# Patient Record
Sex: MALE | Race: WHITE | URBAN - METROPOLITAN AREA
[De-identification: names, ages, dates, MRNs, and addresses within clinical notes are randomized per-mention and may not be internally consistent; named-entity substitution may affect disease eponyms.]

---

## 2017-01-01 ENCOUNTER — APPOINTMENT (OUTPATIENT)
Dept: MRI IMAGING | Facility: CLINIC | Age: 72
DRG: 094 | End: 2017-01-01
Attending: EMERGENCY MEDICINE
Payer: COMMERCIAL

## 2017-01-01 ENCOUNTER — APPOINTMENT (OUTPATIENT)
Dept: GENERAL RADIOLOGY | Facility: CLINIC | Age: 72
DRG: 094 | End: 2017-01-01
Attending: HOSPITALIST
Payer: COMMERCIAL

## 2017-01-01 ENCOUNTER — APPOINTMENT (OUTPATIENT)
Dept: OCCUPATIONAL THERAPY | Facility: CLINIC | Age: 72
DRG: 094 | End: 2017-01-01
Attending: HOSPITALIST
Payer: COMMERCIAL

## 2017-01-01 ENCOUNTER — APPOINTMENT (OUTPATIENT)
Dept: MRI IMAGING | Facility: CLINIC | Age: 72
DRG: 094 | End: 2017-01-01
Attending: NEUROLOGICAL SURGERY
Payer: COMMERCIAL

## 2017-01-01 ENCOUNTER — TRANSFERRED RECORDS (OUTPATIENT)
Dept: HEALTH INFORMATION MANAGEMENT | Facility: CLINIC | Age: 72
End: 2017-01-01

## 2017-01-01 ENCOUNTER — APPOINTMENT (OUTPATIENT)
Dept: INTERVENTIONAL RADIOLOGY/VASCULAR | Facility: CLINIC | Age: 72
DRG: 094 | End: 2017-01-01
Attending: INTERNAL MEDICINE
Payer: COMMERCIAL

## 2017-01-01 ENCOUNTER — APPOINTMENT (OUTPATIENT)
Dept: CT IMAGING | Facility: CLINIC | Age: 72
DRG: 094 | End: 2017-01-01
Attending: PSYCHIATRY & NEUROLOGY
Payer: COMMERCIAL

## 2017-01-01 ENCOUNTER — APPOINTMENT (OUTPATIENT)
Dept: CARDIOLOGY | Facility: CLINIC | Age: 72
DRG: 094 | End: 2017-01-01
Attending: HOSPITALIST
Payer: COMMERCIAL

## 2017-01-01 ENCOUNTER — HOSPITAL ENCOUNTER (INPATIENT)
Facility: CLINIC | Age: 72
LOS: 4 days | Discharge: ANOTHER HOSPITAL | DRG: 094 | End: 2017-01-21
Attending: EMERGENCY MEDICINE | Admitting: HOSPITALIST
Payer: COMMERCIAL

## 2017-01-01 ENCOUNTER — APPOINTMENT (OUTPATIENT)
Dept: SPEECH THERAPY | Facility: CLINIC | Age: 72
DRG: 094 | End: 2017-01-01
Attending: HOSPITALIST
Payer: COMMERCIAL

## 2017-01-01 VITALS
RESPIRATION RATE: 16 BRPM | OXYGEN SATURATION: 96 % | BODY MASS INDEX: 24.3 KG/M2 | WEIGHT: 169.75 LBS | HEART RATE: 127 BPM | SYSTOLIC BLOOD PRESSURE: 122 MMHG | DIASTOLIC BLOOD PRESSURE: 82 MMHG | TEMPERATURE: 98.1 F | HEIGHT: 70 IN

## 2017-01-01 DIAGNOSIS — R29.898 BILATERAL ARM WEAKNESS: ICD-10-CM

## 2017-01-01 DIAGNOSIS — C79.51 BONE METASTASIS: ICD-10-CM

## 2017-01-01 DIAGNOSIS — R29.898 LEG WEAKNESS, BILATERAL: Primary | ICD-10-CM

## 2017-01-01 DIAGNOSIS — R29.898 BILATERAL LEG WEAKNESS: ICD-10-CM

## 2017-01-01 LAB
ALBUMIN SERPL-MCNC: 2.9 G/DL (ref 3.4–5)
ALBUMIN SERPL-MCNC: 3.1 G/DL (ref 3.4–5)
ALBUMIN SERPL-MCNC: 3.2 G/DL (ref 3.4–5)
ALP SERPL-CCNC: 237 U/L (ref 40–150)
ALP SERPL-CCNC: 242 U/L (ref 40–150)
ALP SERPL-CCNC: 243 U/L (ref 40–150)
ALT SERPL W P-5'-P-CCNC: 28 U/L (ref 0–70)
ALT SERPL W P-5'-P-CCNC: 33 U/L (ref 0–70)
ALT SERPL W P-5'-P-CCNC: 45 U/L (ref 0–70)
ANION GAP SERPL CALCULATED.3IONS-SCNC: 10 MMOL/L (ref 3–14)
ANION GAP SERPL CALCULATED.3IONS-SCNC: 5 MMOL/L (ref 3–14)
ANION GAP SERPL CALCULATED.3IONS-SCNC: 8 MMOL/L (ref 3–14)
ANION GAP SERPL CALCULATED.3IONS-SCNC: 9 MMOL/L (ref 3–14)
APPEARANCE CSF: CLEAR
AST SERPL W P-5'-P-CCNC: 51 U/L (ref 0–45)
AST SERPL W P-5'-P-CCNC: 79 U/L (ref 0–45)
AST SERPL W P-5'-P-CCNC: 84 U/L (ref 0–45)
BILIRUB DIRECT SERPL-MCNC: 0.3 MG/DL (ref 0–0.2)
BILIRUB SERPL-MCNC: 0.5 MG/DL (ref 0.2–1.3)
BILIRUB SERPL-MCNC: 0.8 MG/DL (ref 0.2–1.3)
BILIRUB SERPL-MCNC: 0.8 MG/DL (ref 0.2–1.3)
BUN SERPL-MCNC: 16 MG/DL (ref 7–30)
BUN SERPL-MCNC: 21 MG/DL (ref 7–30)
BUN SERPL-MCNC: 23 MG/DL (ref 7–30)
BUN SERPL-MCNC: 25 MG/DL (ref 7–30)
CALCIUM SERPL-MCNC: 8.2 MG/DL (ref 8.5–10.1)
CALCIUM SERPL-MCNC: 8.6 MG/DL (ref 8.5–10.1)
CALCIUM SERPL-MCNC: 9.1 MG/DL (ref 8.5–10.1)
CALCIUM SERPL-MCNC: 9.5 MG/DL (ref 8.5–10.1)
CHLORIDE SERPL-SCNC: 102 MMOL/L (ref 94–109)
CHLORIDE SERPL-SCNC: 103 MMOL/L (ref 94–109)
CHLORIDE SERPL-SCNC: 105 MMOL/L (ref 94–109)
CHLORIDE SERPL-SCNC: 109 MMOL/L (ref 94–109)
CHOLEST SERPL-MCNC: 197 MG/DL
CO2 SERPL-SCNC: 20 MMOL/L (ref 20–32)
CO2 SERPL-SCNC: 25 MMOL/L (ref 20–32)
CO2 SERPL-SCNC: 25 MMOL/L (ref 20–32)
CO2 SERPL-SCNC: 30 MMOL/L (ref 20–32)
COLOR CSF: COLORLESS
COPATH REPORT: NORMAL
CREAT SERPL-MCNC: 1.13 MG/DL (ref 0.66–1.25)
CREAT SERPL-MCNC: 1.15 MG/DL (ref 0.66–1.25)
CREAT SERPL-MCNC: 1.16 MG/DL (ref 0.66–1.25)
CREAT SERPL-MCNC: 1.17 MG/DL (ref 0.66–1.25)
DEPRECATED CALCIDIOL+CALCIFEROL SERPL-MC: 82 UG/L (ref 20–75)
ERYTHROCYTE [DISTWIDTH] IN BLOOD BY AUTOMATED COUNT: 16.5 % (ref 10–15)
ERYTHROCYTE [DISTWIDTH] IN BLOOD BY AUTOMATED COUNT: 16.7 % (ref 10–15)
ERYTHROCYTE [DISTWIDTH] IN BLOOD BY AUTOMATED COUNT: 16.8 % (ref 10–15)
ERYTHROCYTE [DISTWIDTH] IN BLOOD BY AUTOMATED COUNT: 17.1 % (ref 10–15)
FOLATE SERPL-MCNC: 17.7 NG/ML
GFR SERPL CREATININE-BSD FRML MDRD: 62 ML/MIN/1.7M2
GFR SERPL CREATININE-BSD FRML MDRD: 62 ML/MIN/1.7M2
GFR SERPL CREATININE-BSD FRML MDRD: 63 ML/MIN/1.7M2
GFR SERPL CREATININE-BSD FRML MDRD: 64 ML/MIN/1.7M2
GLUCOSE CSF-MCNC: 69 MG/DL (ref 40–70)
GLUCOSE SERPL-MCNC: 104 MG/DL (ref 70–99)
GLUCOSE SERPL-MCNC: 112 MG/DL (ref 70–99)
GLUCOSE SERPL-MCNC: 185 MG/DL (ref 70–99)
GLUCOSE SERPL-MCNC: 99 MG/DL (ref 70–99)
HBA1C MFR BLD: 4.8 % (ref 4.3–6)
HCT VFR BLD AUTO: 30 % (ref 40–53)
HCT VFR BLD AUTO: 30.3 % (ref 40–53)
HCT VFR BLD AUTO: 31.6 % (ref 40–53)
HCT VFR BLD AUTO: 32.9 % (ref 40–53)
HDLC SERPL-MCNC: 54 MG/DL
HGB BLD-MCNC: 10 G/DL (ref 13.3–17.7)
HGB BLD-MCNC: 10 G/DL (ref 13.3–17.7)
HGB BLD-MCNC: 10.7 G/DL (ref 13.3–17.7)
HGB BLD-MCNC: 11 G/DL (ref 13.3–17.7)
INR PPP: 1.11 (ref 0.86–1.14)
INR PPP: 1.15 (ref 0.86–1.14)
INTERPRETATION ECG - MUSE: NORMAL
INTERPRETATION ECG - MUSE: NORMAL
LDLC SERPL CALC-MCNC: 117 MG/DL
MAGNESIUM SERPL-MCNC: 1.1 MG/DL (ref 1.6–2.3)
MAGNESIUM SERPL-MCNC: 1.4 MG/DL (ref 1.6–2.3)
MAGNESIUM SERPL-MCNC: 1.8 MG/DL (ref 1.6–2.3)
MAGNESIUM SERPL-MCNC: 2.2 MG/DL (ref 1.6–2.3)
MAGNESIUM SERPL-MCNC: 2.5 MG/DL (ref 1.6–2.3)
MCH RBC QN AUTO: 34.4 PG (ref 26.5–33)
MCH RBC QN AUTO: 34.8 PG (ref 26.5–33)
MCH RBC QN AUTO: 34.8 PG (ref 26.5–33)
MCH RBC QN AUTO: 35 PG (ref 26.5–33)
MCHC RBC AUTO-ENTMCNC: 33 G/DL (ref 31.5–36.5)
MCHC RBC AUTO-ENTMCNC: 33.3 G/DL (ref 31.5–36.5)
MCHC RBC AUTO-ENTMCNC: 33.4 G/DL (ref 31.5–36.5)
MCHC RBC AUTO-ENTMCNC: 33.9 G/DL (ref 31.5–36.5)
MCV RBC AUTO: 103 FL (ref 78–100)
MCV RBC AUTO: 104 FL (ref 78–100)
MCV RBC AUTO: 104 FL (ref 78–100)
MCV RBC AUTO: 105 FL (ref 78–100)
NONHDLC SERPL-MCNC: 143 MG/DL
PHOSPHATE SERPL-MCNC: 2.8 MG/DL (ref 2.5–4.5)
PLATELET # BLD AUTO: 112 10E9/L (ref 150–450)
PLATELET # BLD AUTO: 135 10E9/L (ref 150–450)
PLATELET # BLD AUTO: 144 10E9/L (ref 150–450)
PLATELET # BLD AUTO: 155 10E9/L (ref 150–450)
PNP ABY SERUM: NORMAL
POTASSIUM SERPL-SCNC: 3.3 MMOL/L (ref 3.4–5.3)
POTASSIUM SERPL-SCNC: 3.9 MMOL/L (ref 3.4–5.3)
POTASSIUM SERPL-SCNC: 4.6 MMOL/L (ref 3.4–5.3)
POTASSIUM SERPL-SCNC: 4.6 MMOL/L (ref 3.4–5.3)
PROT CSF-MCNC: 85 MG/DL (ref 15–60)
PROT SERPL-MCNC: 6.7 G/DL (ref 6.8–8.8)
PROT SERPL-MCNC: 7.2 G/DL (ref 6.8–8.8)
PROT SERPL-MCNC: 7.3 G/DL (ref 6.8–8.8)
RBC # BLD AUTO: 2.87 10E12/L (ref 4.4–5.9)
RBC # BLD AUTO: 2.91 10E12/L (ref 4.4–5.9)
RBC # BLD AUTO: 3.06 10E12/L (ref 4.4–5.9)
RBC # BLD AUTO: 3.16 10E12/L (ref 4.4–5.9)
RBC # CSF MANUAL: 21 /UL (ref 0–2)
SODIUM SERPL-SCNC: 137 MMOL/L (ref 133–144)
SODIUM SERPL-SCNC: 137 MMOL/L (ref 133–144)
SODIUM SERPL-SCNC: 138 MMOL/L (ref 133–144)
SODIUM SERPL-SCNC: 139 MMOL/L (ref 133–144)
TRIGL SERPL-MCNC: 131 MG/DL
TROPONIN I SERPL-MCNC: 0.07 UG/L (ref 0–0.04)
TSH SERPL DL<=0.005 MIU/L-ACNC: 0.81 MU/L (ref 0.4–4)
TUBE # CSF: 4 #
VIT B12 SERPL-MCNC: >6000 PG/ML (ref 193–986)
WBC # BLD AUTO: 3.7 10E9/L (ref 4–11)
WBC # BLD AUTO: 4.3 10E9/L (ref 4–11)
WBC # BLD AUTO: 5.6 10E9/L (ref 4–11)
WBC # BLD AUTO: 5.9 10E9/L (ref 4–11)
WBC # CSF MANUAL: 0 /UL (ref 0–5)

## 2017-01-01 PROCEDURE — 25000132 ZZH RX MED GY IP 250 OP 250 PS 637: Performed by: HOSPITALIST

## 2017-01-01 PROCEDURE — 40000885 ZZH STATISTIC STEP DOWN HRS EVENING

## 2017-01-01 PROCEDURE — 99239 HOSP IP/OBS DSCHRG MGMT >30: CPT | Performed by: INTERNAL MEDICINE

## 2017-01-01 PROCEDURE — 80061 LIPID PANEL: CPT | Performed by: HOSPITALIST

## 2017-01-01 PROCEDURE — 40000275 ZZH STATISTIC RCP TIME EA 10 MIN

## 2017-01-01 PROCEDURE — 99223 1ST HOSP IP/OBS HIGH 75: CPT | Mod: AI | Performed by: HOSPITALIST

## 2017-01-01 PROCEDURE — 25000128 H RX IP 250 OP 636: Performed by: INTERNAL MEDICINE

## 2017-01-01 PROCEDURE — 84157 ASSAY OF PROTEIN OTHER: CPT | Performed by: PSYCHIATRY & NEUROLOGY

## 2017-01-01 PROCEDURE — 00000102 ZZHCL STATISTIC CYTO WRIGHT STAIN TC: Performed by: PSYCHIATRY & NEUROLOGY

## 2017-01-01 PROCEDURE — 80053 COMPREHEN METABOLIC PANEL: CPT | Performed by: HOSPITALIST

## 2017-01-01 PROCEDURE — 36415 COLL VENOUS BLD VENIPUNCTURE: CPT | Performed by: INTERNAL MEDICINE

## 2017-01-01 PROCEDURE — 96365 THER/PROPH/DIAG IV INF INIT: CPT

## 2017-01-01 PROCEDURE — 27210886 ZZH ACCESSORY CR5

## 2017-01-01 PROCEDURE — 99233 SBSQ HOSP IP/OBS HIGH 50: CPT | Performed by: HOSPITALIST

## 2017-01-01 PROCEDURE — A9585 GADOBUTROL INJECTION: HCPCS | Performed by: HOSPITALIST

## 2017-01-01 PROCEDURE — 12000000 ZZH R&B MED SURG/OB

## 2017-01-01 PROCEDURE — 40000884 ZZH STATISTIC STEP DOWN HRS NIGHT

## 2017-01-01 PROCEDURE — 94150 VITAL CAPACITY TEST: CPT

## 2017-01-01 PROCEDURE — 85610 PROTHROMBIN TIME: CPT | Performed by: EMERGENCY MEDICINE

## 2017-01-01 PROCEDURE — C1769 GUIDE WIRE: HCPCS

## 2017-01-01 PROCEDURE — 83735 ASSAY OF MAGNESIUM: CPT | Performed by: INTERNAL MEDICINE

## 2017-01-01 PROCEDURE — 99232 SBSQ HOSP IP/OBS MODERATE 35: CPT | Performed by: HOSPITALIST

## 2017-01-01 PROCEDURE — 82607 VITAMIN B-12: CPT | Performed by: PSYCHIATRY & NEUROLOGY

## 2017-01-01 PROCEDURE — 25500064 ZZH RX 255 OP 636: Performed by: HOSPITALIST

## 2017-01-01 PROCEDURE — 25000125 ZZHC RX 250: Performed by: INTERNAL MEDICINE

## 2017-01-01 PROCEDURE — 92610 EVALUATE SWALLOWING FUNCTION: CPT | Mod: GN | Performed by: SPEECH-LANGUAGE PATHOLOGIST

## 2017-01-01 PROCEDURE — 80048 BASIC METABOLIC PNL TOTAL CA: CPT | Performed by: HOSPITALIST

## 2017-01-01 PROCEDURE — 25000132 ZZH RX MED GY IP 250 OP 250 PS 637: Performed by: INTERNAL MEDICINE

## 2017-01-01 PROCEDURE — 85027 COMPLETE CBC AUTOMATED: CPT | Performed by: HOSPITALIST

## 2017-01-01 PROCEDURE — 85027 COMPLETE CBC AUTOMATED: CPT | Performed by: EMERGENCY MEDICINE

## 2017-01-01 PROCEDURE — 84443 ASSAY THYROID STIM HORMONE: CPT | Performed by: HOSPITALIST

## 2017-01-01 PROCEDURE — 77003 FLUOROGUIDE FOR SPINE INJECT: CPT

## 2017-01-01 PROCEDURE — 89050 BODY FLUID CELL COUNT: CPT | Performed by: PSYCHIATRY & NEUROLOGY

## 2017-01-01 PROCEDURE — 36558 INSERT TUNNELED CV CATH: CPT | Mod: LT

## 2017-01-01 PROCEDURE — 40000264 ECHO BUBBLE STUDY W/LUMASON

## 2017-01-01 PROCEDURE — 25000125 ZZHC RX 250: Performed by: EMERGENCY MEDICINE

## 2017-01-01 PROCEDURE — 83520 IMMUNOASSAY QUANT NOS NONAB: CPT | Performed by: PSYCHIATRY & NEUROLOGY

## 2017-01-01 PROCEDURE — 95885 MUSC TST DONE W/NERV TST LIM: CPT

## 2017-01-01 PROCEDURE — 25000125 ZZHC RX 250: Performed by: NEUROLOGICAL SURGERY

## 2017-01-01 PROCEDURE — 36415 COLL VENOUS BLD VENIPUNCTURE: CPT | Performed by: PSYCHIATRY & NEUROLOGY

## 2017-01-01 PROCEDURE — 25500064 ZZH RX 255 OP 636: Performed by: PSYCHIATRY & NEUROLOGY

## 2017-01-01 PROCEDURE — 88108 CYTOPATH CONCENTRATE TECH: CPT | Performed by: PSYCHIATRY & NEUROLOGY

## 2017-01-01 PROCEDURE — 25000125 ZZHC RX 250: Performed by: HOSPITALIST

## 2017-01-01 PROCEDURE — 82746 ASSAY OF FOLIC ACID SERUM: CPT | Performed by: PSYCHIATRY & NEUROLOGY

## 2017-01-01 PROCEDURE — 97530 THERAPEUTIC ACTIVITIES: CPT | Mod: GO

## 2017-01-01 PROCEDURE — 82306 VITAMIN D 25 HYDROXY: CPT | Performed by: HOSPITALIST

## 2017-01-01 PROCEDURE — 83519 RIA NONANTIBODY: CPT | Performed by: PSYCHIATRY & NEUROLOGY

## 2017-01-01 PROCEDURE — 72141 MRI NECK SPINE W/O DYE: CPT

## 2017-01-01 PROCEDURE — 99285 EMERGENCY DEPT VISIT HI MDM: CPT | Mod: 25

## 2017-01-01 PROCEDURE — C1750 CATH, HEMODIALYSIS,LONG-TERM: HCPCS

## 2017-01-01 PROCEDURE — 70551 MRI BRAIN STEM W/O DYE: CPT

## 2017-01-01 PROCEDURE — 82945 GLUCOSE OTHER FLUID: CPT | Performed by: PSYCHIATRY & NEUROLOGY

## 2017-01-01 PROCEDURE — 83036 HEMOGLOBIN GLYCOSYLATED A1C: CPT | Performed by: HOSPITALIST

## 2017-01-01 PROCEDURE — 83735 ASSAY OF MAGNESIUM: CPT | Performed by: HOSPITALIST

## 2017-01-01 PROCEDURE — 36415 COLL VENOUS BLD VENIPUNCTURE: CPT | Performed by: HOSPITALIST

## 2017-01-01 PROCEDURE — 40000133 ZZH STATISTIC OT WARD VISIT

## 2017-01-01 PROCEDURE — 97167 OT EVAL HIGH COMPLEX 60 MIN: CPT | Mod: GO

## 2017-01-01 PROCEDURE — 27210905 ZZH KIT CR7

## 2017-01-01 PROCEDURE — 93005 ELECTROCARDIOGRAM TRACING: CPT

## 2017-01-01 PROCEDURE — 25500045 ZZH RX 255: Performed by: HOSPITALIST

## 2017-01-01 PROCEDURE — 25000125 ZZHC RX 250: Performed by: PSYCHIATRY & NEUROLOGY

## 2017-01-01 PROCEDURE — 86255 FLUORESCENT ANTIBODY SCREEN: CPT | Performed by: PSYCHIATRY & NEUROLOGY

## 2017-01-01 PROCEDURE — 27211193 ZZ H WOUND GLUE CR1

## 2017-01-01 PROCEDURE — 99232 SBSQ HOSP IP/OBS MODERATE 35: CPT | Performed by: INTERNAL MEDICINE

## 2017-01-01 PROCEDURE — P9041 ALBUMIN (HUMAN),5%, 50ML: HCPCS | Performed by: INTERNAL MEDICINE

## 2017-01-01 PROCEDURE — A9270 NON-COVERED ITEM OR SERVICE: HCPCS | Mod: GY | Performed by: EMERGENCY MEDICINE

## 2017-01-01 PROCEDURE — 40000225 ZZH STATISTIC SLP WARD VISIT: Performed by: SPEECH-LANGUAGE PATHOLOGIST

## 2017-01-01 PROCEDURE — 95886 MUSC TEST DONE W/N TEST COMP: CPT

## 2017-01-01 PROCEDURE — 99232 SBSQ HOSP IP/OBS MODERATE 35: CPT | Performed by: NURSE PRACTITIONER

## 2017-01-01 PROCEDURE — 72142 MRI NECK SPINE W/DYE: CPT

## 2017-01-01 PROCEDURE — 12000007 ZZH R&B INTERMEDIATE

## 2017-01-01 PROCEDURE — 80048 BASIC METABOLIC PNL TOTAL CA: CPT | Performed by: EMERGENCY MEDICINE

## 2017-01-01 PROCEDURE — 95910 NRV CNDJ TEST 7-8 STUDIES: CPT

## 2017-01-01 PROCEDURE — 84100 ASSAY OF PHOSPHORUS: CPT | Performed by: HOSPITALIST

## 2017-01-01 PROCEDURE — 25000125 ZZHC RX 250

## 2017-01-01 PROCEDURE — 72146 MRI CHEST SPINE W/O DYE: CPT

## 2017-01-01 PROCEDURE — 82746 ASSAY OF FOLIC ACID SERUM: CPT | Performed by: HOSPITALIST

## 2017-01-01 PROCEDURE — 36514 APHERESIS PLASMA: CPT

## 2017-01-01 PROCEDURE — 25000132 ZZH RX MED GY IP 250 OP 250 PS 637: Mod: GY | Performed by: EMERGENCY MEDICINE

## 2017-01-01 PROCEDURE — 85610 PROTHROMBIN TIME: CPT | Performed by: HOSPITALIST

## 2017-01-01 PROCEDURE — 80076 HEPATIC FUNCTION PANEL: CPT | Performed by: EMERGENCY MEDICINE

## 2017-01-01 PROCEDURE — 93306 TTE W/DOPPLER COMPLETE: CPT | Mod: 26 | Performed by: INTERNAL MEDICINE

## 2017-01-01 PROCEDURE — 88108 CYTOPATH CONCENTRATE TECH: CPT | Mod: 26 | Performed by: PSYCHIATRY & NEUROLOGY

## 2017-01-01 PROCEDURE — 93010 ELECTROCARDIOGRAM REPORT: CPT | Performed by: INTERNAL MEDICINE

## 2017-01-01 PROCEDURE — 25000128 H RX IP 250 OP 636: Performed by: HOSPITALIST

## 2017-01-01 PROCEDURE — 83735 ASSAY OF MAGNESIUM: CPT | Performed by: EMERGENCY MEDICINE

## 2017-01-01 PROCEDURE — 99221 1ST HOSP IP/OBS SF/LOW 40: CPT | Performed by: NEUROLOGICAL SURGERY

## 2017-01-01 PROCEDURE — 72148 MRI LUMBAR SPINE W/O DYE: CPT

## 2017-01-01 PROCEDURE — 96375 TX/PRO/DX INJ NEW DRUG ADDON: CPT

## 2017-01-01 PROCEDURE — 25000125 ZZHC RX 250: Performed by: RADIOLOGY

## 2017-01-01 PROCEDURE — 70498 CT ANGIOGRAPHY NECK: CPT

## 2017-01-01 PROCEDURE — 84484 ASSAY OF TROPONIN QUANT: CPT | Performed by: HOSPITALIST

## 2017-01-01 RX ORDER — HEPARIN SODIUM 1000 [USP'U]/ML
2-6 INJECTION, SOLUTION INTRAVENOUS; SUBCUTANEOUS ONCE
Status: COMPLETED | OUTPATIENT
Start: 2017-01-01 | End: 2017-01-01

## 2017-01-01 RX ORDER — HEPARIN SODIUM 1000 [USP'U]/ML
1-6 INJECTION, SOLUTION INTRAVENOUS; SUBCUTANEOUS
Status: COMPLETED | OUTPATIENT
Start: 2017-01-01 | End: 2017-01-01

## 2017-01-01 RX ORDER — HYDROMORPHONE HYDROCHLORIDE 1 MG/ML
.3-.5 INJECTION, SOLUTION INTRAMUSCULAR; INTRAVENOUS; SUBCUTANEOUS
Status: DISCONTINUED | OUTPATIENT
Start: 2017-01-01 | End: 2017-01-01 | Stop reason: HOSPADM

## 2017-01-01 RX ORDER — MAGNESIUM SULFATE HEPTAHYDRATE 40 MG/ML
4 INJECTION, SOLUTION INTRAVENOUS EVERY 4 HOURS PRN
Status: DISCONTINUED | OUTPATIENT
Start: 2017-01-01 | End: 2017-01-01 | Stop reason: HOSPADM

## 2017-01-01 RX ORDER — LIDOCAINE 40 MG/G
CREAM TOPICAL
Status: DISCONTINUED | OUTPATIENT
Start: 2017-01-01 | End: 2017-01-01 | Stop reason: HOSPADM

## 2017-01-01 RX ORDER — ALBUMIN HUMAN 25 %
5000 INTRAVENOUS SOLUTION INTRAVENOUS ONCE
Status: COMPLETED | OUTPATIENT
Start: 2017-01-01 | End: 2017-01-01

## 2017-01-01 RX ORDER — DEXAMETHASONE SODIUM PHOSPHATE 4 MG/ML
6 INJECTION, SOLUTION INTRA-ARTICULAR; INTRALESIONAL; INTRAMUSCULAR; INTRAVENOUS; SOFT TISSUE EVERY 6 HOURS
Status: DISCONTINUED | OUTPATIENT
Start: 2017-01-01 | End: 2017-01-01

## 2017-01-01 RX ORDER — ONDANSETRON 2 MG/ML
4 INJECTION INTRAMUSCULAR; INTRAVENOUS ONCE
Status: COMPLETED | OUTPATIENT
Start: 2017-01-01 | End: 2017-01-01

## 2017-01-01 RX ORDER — LABETALOL HYDROCHLORIDE 5 MG/ML
10-40 INJECTION, SOLUTION INTRAVENOUS EVERY 10 MIN PRN
Status: DISCONTINUED | OUTPATIENT
Start: 2017-01-01 | End: 2017-01-01 | Stop reason: HOSPADM

## 2017-01-01 RX ORDER — SENNOSIDES 8.6 MG
1 TABLET ORAL 2 TIMES DAILY
COMMUNITY

## 2017-01-01 RX ORDER — ONDANSETRON 2 MG/ML
4 INJECTION INTRAMUSCULAR; INTRAVENOUS ONCE
Status: DISCONTINUED | OUTPATIENT
Start: 2017-01-01 | End: 2017-01-01 | Stop reason: HOSPADM

## 2017-01-01 RX ORDER — ONDANSETRON 2 MG/ML
4 INJECTION INTRAMUSCULAR; INTRAVENOUS EVERY 6 HOURS PRN
Status: DISCONTINUED | OUTPATIENT
Start: 2017-01-01 | End: 2017-01-01 | Stop reason: HOSPADM

## 2017-01-01 RX ORDER — POTASSIUM CHLORIDE 1500 MG/1
20 TABLET, EXTENDED RELEASE ORAL ONCE
Status: COMPLETED | OUTPATIENT
Start: 2017-01-01 | End: 2017-01-01

## 2017-01-01 RX ORDER — NALOXONE HYDROCHLORIDE 0.4 MG/ML
.1-.4 INJECTION, SOLUTION INTRAMUSCULAR; INTRAVENOUS; SUBCUTANEOUS
Status: DISCONTINUED | OUTPATIENT
Start: 2017-01-01 | End: 2017-01-01 | Stop reason: HOSPADM

## 2017-01-01 RX ORDER — MIRTAZAPINE 15 MG/1
15 TABLET, FILM COATED ORAL AT BEDTIME
Status: DISCONTINUED | OUTPATIENT
Start: 2017-01-01 | End: 2017-01-01 | Stop reason: HOSPADM

## 2017-01-01 RX ORDER — POTASSIUM CHLORIDE 1500 MG/1
20-40 TABLET, EXTENDED RELEASE ORAL
Status: DISCONTINUED | OUTPATIENT
Start: 2017-01-01 | End: 2017-01-01 | Stop reason: HOSPADM

## 2017-01-01 RX ORDER — HEPARIN SODIUM 1000 [USP'U]/ML
INJECTION, SOLUTION INTRAVENOUS; SUBCUTANEOUS
Status: DISCONTINUED
Start: 2017-01-01 | End: 2017-01-01 | Stop reason: HOSPADM

## 2017-01-01 RX ORDER — LABETALOL HYDROCHLORIDE 5 MG/ML
10 INJECTION, SOLUTION INTRAVENOUS
Status: DISCONTINUED | OUTPATIENT
Start: 2017-01-01 | End: 2017-01-01 | Stop reason: HOSPADM

## 2017-01-01 RX ORDER — LIDOCAINE HYDROCHLORIDE 10 MG/ML
1-30 INJECTION, SOLUTION EPIDURAL; INFILTRATION; INTRACAUDAL; PERINEURAL
Status: COMPLETED | OUTPATIENT
Start: 2017-01-01 | End: 2017-01-01

## 2017-01-01 RX ORDER — MULTIVITAMIN,THERAPEUTIC
1 TABLET ORAL DAILY
Status: DISCONTINUED | OUTPATIENT
Start: 2017-01-01 | End: 2017-01-01 | Stop reason: HOSPADM

## 2017-01-01 RX ORDER — HYDRALAZINE HYDROCHLORIDE 20 MG/ML
10 INJECTION INTRAMUSCULAR; INTRAVENOUS EVERY 4 HOURS PRN
Status: DISCONTINUED | OUTPATIENT
Start: 2017-01-01 | End: 2017-01-01 | Stop reason: HOSPADM

## 2017-01-01 RX ORDER — FENTANYL CITRATE 50 UG/ML
INJECTION, SOLUTION INTRAMUSCULAR; INTRAVENOUS
Status: DISCONTINUED
Start: 2017-01-01 | End: 2017-01-01 | Stop reason: HOSPADM

## 2017-01-01 RX ORDER — DEXAMETHASONE SODIUM PHOSPHATE 10 MG/ML
10 INJECTION, SOLUTION INTRAMUSCULAR; INTRAVENOUS ONCE
Status: COMPLETED | OUTPATIENT
Start: 2017-01-01 | End: 2017-01-01

## 2017-01-01 RX ORDER — GADOBUTROL 604.72 MG/ML
7 INJECTION INTRAVENOUS ONCE
Status: COMPLETED | OUTPATIENT
Start: 2017-01-01 | End: 2017-01-01

## 2017-01-01 RX ORDER — POTASSIUM CHLORIDE 29.8 MG/ML
20 INJECTION INTRAVENOUS
Status: DISCONTINUED | OUTPATIENT
Start: 2017-01-01 | End: 2017-01-01 | Stop reason: HOSPADM

## 2017-01-01 RX ORDER — HEPARIN SODIUM 1000 [USP'U]/ML
2-6 INJECTION, SOLUTION INTRAVENOUS; SUBCUTANEOUS ONCE
Status: DISCONTINUED | OUTPATIENT
Start: 2017-01-01 | End: 2017-01-01

## 2017-01-01 RX ORDER — MULTIVITAMIN,THERAPEUTIC
1 TABLET ORAL DAILY
COMMUNITY

## 2017-01-01 RX ORDER — HEPARIN SODIUM 1000 [USP'U]/ML
1-5 INJECTION, SOLUTION INTRAVENOUS; SUBCUTANEOUS
Status: DISCONTINUED | OUTPATIENT
Start: 2017-01-01 | End: 2017-01-01 | Stop reason: HOSPADM

## 2017-01-01 RX ORDER — POLYETHYLENE GLYCOL 3350 17 G/17G
17 POWDER, FOR SOLUTION ORAL DAILY PRN
Status: DISCONTINUED | OUTPATIENT
Start: 2017-01-01 | End: 2017-01-01 | Stop reason: HOSPADM

## 2017-01-01 RX ORDER — LIDOCAINE 50 MG/G
1-2 PATCH TOPICAL
Status: DISCONTINUED | OUTPATIENT
Start: 2017-01-01 | End: 2017-01-01 | Stop reason: HOSPADM

## 2017-01-01 RX ORDER — SENNOSIDES 8.6 MG
1 TABLET ORAL 2 TIMES DAILY
Status: DISCONTINUED | OUTPATIENT
Start: 2017-01-01 | End: 2017-01-01 | Stop reason: HOSPADM

## 2017-01-01 RX ORDER — FENTANYL CITRATE 50 UG/ML
25-50 INJECTION, SOLUTION INTRAMUSCULAR; INTRAVENOUS EVERY 5 MIN PRN
Status: DISCONTINUED | OUTPATIENT
Start: 2017-01-01 | End: 2017-01-01 | Stop reason: HOSPADM

## 2017-01-01 RX ORDER — HEPARIN SODIUM 1000 [USP'U]/ML
6000 INJECTION, SOLUTION INTRAVENOUS; SUBCUTANEOUS ONCE
Status: COMPLETED | OUTPATIENT
Start: 2017-01-01 | End: 2017-01-01

## 2017-01-01 RX ORDER — ATENOLOL 25 MG/1
25 TABLET ORAL DAILY
Status: DISCONTINUED | OUTPATIENT
Start: 2017-01-01 | End: 2017-01-01 | Stop reason: HOSPADM

## 2017-01-01 RX ORDER — HEPARIN SODIUM (PORCINE) LOCK FLUSH IV SOLN 100 UNIT/ML 100 UNIT/ML
3 SOLUTION INTRAVENOUS
Status: DISCONTINUED | OUTPATIENT
Start: 2017-01-01 | End: 2017-01-01 | Stop reason: HOSPADM

## 2017-01-01 RX ORDER — MAGNESIUM OXIDE 400 MG/1
400 TABLET ORAL DAILY
Status: DISCONTINUED | OUTPATIENT
Start: 2017-01-01 | End: 2017-01-01 | Stop reason: HOSPADM

## 2017-01-01 RX ORDER — IOPAMIDOL 755 MG/ML
70 INJECTION, SOLUTION INTRAVASCULAR ONCE
Status: COMPLETED | OUTPATIENT
Start: 2017-01-01 | End: 2017-01-01

## 2017-01-01 RX ORDER — BISACODYL 10 MG
10 SUPPOSITORY, RECTAL RECTAL DAILY PRN
Status: DISCONTINUED | OUTPATIENT
Start: 2017-01-01 | End: 2017-01-01 | Stop reason: HOSPADM

## 2017-01-01 RX ORDER — FLUMAZENIL 0.1 MG/ML
0.2 INJECTION, SOLUTION INTRAVENOUS
Status: DISCONTINUED | OUTPATIENT
Start: 2017-01-01 | End: 2017-01-01 | Stop reason: HOSPADM

## 2017-01-01 RX ORDER — HYDROCODONE BITARTRATE AND ACETAMINOPHEN 5; 325 MG/1; MG/1
1-2 TABLET ORAL EVERY 4 HOURS PRN
Status: DISCONTINUED | OUTPATIENT
Start: 2017-01-01 | End: 2017-01-01 | Stop reason: HOSPADM

## 2017-01-01 RX ORDER — LISINOPRIL 10 MG/1
10 TABLET ORAL DAILY
Status: DISCONTINUED | OUTPATIENT
Start: 2017-01-01 | End: 2017-01-01

## 2017-01-01 RX ORDER — HEPARIN SODIUM (PORCINE) LOCK FLUSH IV SOLN 100 UNIT/ML 100 UNIT/ML
3 SOLUTION INTRAVENOUS EVERY 24 HOURS
Status: DISCONTINUED | OUTPATIENT
Start: 2017-01-01 | End: 2017-01-01

## 2017-01-01 RX ORDER — ONDANSETRON 4 MG/1
4 TABLET, ORALLY DISINTEGRATING ORAL EVERY 6 HOURS PRN
Status: DISCONTINUED | OUTPATIENT
Start: 2017-01-01 | End: 2017-01-01

## 2017-01-01 RX ORDER — ALBUMIN HUMAN 25 %
3500 INTRAVENOUS SOLUTION INTRAVENOUS ONCE
Status: DISCONTINUED | OUTPATIENT
Start: 2017-01-01 | End: 2017-01-01 | Stop reason: HOSPADM

## 2017-01-01 RX ORDER — POTASSIUM CHLORIDE 7.45 MG/ML
10 INJECTION INTRAVENOUS
Status: DISCONTINUED | OUTPATIENT
Start: 2017-01-01 | End: 2017-01-01 | Stop reason: HOSPADM

## 2017-01-01 RX ORDER — LIDOCAINE 40 MG/G
CREAM TOPICAL
Status: DISCONTINUED | OUTPATIENT
Start: 2017-01-01 | End: 2017-01-01

## 2017-01-01 RX ORDER — POTASSIUM CHLORIDE 1.5 G/1.58G
20-40 POWDER, FOR SOLUTION ORAL
Status: DISCONTINUED | OUTPATIENT
Start: 2017-01-01 | End: 2017-01-01 | Stop reason: HOSPADM

## 2017-01-01 RX ORDER — AMLODIPINE BESYLATE 5 MG/1
5 TABLET ORAL DAILY
Status: DISCONTINUED | OUTPATIENT
Start: 2017-01-01 | End: 2017-01-01

## 2017-01-01 RX ORDER — MORPHINE SULFATE 2 MG/ML
2 INJECTION, SOLUTION INTRAMUSCULAR; INTRAVENOUS ONCE
Status: COMPLETED | OUTPATIENT
Start: 2017-01-01 | End: 2017-01-01

## 2017-01-01 RX ORDER — DOCUSATE SODIUM 100 MG/1
100 CAPSULE, LIQUID FILLED ORAL DAILY
Status: DISCONTINUED | OUTPATIENT
Start: 2017-01-01 | End: 2017-01-01 | Stop reason: HOSPADM

## 2017-01-01 RX ORDER — ONDANSETRON 4 MG/1
4 TABLET, ORALLY DISINTEGRATING ORAL EVERY 6 HOURS PRN
Status: DISCONTINUED | OUTPATIENT
Start: 2017-01-01 | End: 2017-01-01 | Stop reason: HOSPADM

## 2017-01-01 RX ORDER — ONDANSETRON 2 MG/ML
4 INJECTION INTRAMUSCULAR; INTRAVENOUS EVERY 6 HOURS PRN
Status: DISCONTINUED | OUTPATIENT
Start: 2017-01-01 | End: 2017-01-01

## 2017-01-01 RX ORDER — ATENOLOL 25 MG/1
25 TABLET ORAL DAILY
Status: DISCONTINUED | OUTPATIENT
Start: 2017-01-01 | End: 2017-01-01

## 2017-01-01 RX ORDER — ACETAMINOPHEN 325 MG/1
650 TABLET ORAL EVERY 4 HOURS PRN
Status: DISCONTINUED | OUTPATIENT
Start: 2017-01-01 | End: 2017-01-01 | Stop reason: HOSPADM

## 2017-01-01 RX ORDER — ACETAMINOPHEN 650 MG/1
650 SUPPOSITORY RECTAL EVERY 4 HOURS PRN
Status: DISCONTINUED | OUTPATIENT
Start: 2017-01-01 | End: 2017-01-01 | Stop reason: HOSPADM

## 2017-01-01 RX ORDER — AMOXICILLIN 250 MG
1-2 CAPSULE ORAL 2 TIMES DAILY PRN
Status: DISCONTINUED | OUTPATIENT
Start: 2017-01-01 | End: 2017-01-01 | Stop reason: HOSPADM

## 2017-01-01 RX ADMIN — SULFUR HEXAFLUORIDE 2 ML: KIT at 15:29

## 2017-01-01 RX ADMIN — FENTANYL CITRATE 25 MCG: 50 INJECTION, SOLUTION INTRAMUSCULAR; INTRAVENOUS at 08:02

## 2017-01-01 RX ADMIN — THERA TABS 1 TABLET: TAB at 13:13

## 2017-01-01 RX ADMIN — THERA TABS 1 TABLET: TAB at 09:06

## 2017-01-01 RX ADMIN — MIRTAZAPINE 15 MG: 15 TABLET, FILM COATED ORAL at 21:12

## 2017-01-01 RX ADMIN — SODIUM CHLORIDE 100 ML: 9 INJECTION, SOLUTION INTRAVENOUS at 17:20

## 2017-01-01 RX ADMIN — ONDANSETRON HYDROCHLORIDE 4 MG: 2 SOLUTION INTRAMUSCULAR; INTRAVENOUS at 12:20

## 2017-01-01 RX ADMIN — HYDROCODONE BITARTRATE AND ACETAMINOPHEN 2 TABLET: 5; 325 TABLET ORAL at 09:32

## 2017-01-01 RX ADMIN — MAGNESIUM SULFATE IN WATER 4 G: 40 INJECTION, SOLUTION INTRAVENOUS at 23:47

## 2017-01-01 RX ADMIN — Medication 2 G: at 15:27

## 2017-01-01 RX ADMIN — LIDOCAINE HYDROCHLORIDE 18 MG: 10 INJECTION, SOLUTION EPIDURAL; INFILTRATION; INTRACAUDAL; PERINEURAL at 08:24

## 2017-01-01 RX ADMIN — ENOXAPARIN SODIUM 40 MG: 40 INJECTION SUBCUTANEOUS at 14:35

## 2017-01-01 RX ADMIN — MAGNESIUM OXIDE TAB 400 MG (241.3 MG ELEMENTAL MG) 400 MG: 400 (241.3 MG) TAB at 08:05

## 2017-01-01 RX ADMIN — HYDROCODONE BITARTRATE AND ACETAMINOPHEN 2 TABLET: 5; 325 TABLET ORAL at 22:37

## 2017-01-01 RX ADMIN — ANTICOAGULANT CITRATE DEXTROSE SOLUTION FORMULA A 1000 ML: 12.25; 11; 3.65 SOLUTION INTRAVENOUS at 10:05

## 2017-01-01 RX ADMIN — ACETAMINOPHEN 650 MG: 325 TABLET, FILM COATED ORAL at 22:51

## 2017-01-01 RX ADMIN — SENNOSIDES 1 TABLET: 8.6 TABLET, FILM COATED ORAL at 09:05

## 2017-01-01 RX ADMIN — MAGNESIUM SULFATE IN WATER 4 G: 40 INJECTION, SOLUTION INTRAVENOUS at 15:01

## 2017-01-01 RX ADMIN — SENNOSIDES 1 TABLET: 8.6 TABLET, FILM COATED ORAL at 08:05

## 2017-01-01 RX ADMIN — MIDAZOLAM 1 MG: 1 INJECTION INTRAMUSCULAR; INTRAVENOUS at 08:01

## 2017-01-01 RX ADMIN — ENOXAPARIN SODIUM 40 MG: 40 INJECTION SUBCUTANEOUS at 14:24

## 2017-01-01 RX ADMIN — ONDANSETRON HYDROCHLORIDE 4 MG: 2 SOLUTION INTRAMUSCULAR; INTRAVENOUS at 15:55

## 2017-01-01 RX ADMIN — POTASSIUM CHLORIDE 40 MEQ: 1500 TABLET, EXTENDED RELEASE ORAL at 23:26

## 2017-01-01 RX ADMIN — DOCUSATE SODIUM 100 MG: 100 CAPSULE, LIQUID FILLED ORAL at 09:05

## 2017-01-01 RX ADMIN — METOPROLOL TARTRATE 2.5 MG: 5 INJECTION INTRAVENOUS at 07:05

## 2017-01-01 RX ADMIN — ALBUMIN (HUMAN) 5000 ML: 12.5 SOLUTION INTRAVENOUS at 11:15

## 2017-01-01 RX ADMIN — IOPAMIDOL 70 ML: 755 INJECTION, SOLUTION INTRAVENOUS at 17:20

## 2017-01-01 RX ADMIN — METOPROLOL TARTRATE 2.5 MG: 5 INJECTION INTRAVENOUS at 02:20

## 2017-01-01 RX ADMIN — CHOLECALCIFEROL TAB 10 MCG (400 UNIT) 400 UNITS: 10 TAB at 11:08

## 2017-01-01 RX ADMIN — DEXAMETHASONE SODIUM PHOSPHATE 10 MG: 10 INJECTION, SOLUTION INTRAMUSCULAR; INTRAVENOUS at 16:13

## 2017-01-01 RX ADMIN — POTASSIUM CHLORIDE 20 MEQ: 1500 TABLET, EXTENDED RELEASE ORAL at 15:27

## 2017-01-01 RX ADMIN — STANDARDIZED SENNA CONCENTRATE AND DOCUSATE SODIUM 1 TABLET: 8.6; 5 TABLET, FILM COATED ORAL at 11:09

## 2017-01-01 RX ADMIN — CALCIUM GLUCONATE 6 G: 94 INJECTION, SOLUTION INTRAVENOUS at 11:15

## 2017-01-01 RX ADMIN — CHOLECALCIFEROL TAB 10 MCG (400 UNIT) 400 UNITS: 10 TAB at 09:05

## 2017-01-01 RX ADMIN — HEPARIN SODIUM 6000 UNITS: 1000 INJECTION, SOLUTION INTRAVENOUS; SUBCUTANEOUS at 08:23

## 2017-01-01 RX ADMIN — HYDROCODONE BITARTRATE AND ACETAMINOPHEN 2 TABLET: 5; 325 TABLET ORAL at 07:11

## 2017-01-01 RX ADMIN — MORPHINE SULFATE 2 MG: 2 INJECTION, SOLUTION INTRAMUSCULAR; INTRAVENOUS at 15:57

## 2017-01-01 RX ADMIN — SENNOSIDES 1 TABLET: 8.6 TABLET, FILM COATED ORAL at 21:07

## 2017-01-01 RX ADMIN — CHOLECALCIFEROL TAB 10 MCG (400 UNIT) 400 UNITS: 10 TAB at 08:05

## 2017-01-01 RX ADMIN — METOPROLOL TARTRATE 2.5 MG: 5 INJECTION INTRAVENOUS at 20:13

## 2017-01-01 RX ADMIN — DOCUSATE SODIUM 100 MG: 100 CAPSULE, LIQUID FILLED ORAL at 08:05

## 2017-01-01 RX ADMIN — HEPARIN SODIUM 3200 UNITS: 1000 INJECTION, SOLUTION INTRAVENOUS; SUBCUTANEOUS at 13:43

## 2017-01-01 RX ADMIN — THERA TABS 1 TABLET: TAB at 11:09

## 2017-01-01 RX ADMIN — HYDROCODONE BITARTRATE AND ACETAMINOPHEN 2 TABLET: 5; 325 TABLET ORAL at 18:36

## 2017-01-01 RX ADMIN — HYDROCODONE BITARTRATE AND ACETAMINOPHEN 2 TABLET: 5; 325 TABLET ORAL at 14:24

## 2017-01-01 RX ADMIN — CALCIUM GLUCONATE 5 G: 94 INJECTION, SOLUTION INTRAVENOUS at 10:05

## 2017-01-01 RX ADMIN — ALBUMIN (HUMAN) 5000 ML: 12.5 SOLUTION INTRAVENOUS at 10:05

## 2017-01-01 RX ADMIN — DEXAMETHASONE SODIUM PHOSPHATE 6 MG: 4 INJECTION, SOLUTION INTRA-ARTICULAR; INTRALESIONAL; INTRAMUSCULAR; INTRAVENOUS; SOFT TISSUE at 23:27

## 2017-01-01 RX ADMIN — MAGNESIUM OXIDE TAB 400 MG (241.3 MG ELEMENTAL MG) 400 MG: 400 (241.3 MG) TAB at 14:35

## 2017-01-01 RX ADMIN — LIDOCAINE 1 PATCH: 50 PATCH TOPICAL at 11:55

## 2017-01-01 RX ADMIN — ACETAMINOPHEN 650 MG: 325 TABLET, FILM COATED ORAL at 23:26

## 2017-01-01 RX ADMIN — GADOBUTROL 7 ML: 604.72 INJECTION INTRAVENOUS at 21:22

## 2017-01-01 RX ADMIN — HYDROCODONE BITARTRATE AND ACETAMINOPHEN 1 TABLET: 5; 325 TABLET ORAL at 01:27

## 2017-01-01 RX ADMIN — SENNOSIDES 1 TABLET: 8.6 TABLET, FILM COATED ORAL at 13:13

## 2017-01-01 RX ADMIN — ANTICOAGULANT CITRATE DEXTROSE SOLUTION FORMULA A 1000 ML: 12.25; 11; 3.65 SOLUTION INTRAVENOUS at 11:15

## 2017-01-01 RX ADMIN — ENOXAPARIN SODIUM 40 MG: 40 INJECTION SUBCUTANEOUS at 14:49

## 2017-01-01 RX ADMIN — DOCUSATE SODIUM 100 MG: 100 CAPSULE, LIQUID FILLED ORAL at 11:09

## 2017-01-01 RX ADMIN — ONDANSETRON HYDROCHLORIDE 4 MG: 2 SOLUTION INTRAMUSCULAR; INTRAVENOUS at 11:47

## 2017-01-01 RX ADMIN — HYDROCODONE BITARTRATE AND ACETAMINOPHEN 2 TABLET: 5; 325 TABLET ORAL at 02:20

## 2017-01-01 RX ADMIN — HYDROCODONE BITARTRATE AND ACETAMINOPHEN 2 TABLET: 5; 325 TABLET ORAL at 05:12

## 2017-01-01 RX ADMIN — HYDROCODONE BITARTRATE AND ACETAMINOPHEN 2 TABLET: 5; 325 TABLET ORAL at 12:00

## 2017-01-01 RX ADMIN — HYDROCODONE BITARTRATE AND ACETAMINOPHEN 2 TABLET: 5; 325 TABLET ORAL at 06:40

## 2017-01-01 RX ADMIN — HEPARIN SODIUM 3200 UNITS: 1000 INJECTION, SOLUTION INTRAVENOUS; SUBCUTANEOUS at 13:00

## 2017-01-01 RX ADMIN — HYDROCODONE BITARTRATE AND ACETAMINOPHEN 2 TABLET: 5; 325 TABLET ORAL at 20:33

## 2017-01-01 RX ADMIN — THERA TABS 1 TABLET: TAB at 08:05

## 2017-01-01 RX ADMIN — CHOLECALCIFEROL TAB 10 MCG (400 UNIT) 400 UNITS: 10 TAB at 13:13

## 2017-01-01 RX ADMIN — DOCUSATE SODIUM 100 MG: 100 CAPSULE, LIQUID FILLED ORAL at 13:13

## 2017-01-01 RX ADMIN — SENNOSIDES 1 TABLET: 8.6 TABLET, FILM COATED ORAL at 20:19

## 2017-01-01 RX ADMIN — LIDOCAINE 1 PATCH: 50 PATCH TOPICAL at 08:04

## 2017-01-01 RX ADMIN — ATENOLOL 25 MG: 25 TABLET ORAL at 10:04

## 2017-01-01 RX ADMIN — HYDROCODONE BITARTRATE AND ACETAMINOPHEN 2 TABLET: 5; 325 TABLET ORAL at 16:00

## 2017-01-01 RX ADMIN — MIRTAZAPINE 15 MG: 15 TABLET, FILM COATED ORAL at 21:07

## 2017-01-01 ASSESSMENT — PAIN DESCRIPTION - DESCRIPTORS
DESCRIPTORS: ACHING

## 2017-01-01 ASSESSMENT — VISUAL ACUITY
OU: GLASSES
OU: NORMAL ACUITY
OU: NORMAL ACUITY
OU: GLASSES
OU: NORMAL ACUITY
OU: GLASSES

## 2017-01-01 ASSESSMENT — ENCOUNTER SYMPTOMS
BACK PAIN: 1
NUMBNESS: 1
FATIGUE: 1

## 2017-01-17 PROBLEM — R53.1 WEAKNESS: Status: ACTIVE | Noted: 2017-01-01

## 2017-01-17 NOTE — IP AVS SNAPSHOT
"` `     [facility]: [phone]                                              INTERAGENCY TRANSFER FORM - NURSING   2017                    Hospital Admission Date: 2017  RODNEY MARIE   : 1945  Sex: Male        Attending Provider: Henry Pantoja DO     Allergies:  Ancef, Penicillins    Infection:  None   Service:  HOSPITALIST    Ht:  1.778 m (5' 10\")   Wt:  77 kg (169 lb 12.1 oz)   Admission Wt:  74.844 kg (165 lb)    BMI:  24.36 kg/m 2   BSA:  1.95 m 2            Patient PCP Information     Provider PCP Type    [facility]      Current Code Status     Date Active Code Status Order ID Comments User Context       Prior      Code Status History     Date Active Date Inactive Code Status Order ID Comments User Context    2017 12:21 PM  Full Code [id number]  Chico Brown MD Outpatient    2017  8:29 PM 2017 12:21 PM Full Code [id number]  Henry Pantoja DO Inpatient      Advance Directives        Does patient have a scanned Advance Directive/ACP document in EPIC?           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) Medium  2017 Yes      Non-Hospital Problems as of 2017              Priority Class Noted    Carcinoma of hepatic duct (H) Medium  2016      Immunizations     None         END      ASSESSMENT     Discharge Profile Flowsheet     EXPECTED DISCHARGE     Patient's communication style  spoken language (English or Bilingual) 17 1214    Expected Discharge Date  17 (home) 17 0803   SKIN      DISCHARGE NEEDS ASSESSMENT     Inspection  Full 17 1509    Equipment Currently Used at Home  none 17 1621   Skin WDL  ex 17 1509    " "GASTROINTESTINAL (ADULT,PEDIATRIC,OB)     Skin Color/Characteristics  redness blanchable (coccyx-mepilex in place) 01/21/17 1509    GI WDL  WDL 01/21/17 1509   Skin Temperature  warm 01/20/17 1652    All Quadrants Bowel Sounds  audible and active in all quadrants 01/21/17 0314   Skin Integrity  -- (left cracked great toe nail) 01/20/17 2238    Last Bowel Movement  01/16/17 (per pt) 01/18/17 2010   SAFETY      Passing flatus  yes 01/20/17 1949   Safety WDL  WDL 01/21/17 1509    COMMUNICATION ASSESSMENT                        Assessment WDL (Within Defined Limits) Definitions           Safety WDL     Effective: 09/28/15    Row Information: <b>WDL Definition:</b> Bed in low position, wheels locked; call light in reach; upper side rails up x 2; ID band on<br> <font color=\"gray\"><i>Item=AS safety wdl>>List=AS safety wdl>>Version=F14</i></font>      Skin WDL     Effective: 09/28/15    Row Information: <b>WDL Definition:</b> Warm; dry; intact; elastic; without discoloration; pressure points without redness<br> <font color=\"gray\"><i>Item=AS skin wdl>>List=AS skin wdl>>Version=F14</i></font>      Vitals     Vital Signs Flowsheet     VITAL SIGNS     Side Effects Monitoring: Respiratory Quality  R 01/21/17 0831    Temp  98.1  F (36.7  C) 01/21/17 1208   Side Effects Monitoring: Respiratory Depth  N 01/21/17 0831    Temp src  Axillary 01/21/17 1208   Side Effects Monitoring: Sedation Level  1 01/21/17 0831    Resp  16 01/21/17 1208   HEIGHT AND WEIGHT      Pulse  127 01/21/17 0719   Height  1.778 m (5' 10\") 01/20/17 1059    Heart Rate  107 01/21/17 1208   Height Method  Stated 01/17/17 1216    Pulse/Heart Rate Source  Monitor 01/21/17 1208   Weight  77 kg (169 lb 12.1 oz) 01/21/17 0523    BP  122/82 mmHg 01/21/17 1208   BSA (Calculated - sq m)  1.95 01/20/17 1059    BP Location  Left arm 01/21/17 1208   BMI (Calculated)  24.5 01/20/17 1059    OXYGEN THERAPY     KALEE COMA SCALE      SpO2  96 % 01/21/17 1506   Best Eye " Response  4-->(E4) spontaneous 01/17/17 2105    O2 Device  None (Room air) 01/21/17 1506   Best Motor Response  6-->(M6) obeys commands 01/17/17 2105    PAIN/COMFORT     Best Verbal Response  5-->(V5) oriented 01/17/17 2105    Patient Currently in Pain  denies 01/21/17 1506   [place] Coma Scale Score  15 01/17/17 2105    0-10 Pain Scale  5 01/21/17 0711   POSITIONING      Pain Location  Shoulder 01/21/17 0711   Body Position  left, side-lying 01/21/17 1506    Pain Orientation  Right 01/21/17 0711   Head of Bed (HOB)  HOB at 30 degrees 01/21/17 0831    Pain Descriptors  -- (achy) 01/19/17 0906   Positioning/Transfer Devices  pillows 01/19/17 1327    Pain Intervention(s)  Medication (See eMAR) 01/21/17 0711   DAILY CARE      Response to Interventions  Decrease in pain 01/21/17 0831   Activity Type  up in chair 01/20/17 1048    ANALGESIA SIDE EFFECTS MONITORING     Activity Level of Assistance  assistance, 2 people 01/20/17 1048            Patient Lines/Drains/Airways Status    Active LINES/DRAINS/AIRWAYS     Name: Placement date: Placement time: Site: Days: Last dressing change:    Peripheral IV 01/17/17 Left Upper arm 01/17/17  1236  Upper arm  4     Peripheral IV 01/18/17 Left Upper forearm 01/18/17  1615  Upper forearm  2     Pressure Injury 01/19/17 Coccyx 01/19/17     2             Patient Lines/Drains/Airways Status    Active PICC/CVC     Name: Placement date: Placement time: Site: Days: Additional Info Last dressing change:    CVC Double Lumen 01/19/17 Right Internal jugular 01/19/17  0817  Internal jugular  2 Securement: Sutured           Description : Tunneled           Placement Verification: Flouroscopy           Catheter Brand: Palindrome           Dressing Intervention: Chlorhexidine sponge;Transparent           Orientation: Right           Inserted by: Cecy           Total Catheter Length (cm): 19 cm           Placement verified by: Cecy           Tip location: SVC/RA Junction           Lot #:  [id number]              Intake/Output Detail Report     Date Intake     Output Net    Shift P.O. I.V. IV Piggyback Total Urine Total       Day 01/20/17 0700 - 01/20/17 1459 -- 223 -- 223 -- -- 223    Windy 01/20/17 1500 - 01/20/17 2259 -- -- -- -- -- -- 0    Noc 01/20/17 2300 - 01/21/17 0659 200 -- -- 200 250 250 -50    Day 01/21/17 0700 - 01/21/17 1459 340 -- -- 340 240 240 100    Windy 01/21/17 1500 - 01/21/17 2259 -- -- -- -- -- -- 0      Last Void/BM       Most Recent Value    Urine Occurrence 1 at 01/21/2017 1400    Stool Occurrence       Case Management/Discharge Planning     Case Management/Discharge Planning Flowsheet     LIVING ENVIRONMENT     ABUSE RISK SCREEN      Lives With  alone 01/20/17 1621   QUESTION TO PATIENT:  Has a member of your family or a partner(now or in the past) intimidated, hurt, manipulated, or controlled you in any way?  no 01/17/17 1220    Living Arrangements  [place] 01/20/17 1621   QUESTION TO PATIENT: Do you feel safe going back to the place where you are living?  yes 01/17/17 1220    EXPECTED DISCHARGE     OBSERVATION: Is there reason to believe there has been maltreatment of a vulnerable adult (ie. Physical/Sexual/Emotional abuse, self neglect, lack of adequate food, shelter, medical care, or financial exploitation)?  no 01/17/17 1220    Expected Discharge Date  01/21/17 (home) 01/18/17 0803   HOMICIDE RISK      FINAL RESOURCES     Homicidal Ideation  no 01/17/17 1220    Equipment Currently Used at Home  none 01/20/17 1621

## 2017-01-17 NOTE — IP AVS SNAPSHOT
` `     [facility]: [phone]                 INTERAGENCY TRANSFER FORM - NOTES (H&P, Discharge Summary, Consults, Procedures, Therapies)   2017                    Hospital Admission Date: 2017  RICK MARIE   : 1945  Sex: Male        Patient PCP Information     Provider PCP Type    [facility]         History & Physicals      H&P by Henry Pantoja DO at 2017  4:49 PM     Author:  Henry Pantoja DO Service:  Hospitalist Author Type:  Physician    Filed:  2017  9:09 PM Note Time:  2017  4:49 PM Status:  Addendum    :  Henry Pantoja DO (Physician)      Related Notes: Original Note by Henry Pantoja DO (Physician) filed at 2017  8:54 PM         [facility]    History and Physical  Hospitalist       Date of Admission:  2017  Date of Service (when I saw the patient): 2017    Assessment and Plan  Rick Marie is a 71 year old male with past medical history including metastatic cholangiocarcinoma presenting with lower extremity weakness.  Admitted for further evaluation and treatment.      Metastatic cholangiocarcinoma, weakness, concern for progression of metastatic disease vs other (Guillian [place]ard, etc): Patient with diffusely metastatic disease, per report. Follows with [facility] per report, see records scanned into chart review. Presented to the emergency department with complaint of bilateral upper and lower extremity weakness.  MRI of the brain, as well as cervical, thoracic, and lumbar spine show multiple areas of metastases, see report for further details.   - MRI with contrast ordered in ED per report.    - Neurology consultation.   - Oncology consultation.    - Neurosurgery consultation.   - Oncology consultation.   - Radiation oncology consult.    - IR consulted per recommendations from Neurology in order to r/o Guillain [place]ard.    -  Therapy evaluations.   - Consider palliative care consultation as clinically indicated.   - Steroids per Neurology discretion.     Small recent cortical brain infarcts: MRI of the brain showed small areas of restricted diffusion in the cortex of the right frontal lobe.  No brain metastases identified, per report.  See report for further details.   - Neurology following, greatly appreciated.   - Permissive hypertension.    - Hold PTA blood pressure medications.     History of hypertension: Patient maintained on amlodipine as well as lisinopril prior to admission.   - Hold PTA Amlodipine    - Hold PTA Lisinopril.    - PRN antihypertensives available.     Hypokalemia: Patient with low potassium and magnesium on admission.   - Monitor and replete.    Anemia, macrocytic, thrombocytopenia, pancytopenia: MCV is 105.  White blood cell count and platelet count are also reduced.  Hemoglobin is 10.0 on admission.   - Monitor.    DVT Prophylaxis: Pneumatic Compression Devices    Code: Full Code    Disposition: Inpatient status. Due to concern for possible Guillian [place], arranged for [facility] bed due to reduced NIF on testing per report.     Dr. Henry Pantoja D.O.  [facility] Hospitalist  Pager [phone]    Primary Care Physician  [facility]    Chief Complaint  Extremity weakness    History is obtained from the patient and medical records.     History of Present Illness  Rick Teixeira is a 71 year old male with past medical history including metastatic cholangiocarcinoma presenting with lower extremity weakness.  Admitted for further evaluation and treatment.  Patient with diffusely metastatic cholangiocarcinoma disease, per report.  Presented to the emergency department with complaint of bilateral upper and lower extremity weakness.  MRI of the brain, as well as cervical, thoracic, and lumbar spine show multiple areas of metastases, see report for further details.  Per report, patient  being treated with gemcitabine and cisplatin with last treatment being completed approximately 3 weeks ago.  The patient reports gradually worsening weakness in the extremities since yesterday, prompting his evaluation in the emergency department.  His chemotherapy regimen was recently adjusted due to lack of response from previous treatment regimen, per report.  The patient reports weakness in extremities, as well as numbness in his fingertips and feet, increased back pain.  Denies headache, falls, chest pain, shortness of breath, abdominal pain, nausea, vomiting, dysuria, rash.    Past Medical History   I have reviewed this patient's medical history and updated it with pertinent information if needed.   Past Medical History   Diagnosis Date     Hypertension      Cancer (H)      liver and lung       Past Surgical History  I have reviewed this patient's surgical history and updated it with pertinent information if needed.  Past Surgical History   Procedure Laterality Date     Genitourinary surgery       testicular cancer       Prior to Admission Medications  Prior to Admission Medications   Prescriptions Last Dose Informant Patient Reported? Taking?   AMLODIPINE BESYLATE PO   Yes Yes   LISINOPRIL PO   Yes Yes      Facility-Administered Medications: None     Allergies  Allergies   Allergen Reactions     Ancef [Cefazolin] Other (See Comments)     Chest heaviness     Penicillins Unknown       Social History  I have reviewed this patient's social history and updated it with pertinent information if needed. Rick Teixeira  reports that he has quit smoking. He does not have any smokeless tobacco history on file. He reports that he does not drink alcohol or use illicit drugs.    Family History  I have reviewed this patient's family history and updated it with pertinent information if needed.     Review of Systems  The 10 point Review of Systems is negative other than noted in the HPI or here.     Physical Exam  Temp:  98.2  F (36.8  C) Temp src: Oral BP: 127/90 mmHg Pulse: 84   Resp: 14 SpO2: 94 % O2 Device: None (Room air)    Vital Signs with Ranges  Temp:  [98.2  F (36.8  C)] 98.2  F (36.8  C)  Pulse:  [] 84  Resp:  [14-18] 14  BP: (127-137)/() 127/90 mmHg  SpO2:  [94 %-100 %] 94 %  165 lbs 0 oz    GENERAL: Alert and oriented. NAD. Conversational, appropriate.   HEENT: Normocephalic. EOMI. No icterus or injection. Nares normal.   LUNGS: Clear to auscultation. No dyspnea at rest.   HEART: Regular rate. Extremities perfused.   ABDOMEN: Soft, nontender, and nondistended. Positive bowel sounds.   EXTREMITIES: No LE edema noted.   NEUROLOGIC: Moves extremities x4 on command. Global weakness present.     Data  Data reviewed today:  I personally reviewed both laboratory and imaging data.     Recent Labs  Lab 01/17/17  1228   WBC 3.7*   HGB 10.0*   *   *   INR 1.11      POTASSIUM 3.3*   CHLORIDE 102   CO2 30   BUN 16   CR 1.17   ANIONGAP 5   SERINA 9.5   GLC 99   ALBUMIN 3.2*   PROTTOTAL 7.2   BILITOTAL 0.8   ALKPHOS 242*   ALT 28   AST 51*       Recent Results (from the past 24 hour(s))   MR Cervical Spine w/o Contrast    Narrative    MR CERVICAL SPINE W/O CONTRAST   1/17/2017 3:03 PM     HISTORY: bilateral UE/LE weakness    TECHNIQUE:  Multiplanar multisequence MRI of the cervical spine  without gadolinium contrast.     COMPARISON:  None.    FINDINGS: Motion artifact degrades quality of these images.  Infiltrative lesions are seen within the C7 and T2 and T3 vertebral  bodies. This is consistent with metastatic disease to these vertebrae.  There is abnormal soft tissue extending from the T2 vertebral body  into the right ventral epidural space and right T2-T3 neural foramen.  This could affect the right T2 nerve root. There is moderate central  spinal stenosis at C6-7 due to bulging disc and associated  osteophytes. There is mild flattening of the cord at this level due to  the degenerative  change.    C2-C3:  Normal disc, facet joints, spinal canal and neural foramina.    C3-C4:  Mild degenerative change in the facet joints.    C4-C5:  Mild annular bulge. Central canal still adequate. Moderate  foraminal stenosis due to uncinate spurs and loss of disc space  height.    C5-C6:  Loss of disc space height. Diffuse annular disc bulge. Central  canal mildly narrowed due to the bulging disc. Moderate bilateral  foraminal stenosis due to uncinate spurs.    C6-C7:  Degeneration of the disc. Diffuse annular disc bulge with  associated posterior osteophytes leading to moderate central stenosis  and mild flattening of the cord. There is also moderate bilateral  foraminal stenosis at this level due to loss of disc space height and  uncinate spurs.    C7-T1: Degeneration of the disc. Mild annular disc bulge. Moderate  bilateral foraminal stenosis due to uncinate spurs.      Impression    IMPRESSION:    1. Study is consistent with bone metastasis to C7, T2, and T3. There  is a small amount of epidural tumor at the right T2 level which also  extends into the right T2-3 neural foramen. This could affect the  right T2 nerve root. No central stenosis is seen at this level.  2. Multilevel degenerative change. The degenerative changes are  leading to moderate central stenosis at C6-7 with mild flattening of  the cord at this level.  3. There is also moderate foraminal stenosis at multiple levels due to  loss of disc space height and uncinate spurs.    KEILY GARCIA MD   MR Thoracic Spine w/o Contrast    Narrative    MR THORACIC SPINE W/O CONTRAST 1/17/2017 3:04 PM     HISTORY: limited movement of UE/LE    TECHNIQUE: Multiplanar multisequence MRI of the thoracic spine without  gadolinium contrast.    COMPARISON: None.    FINDINGS: Inversion recovery images reveal infiltrative lesions within  the the C7, T2 and T12 vertebral bodies. There is also an infiltrative  process in the T9 lamina and spinous process. These are  consistent  with metastatic disease. A very small amount of epidural tumor is seen  at the right T3 2 level causing only slight mass effect on the dural  sac. This is better seen on the MR scan of the cervical spine. There  are multilevel degenerative changes with loss of disc space height  throughout the thoracic spine. Mild bulging discs are seen at T4-5,  C5-6, and there is a small central disc protrusion at T6-7.      Impression    IMPRESSION:    1. Infiltrative lesions within the C7 and T2, and T12 vertebral bodies  and within the T9 lamina and spinous process. This is consistent with  metastatic disease.  2. Small amount of epidural tumor at the T2 level but no cord  compression or central stenosis.  3. Multilevel degenerative change.    KEILY GARCIA MD   MR Brain w/o Contrast    Narrative    MR BRAIN W/O CONTRAST 1/17/2017 3:04 PM     HISTORY: bilateral UE/LE weakness    TECHNIQUE: Multiplanar, multisequence MRI of the brain without IV  contrast material. No contrast was administered because of the length  of the examination. The patient was becoming uncomfortable.    COMPARISON: None.    FINDINGS: Motion artifact degrades quality of these images.  There is  generalized atrophy of the brain. . There are small areas of  restricted diffusion in the cortex of the right frontal lobe. These  measure about 4 mm in size. These would be compatible with recent  cortical infarcts. No lesions are seen in the cerebellum or left  hemisphere.. . The facial structures appear normal. The arteries at  the base of the brain and the dural venous sinuses appear patent. No  brain metastasis are identified. No mass lesions are seen.      Impression    IMPRESSION:   1. Small areas of restricted diffusion in the cortex of the right  frontal lobe. These are consistent with small recent cortical  infarcts.  2. No brain metastasis are identified. No mass lesions are seen.  3. Generalized atrophy of the brain. .  4. No skull metastasis  are identified.     KEILY GARCIA MD   MR Lumbar Spine w/o Contrast    Narrative    MR LUMBAR SPINE WITHOUT CONTRAST1/17/2017 2:59 PM      HISTORY: History of cancer, increasing weakness.    TECHNIQUE:  Multiplanar, multisequence MRI of the lumbar spine without  contrast.     COMPARISON: None.    FINDINGS:This report assumes five lumbar type vertebrae.     The conus and visualized portions of the thoracic spinal cord appear  normal. The paraspinal soft tissues appear normal as visualized. The  bone marrow has normal signal intensity. No bone metastasis are seen  in the lumbar region. No central spinal stenosis or cord compression.    L1-L2:   Normal disc, facet joints, spinal canal and neural foramina.    L2-L3:  Normal disc, facet joints, spinal canal and neural foramina.    L3-L4:  Degeneration of the disc. Diffuse annular disc bulge. There is  a small more right central disc herniation causing mild mass effect on  the dural sac. Central canal is mildly narrowed due to this right  central disc herniation. This herniation extends slightly inferior to  the level of the disc and posterior to the L4 vertebral body.    L4-L5:  Degeneration of the disks. Loss of disc space height. Diffuse  annular disc bulge. There is disc material extending into the right  and left neural foramen causing moderate bilateral foraminal stenosis.  Mild central stenosis due to the bulging disc and degenerative change  off the facet joints.    L5-S1:  Normal disc, facet joints, spinal canal and neural foramina.      Impression    IMPRESSION:   1. No bone metastasis are seen in the lumbar region.  2. Multilevel degenerative change.  3. Moderate size right central disc herniation at L3-L4 causing mild  mass effect on the dural sac. This could affect the right L5 nerve  root as it arises from the sac. It is also causing mild central  stenosis.  4. Moderate bilateral L4-5 foraminal stenosis due to a bulging disc  and degenerative change off the  facet joints. There is also moderate  central stenosis and lateral recess stenosis at this level due to  degenerative change off the facet joints and a bulging disc.    KEILY GARCIA MD                       Discharge Summaries      Discharge Summaries by Chico Brown MD at 1/21/2017 12:18 PM     Author:  Chico Brown MD Service:  Hospitalist Author Type:  Physician    Filed:  1/21/2017 12:57 PM Note Time:  1/21/2017 12:18 PM Status:  Signed    :  Chico Brown MD (Physician)           [facility]  Discharge Summary        Rick Teixeira MRN# [id number]   YOB: 1945 Age: 71 year old     Date of Admission:  1/17/2017  Date of Discharge:  1/21/2017  Admitting Physician:  Henry Pantoja DO  Discharge Physician: Chico Brown MD  Discharging Service: Hospitalist     Primary Provider: Gianna [facility]  Primary Care Physician Phone Number: [phone]         Discharge Diagnoses:      Bilateral arm weakness  Bilateral leg weakness  Bone metastasis (H)  A.fib        Problem Oriented Hospital Course (Providers):    Rick Teixeira was admitted on 1/17/2017 by Henry Pantoja DO and I would refer you to their history and physical.  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  MRI brain with recent infarcts, however, location would not explain his diffuse weakness.  MRI spine (cervical, thoracic and lumbar) with metastatic lesions at C7, T2 and T12 but negative for cord impingement.    --- Placed in IMC for close monitoring upon admission.  ---- No sign of respiratory involvement. Patient was able to achieve a NIF -80 and VC 1700 ml with good effort.  ----Underwent LP 1/18 with finding of elevated protein level (85) with 0 WBC.    ----Per Neuro, EMG 1/18 also abnormal  concerning for Guillain-Hallock.    ----Nephrology consulted, tunneled cath placed 1/19 by IR.  --- Plasma exchange was started on 1/19 and 2nd was yesterday  --- Followed by Nephrology and neurology    Metastatic cholangiocarcinoma:  Follows with [facility].  MRI demonstrating metastatic disease as noted.  Radiation Oncology consulted, radiation treatment to be determined.  MOHPA consulted, was to start new FOLFOX regimen next week but further chemo on hold until functional status improved.    Small recent cortical brain infarcts: MRI of the brain showed small areas of restricted diffusion in the cortex of the right frontal lobe.  CT angio 1/18 without occlusion or stenosis.  No brain metastases identified.  Likely embolic in setting of A-fib.  TTE with EF 50-55%, negative bubble. TSH, A1C wnl.  LDL elevated at 117.  - Neuro recommends anticoagulation with apixaban once plasma exchange complete, will also need statin  - Permissive HTN  - SLP evaluation was done and recommend regular diet    Paroxysmal A-fib:  Family reports history of A-fib diagnosed at prior hospitalization.  Decision was made against anticoagulation due to limited life expectancy despite hypercoagulable state of malignancy.  --- PTA atenolol was resumed  --- Metoprolol IV prn for HR >120    Hypertension: PTA atenolol and amlodipine was pm hold for permissive HTN.     - resume BB and CCB as indicated  - lisinopril needs to be held until plasmapheresis complete    Hypokalemia: Patient with low potassium and magnesium on admission.  - Electrolyte protocol    Anemia, macrocytic, thrombocytopenia, pancytopenia:  Presume secondary to chemotherapy.  MCV is 105.  White blood cell count and platelet count are also reduced.  Hemoglobin is 10.0 on admission.  - cell counts stable, monitor    Right posterior Shoulder pain: Lidocain patch    Patient and family wants to be transfer to [facility]. Call [facility] and discussed with accepting physician   "Corrina and patient will be sent in ambulance and direct admit to neuro ICU. Patient found to be stable prior to discharge.         Code Status:      Full Code        Brief Hospital Stay Summary Sent Home With Patient in AVS:       Rick Teixeira is a 71 year old male who was admitted on 1/17/2017.  Past medical history including metastatic cholangiocarcinoma presenting with acute lower extremity weakness and managed as described problem oriented hospital stay.        Important Results:      See below.         Pending Results:        Unresulted Labs Ordered in the Past 30 Days of this Admission     Date and Time Order Name Status Description    1/19/2017 1455 Paraneoplastic antibody In process             Discharge Instructions and Follow-Up:            Discharge Disposition:      Discharged to home        Discharge Medications:        Current Discharge Medication List      CONTINUE these medications which have NOT CHANGED    Details   AMLODIPINE BESYLATE PO Take 5 mg by mouth daily       LISINOPRIL PO Take 10 mg by mouth daily       ATENOLOL PO Take 25 mg by mouth daily      DOCUSATE SODIUM PO Take by mouth daily      sennosides (SENOKOT) 8.6 MG tablet Take 1 tablet by mouth 2 times daily      VITAMIN D, CHOLECALCIFEROL, PO Take 400 Units by mouth daily      multivitamin, therapeutic (THERA-VIT) TABS tablet Take 1 tablet by mouth daily      PROCHLORPERAZINE MALEATE PO Take 10 mg by mouth daily                Allergies:         Allergies   Allergen Reactions     Ancef [Cefazolin] Other (See Comments)     Chest heaviness     Penicillins Unknown           Consultations This Hospital Stay:      Consultation during this admission received from neurology, nephrology and oncology        Condition and Physical on Discharge:      Discharge condition: Stable   Vitals: Heart Rate: 107, Blood pressure 122/82, pulse 127, temperature 98.1  F (36.7  C), temperature source Axillary, resp. rate 16, height 1.778 m (5' 10\"), weight " 77 kg (169 lb 12.1 oz), SpO2 96 %.     Constitutional: Awake, alert. No apparent distress   Lungs: Clear to auscultation bilaterally, no crackles or wheezing   Cardiovascular: Regular HR, normal S1 and S2, and no murmur noted   Abdomen: Normal bowel sounds, soft, non-distended, non-tender   Skin: No rashes, no cyanosis.   Neurology Motor:   Flaccid tone in all four extremities slightly improved since admisssion. move his fingers and toe. No  of the right arm, minimal  on the left side.  No ability to lift his legs only slight internal/external rotation of the legs.  Some atrophy            Reflexes:  Areflexic, toe signs neutral       Sensory:  No pinprick sensation below the shoulder level.  Vibration sense is intact at the shoulder, not present at the elbow hands knees or feet.                                   Discharge Time:      Greater than 30 minutes.        Image Results From This Hospital Stay (For Non-EPIC Providers):        Results for orders placed or performed during the hospital encounter of 01/17/17   MR Cervical Spine w/o Contrast    Narrative    MR CERVICAL SPINE W/O CONTRAST   1/17/2017 3:03 PM     HISTORY: bilateral UE/LE weakness    TECHNIQUE:  Multiplanar multisequence MRI of the cervical spine  without gadolinium contrast.     COMPARISON:  None.    FINDINGS: Motion artifact degrades quality of these images.  Infiltrative lesions are seen within the C7 and T2 and T3 vertebral  bodies. This is consistent with metastatic disease to these vertebrae.  There is abnormal soft tissue extending from the T2 vertebral body  into the right ventral epidural space and right T2-T3 neural foramen.  This could affect the right T2 nerve root. There is moderate central  spinal stenosis at C6-7 due to bulging disc and associated  osteophytes. There is mild flattening of the cord at this level due to  the degenerative change.    C2-C3:  Normal disc, facet joints, spinal canal and neural foramina.    C3-C4:   Mild degenerative change in the facet joints.    C4-C5:  Mild annular bulge. Central canal still adequate. Moderate  foraminal stenosis due to uncinate spurs and loss of disc space  height.    C5-C6:  Loss of disc space height. Diffuse annular disc bulge. Central  canal mildly narrowed due to the bulging disc. Moderate bilateral  foraminal stenosis due to uncinate spurs.    C6-C7:  Degeneration of the disc. Diffuse annular disc bulge with  associated posterior osteophytes leading to moderate central stenosis  and mild flattening of the cord. There is also moderate bilateral  foraminal stenosis at this level due to loss of disc space height and  uncinate spurs.    C7-T1: Degeneration of the disc. Mild annular disc bulge. Moderate  bilateral foraminal stenosis due to uncinate spurs.      Impression    IMPRESSION:    1. Study is consistent with bone metastasis to C7, T2, and T3. There  is a small amount of epidural tumor at the right T2 level which also  extends into the right T2-3 neural foramen. This could affect the  right T2 nerve root. No central stenosis is seen at this level.  2. Multilevel degenerative change. The degenerative changes are  leading to moderate central stenosis at C6-7 with mild flattening of  the cord at this level.  3. There is also moderate foraminal stenosis at multiple levels due to  loss of disc space height and uncinate spurs.    KEILY GARCIA MD   MR Thoracic Spine w/o Contrast    Narrative    MR THORACIC SPINE W/O CONTRAST 1/17/2017 3:04 PM     HISTORY: limited movement of UE/LE    TECHNIQUE: Multiplanar multisequence MRI of the thoracic spine without  gadolinium contrast.    COMPARISON: None.    FINDINGS: Inversion recovery images reveal infiltrative lesions within  the the C7, T2 and T12 vertebral bodies. There is also an infiltrative  process in the T9 lamina and spinous process. These are consistent  with metastatic disease. A very small amount of epidural tumor is seen  at the right T3  2 level causing only slight mass effect on the dural  sac. This is better seen on the MR scan of the cervical spine. There  are multilevel degenerative changes with loss of disc space height  throughout the thoracic spine. Mild bulging discs are seen at T4-5,  C5-6, and there is a small central disc protrusion at T6-7.      Impression    IMPRESSION:    1. Infiltrative lesions within the C7 and T2, and T12 vertebral bodies  and within the T9 lamina and spinous process. This is consistent with  metastatic disease.  2. Small amount of epidural tumor at the T2 level but no cord  compression or central stenosis.  3. Multilevel degenerative change.    KEILY GARCIA MD   MR Brain w/o Contrast    Narrative    MR BRAIN W/O CONTRAST 1/17/2017 3:04 PM     HISTORY: bilateral UE/LE weakness    TECHNIQUE: Multiplanar, multisequence MRI of the brain without IV  contrast material. No contrast was administered because of the length  of the examination. The patient was becoming uncomfortable.    COMPARISON: None.    FINDINGS: Motion artifact degrades quality of these images.  There is  generalized atrophy of the brain. . There are small areas of  restricted diffusion in the cortex of the right frontal lobe. These  measure about 4 mm in size. These would be compatible with recent  cortical infarcts. No lesions are seen in the cerebellum or left  hemisphere.. . The facial structures appear normal. The arteries at  the base of the brain and the dural venous sinuses appear patent. No  brain metastasis are identified. No mass lesions are seen.      Impression    IMPRESSION:   1. Small areas of restricted diffusion in the cortex of the right  frontal lobe. These are consistent with small recent cortical  infarcts.  2. No brain metastasis are identified. No mass lesions are seen.  3. Generalized atrophy of the brain. .  4. No skull metastasis are identified.     KEILY GARCIA MD   MR Lumbar Spine w/o Contrast    Narrative    MR LUMBAR SPINE  WITHOUT CONTRAST1/17/2017 2:59 PM      HISTORY: History of cancer, increasing weakness.    TECHNIQUE:  Multiplanar, multisequence MRI of the lumbar spine without  contrast.     COMPARISON: None.    FINDINGS:This report assumes five lumbar type vertebrae.     The conus and visualized portions of the thoracic spinal cord appear  normal. The paraspinal soft tissues appear normal as visualized. The  bone marrow has normal signal intensity. No bone metastasis are seen  in the lumbar region. No central spinal stenosis or cord compression.    L1-L2:   Normal disc, facet joints, spinal canal and neural foramina.    L2-L3:  Normal disc, facet joints, spinal canal and neural foramina.    L3-L4:  Degeneration of the disc. Diffuse annular disc bulge. There is  a small more right central disc herniation causing mild mass effect on  the dural sac. Central canal is mildly narrowed due to this right  central disc herniation. This herniation extends slightly inferior to  the level of the disc and posterior to the L4 vertebral body.    L4-L5:  Degeneration of the disks. Loss of disc space height. Diffuse  annular disc bulge. There is disc material extending into the right  and left neural foramen causing moderate bilateral foraminal stenosis.  Mild central stenosis due to the bulging disc and degenerative change  off the facet joints.    L5-S1:  Normal disc, facet joints, spinal canal and neural foramina.      Impression    IMPRESSION:   1. No bone metastasis are seen in the lumbar region.  2. Multilevel degenerative change.  3. Moderate size right central disc herniation at L3-L4 causing mild  mass effect on the dural sac. This could affect the right L5 nerve  root as it arises from the sac. It is also causing mild central  stenosis.  4. Moderate bilateral L4-5 foraminal stenosis due to a bulging disc  and degenerative change off the facet joints. There is also moderate  central stenosis and lateral recess stenosis at this level due  to  degenerative change off the facet joints and a bulging disc.    KEILY GARCIA MD   MR Cervical Spine w Contrast    Narrative    MR CERVICAL SPINE WITH CONTRAST 1/17/2017 9:39 PM     HISTORY: Metastatic disease. Evaluation needed with contrast for  treatment purposes.    TECHNIQUE: Postcontrast images were obtained through the cervical  spine with 7 mL of Gadavist.    COMPARISON: MR cervical spine without contrast on same date.    FINDINGS: Postcontrast images show enhancement of the bone lesions in  the C7 and T2 vertebral bodies consistent with osseous metastases. In  addition, there is a small amount of enhancing epidural tumor at the  T2 level in the right anterolateral epidural space causing some mild  central canal stenosis but no cord deformity. There is also enhancing  soft tissue in the right C6-C7 and C7-T1 neural foramen as well as the  right T2-T3 neural foramen consistent with some tumor. There is also a  small amount of epidural tumor in the right anterior epidural space at  C7. There is no evidence for any intradural tumor any intramedullary  tumor. Degenerative changes are also superimposed most advanced at  C6-C7 where there is moderate central canal and bilateral neural  foraminal stenosis along with mild cord deformity. This is just above  the small amount of epidural tumor at C7.      Impression    IMPRESSION: Osseous metastases at C7 and T1 with some epidural tumor  in the right anterior lateral epidural spaces at these levels as well  as some epidural tumor in the right side of neural foramen at C6-C7,  C7-T1, and T2-T3. The epidural tumor is not causing any cord  impingement at this time. There is moderate facet degenerative central  canal stenosis at C6-C7 with some mild cord deformity.    SO MALONE MD   XR Lumbar Puncture Spinal Tap Diag    Narrative    EXAM: XR LUMBAR PUNCTURE SPINAL TAP DIAGNOSTIC  1/18/2017 9:43 AM       History:  Extremity weakness, rule out Guillain [place].      PROCEDURE: The patient consented for a lumbar puncture. The benefits  and risks of the procedure were explained to the patient, including  but not limited to worsening headache, hemorrhage, infection, lower  extremity pain, or nerve root injury. The patient was sterilely  prepped and draped with the patient in the supine position, over the  lower back. Under fluoroscopic guidance, the interlaminar spaces were  noted. 1% lidocaine was administered for local anesthetic over the  L2-3 interlaminar space, and a 22 gauge needle was advanced into the  thecal sac under fluoroscopic guidance. There was initial aspiration  of clear CSF. About 8 cc's total were aspirated.  The needle was  removed. There were no immediate complications associated with the  procedure.       Fluoro time: 0.2 minutes.   Number of Images: 2      Impression    IMPRESSION: Successful lumbar puncture.    JUAN NGUYEN PA-C   CT Head Neck Angio w/o & w Contrast    Narrative    CT ANGIOGRAM OF THE HEAD AND NECK WITHOUT AND WITH CONTRAST  1/18/2017  6:08 PM     HISTORY: Admitted yesterday for generalized weakness. Evaluate for  stroke. Small infarcts in the cortex of the right frontal lobe on  yesterday's MRI. Known osseous metastases and C7-T1 epidural tumor.    TECHNIQUE:  Precontrast localizing scans were followed by CT  angiography with an injection of 70 mL Isovue-370 IV with scans  through the head and neck.  Images were transferred to a separate 3-D  workstation where multiplanar reformations and 3-D images were  created.  Estimates of carotid stenoses are made relative to the  distal internal carotid artery diameters except as noted. Radiation  dose for this scan was reduced using automated exposure control,  adjustment of the mA and/or kV according to patient size, or iterative  reconstruction technique.    COMPARISON: MRI yesterday.    CT HEAD FINDINGS:  No contrast enhancing lesions.  Cerebral blood flow  is grossly normal.    CT  ANGIOGRAM HEAD FINDINGS:  Arteries are widely patent with no  aneurysm, significant stenosis, occlusion or intraarterial thrombus.  Venous circulation is unremarkable.    CT ANGIOGRAM NECK FINDINGS:   Right carotid artery: No significant stenosis.      Left carotid artery: Minimal calcified plaque.  No significant  stenosis.      Vertebral arteries: No significant stenosis.      Other findings: There is a dominant poorly circumscribed nodule in the  right upper lobe about 2 cm diameter. There are also numerous much  smaller peripheral nodules in the upper lobes. The appearance is  suggestive of metastatic disease. Destructive lesion is seen in the C7  vertebral body.      Impression    IMPRESSION:   1. Minimal plaque in the proximal left internal carotid artery.  2. Otherwise normal CT angiogram of the head and neck.  3. Bone and lung metastases.    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    FINDINGS: The procedure and risks were explained to the patient in  detail and informed consent was obtained. Maximal Sterile Barrier  Technique Utilized: Cap AND mask AND sterile gown AND sterile gloves  AND sterile full body drape AND hand hygiene AND skin preparation 2%  chlorhexidine for cutaneous antisepsis (or acceptable alternative  antiseptics).       Sterile Ultrasound Technique Utilized ?Sterile gel AND sterile probe  covers. The patient was prepped and draped, and 1% lidocaine was used  as local anesthetic. Ultrasound was used to document location and  patency of the right internal jugular vein, and a permanent image was  saved. Under sterile ultrasound guidance, a puncture was made into the  right internal jugular vein, and a 5 Guinean dilator was advanced over  a wire into the right atrium. A  subcutaneous tunnel was then created  along the right anterior chest wall to the internal jugular access  site. A [employer] 14.5 [place] 19 cm tip to cuff pheresis  catheter was then advanced through the tunnel. The dilator was then  exchanged for a peel-away sheath, and pheresis catheter was advanced  through the peel-away sheath into the right atrium. The peel-away  sheath was then removed. Both ports of the pheresis catheter were  aspirated and flushed and demonstrated good function and was  subsequently hep-locked. The catheter was sutured to the skin site  with 2-0 Ethilon. SecureSeal adhesive was applied to the neck incision  site.    A permanent fluoroscopic image was obtained documenting the tip of the  pheresis catheter in the upper right atrium. There were no immediate  complications.    Fluoroscopy time: 0.1 minutes.    Total fluoroscopy dose: 1 mGy.    Local anesthetic: 17 mL 1% lidocaine.    Conscious sedation: 1 mg IV Versed, 25 mg IV fentanyl.     Sedation time: 20 minutes.    The patient was monitored by radiology nursing staff under my  supervision and remained stable throughout the study.      Impression    IMPRESSION: Successful right tunneled pheresis catheter placement.    MAGGY ROBISON MD           Most Recent Lab Results In EPIC (For Non-EPIC Providers):    Most Recent 3 CBC's:  Recent Labs   Lab Test  01/20/17   0540  01/19/17   0715  01/18/17   0510   WBC  5.9  5.6  4.3   HGB  11.0*  10.0*  10.7*   MCV  104*  104*  103*   PLT  112*  135*  155      Most Recent 3 BMP's:  Recent Labs   Lab Test  01/20/17   0540  01/19/17   0715  01/18/17   0510   NA  139  138  137   POTASSIUM  4.6  3.9  4.6   CHLORIDE  109  105  103   CO2  20  25  25   BUN  23  25  21   CR  1.13  1.15  1.16   ANIONGAP  10  8  9   SERINA  8.2*  8.6  9.1   GLC  104*  112*  185*     Most Recent 3 Troponin's:  Recent Labs   Lab Test  01/18/17   0510   TROPI  0.070*     Most Recent 3 INR's:  Recent Labs   Lab Test  01/19/17 0715   01/17/17   1228   INR  1.15*  1.11     Most Recent 2 LFT's:  Recent Labs   Lab Test  01/19/17   0715  01/18/17   0510   AST  84*  79*   ALT  45  33   ALKPHOS  237*  243*   BILITOTAL  0.5  0.8     Most Recent Cholesterol Panel:  Recent Labs   Lab Test  01/18/17   0510   CHOL  197   LDL  117*   HDL  54   TRIG  131     Most Recent 6 Bacteria Isolates From Any Culture (See EPIC Reports for Culture Details):No lab results found.  Most Recent TSH, T4 and HgbA1c:   Recent Labs   Lab Test  01/18/17   0510   TSH  0.81   A1C  4.8            Huber Brown MD  Internal medicine Hospitalist:   Pager [phone]    1/21/2017                        Consult Notes      Consults signed by Michael Lopez MD at 1/19/2017  9:22 AM      Author:  Michael Lopez MD Service:  Nephrology Author Type:  Physician    Filed:  1/19/2017  9:22 AM Note Time:  1/18/2017  4:37 PM Status:  Signed    :  Michael Lopez MD (Physician)           NEPHROLOGY CONSULTATION      DATE OF SERVICE:  01/18/2017      REQUESTING PHYSICIAN:  Dr. Gauthier      HISTORY OF PRESENT ILLNESS:  Rick Teixeira is a 71-year-old man whom we were asked to see to assist in evaluation and management of symptoms of rapid-onset generalized weakness.  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      Mr. Teixeira has a complicated medical history otherwise.  He has a diagnosis of metastatic cholangiocarcinoma with bone metastases; the possibility of a paraneoplastic syndrome was entertained but felt unlikely.  The patient has a history of chronic atrial fibrillation but no other significant heart history.  He has not been on recent anticoagulant therapy.  He has had previous brain imaging that shows small old right cortical ischemic strokes.  He has other diagnoses which include hypertension for which he has been on a 3-drug regimen of  amlodipine, lisinopril and atenolol; all of these have been withheld since his admission with weakness yesterday afternoon.  The key diagnostic tests used in the diagnosis of Guillain-[place] have been analysis of the spinal fluid, which shows an elevated protein with no other abnormalities, and also a compatible EMG.      MEDICATIONS:   1.  Docusate.   2.  Cholecalciferol.   3.  Multivitamin.   4.  Senokot.      ALLERGIES:  He has allergies to cephalosporins and penicillins.      LABORATORY DATA:  Since admission includes a CBC which demonstrates normochromic normocytic anemia with a hemoglobin of approximately 10 to 10.5.  White blood cell count and platelet count are normal.  Differential has not been done.  INR is 1.11.  Electrocardiogram shows atrial fibrillation with a controlled ventricular response and no other significant findings.  Of unclear significance is T-wave inversion in the anterior precordial leads.  An echocardiogram shows borderline reduced left ventricular systolic function with ejection fraction 50% to 55%.  The right ventricular systolic function is normal.  There is a trivial pericardial effusion.  The left ventricle shows no sign of hypertrophy.  Mild global hypokinesia is seen.  There are no significant valvular abnormalities and the IVC appears normal.  Blood chemistries show normal electrolytes, creatinine is approximately 1.15 and a BUN of 21.  Calcium is satisfactory at 9.1.  Albumin is 3.1.  There is a modest increase in alkaline phosphatase to 243 and AST at 79, but other liver function tests are normal.  Initial blood glucose is 99 and on repeat today 185.  The patient did receive a course of dexamethasone yesterday, which has subsequently been discontinued.      PHYSICAL EXAMINATION:   GENERAL:  Mr. Teixeira is alert, calm.  He seems lucid.  There is no speech abnormality.  There is no sign of facial weakness.  He does not complain of any respiratory distress.   VITAL SIGNS:   Temperature is normal, pulse is 80-90 and irregular rate.  Blood pressure most recently is 119/87, earlier about 135-145 over 90-95.  Respirations are unlabored at about 14-16.  Room air oxygen saturation is 97% to 98%.   SKIN:  Shows good turgor.  It is somewhat pale.   HEENT:  Mucous membranes are moist.   NECK:  Supple.  There is no JVD.  Carotid pulses are strong.   LUNGS:  Clear.   HEART:  Normal, aside from the irregular rhythm.  There is no murmur, rub or gallop.   ABDOMEN:  Soft.  Bowel sounds are normally active.  No tenderness, organomegaly or masses are noted.   EXTREMITIES:  Warm.  Peripheral pulses are strong.  There are no significant joint deformities.  There is no significant edema.  Mr. Teixeira's weakness is seemingly symmetric and involves all 4 limbs but seems at this time to be sparing his respiratory muscles.      Mr. Teixeira's clinical presentation is compatible with an acute polyneuropathy, suggesting a Guillain-Stanton type immunologic etiology.  I agree with the indication for plasma exchange therapy.  The patient has given his verbal consent for placement of a tunneled IJ double-lumen catheter for access and the apheresis treatment.  Interventional Radiology will place this catheter on the morning of , and the first plasma exchange treatment will begin shortly thereafter.  We anticipate an initial course of 150% plasma volume exchanges at least for the first 2 treatments.  Subsequent total treatment plan of 5-7 sessions will then take place on about an every other day basis after the initial 2 treatments are done on consecutive days.      Thank you for the opportunity to assist in Mr. Teixeira's care.  We will follow him with you as appropriate during the remainder of his hospital stay.         CHERYL MORENO MD             D: 2017 16:37   T: 2017 17:27   MT: TONE      Name:     RODNEY TEIXEIRA   MRN:      [id number]        Account:       [id number]   :       1945           Consult Date:  01/18/2017      Document: [id number]       cc: Mindy Gauthier MD        Consults signed by Torres Vincent MD at 1/19/2017  8:40 AM      Author:  Torres Vincent MD Service:  Radiation Oncology Author Type:  Physician    Filed:  1/19/2017  8:40 AM Note Time:  1/18/2017  4:44 PM Status:  Signed    :  Torres Vincent MD (Physician)       Consult Orders:    1. Radiation Oncology IP Consult: Patient to be seen: Routine - within 24 hours; metastatic cholangiocarcinoma to spine; Consultant may enter orders: Yes [[id number]] ordered by Henry Pantoja DO at 01/17/17 1749                RADIATION ONCOLOGY CONSULTATION      DATE OF SERVICE:  01/18/2017      REQUESTING PHYSICIAN:  Dr. Lei Starks       OTHER PHYSICIANS:  Dr. Mindy Lombardo, Dr. Patsy Gauthier.      REASON FOR CONSULTATION:  Consideration of palliative radiotherapy in the setting of multiple presumed spine metastases in the setting of leg weakness.      HISTORY OF PRESENT ILLNESS:  Mr. Rick Teixeira is a 71-year-old gentleman with progressive metastatic intrahepatic cholangiocarcinoma, who presented with profound weakness of all extremities, as described below.  Of note, the patient has a history of left testicular seminoma diagnosed in 2001, treated with left radical orchiectomy at [facility] in [place], followed by adjuvant radiotherapy to the kelsie regions (records of this are unavailable to me currently).  The patient more recently presented in 07/2016 with progressive shortness of breath and cough, with eventual imaging including torso CT showing an 8 cm heterogeneous mass in the right lobe of the liver with underlying cirrhosis, multiple pulmonary nodules bilaterally, the largest measuring 1.3 cm, bilateral pleural nodularity, and bilateral hilar and mediastinal adenopathy.  Biopsy of the liver mass on 07/11/2016 revealed  poorly differentiated adenocarcinoma, most consistent with intrahepatic cholangiocarcinoma.  The patient has undergone evaluation both at the [facility] and is also cared for by Dr. Lombardo.  He began cisplatin/gemcitabine chemotherapy on 08/03/2016, and after 7 cycles of this he reportedly developed disease progression based on CT scans at the [facility] in 12/2016 (these scans are unavailable to me at this time).  His chemotherapy was therefore discontinued on 12/29/2016.      The patient has since not had any rapidly progressive symptoms including no increasing dyspnea.  However, approximately 48 hours ago the patient noted a relatively rapid onset of weakness in all 4 extremities, the right greater than the left, such that he became unable to ambulate or even rise from a supine position.  He was evaluated in the [facility] Emergency Department on 01/17/2017, at which time spine MRI revealed likely metastases to C7, T2 and T3, with a small amount of epidural tumor at the right T2 level that was also extending into the right T2-3 neural foramen, but with no central stenosis at that level.  There were additional but less concerning lesions involving T9 and T12.  Degenerative changes and bulging disks in the lumbar spine caused moderate bilateral L4-5 foraminal stenosis, but there was no evidence of metastatic disease in the lumbar spine.  Brain MRI on the same day revealed small areas of restricted diffusion in the right frontal cortex consistent with small recent cortical infarcts, but no mass lesions were seen.      The patient was evaluated by Dr. Gauthier of Neurology and was noted to have absent reflexes throughout.  He has undergone workup for possible Guillain-Nelson syndrome, including lumbar puncture earlier this morning, which revealed an elevated CSF protein, consistent with this diagnosis.  Notably, IV dexamethasone had been started last night after his admission and he developed an episode  of encephalopathy/confusion overnight possibly related to this, but describes essentially no improvement in his diffuse weakness of all extremities.  He denies any pain/numbness/tingling involving any of his extremities.  He notes mild mid-back pain, but describes this as chronic and related to prior sports injuries, with no recent progression.      REVIEW OF SYSTEMS:  Neurologic symptoms as described above.  The patient denies any fevers, chills, headaches, vision changes, sore throat, chest pain, palpitations, shortness of breath, cough, abdominal pain, nausea, diarrhea, dysuria, muscle aches, sensitivity to cold, easy bruising.  All other systems are negative.      PAST MEDICAL/SURGICAL HISTORY:  Metastatic intrahepatic cholangiocarcinoma as well as remote history of testicular seminoma, as described above.  Atrial fibrillation.  Left hip arthritis.  Hypertension.      HOME MEDICATIONS:  Lisinopril, atenolol, amlodipine, docusate, sennosides, vitamin D, multivitamin.      ALLERGIES:  Cefazolin, penicillins.      SOCIAL HISTORY:  The patient lives in [place], [place] with his wife.  He works as a , and previously served in [place] in the [employer].  He is a former smoker and denies significant alcohol use.      FAMILY HISTORY:  Remarkable for Stewart syndrome.  His father had colon cancer at age 50, his mother had ovarian cancer, and 2 sisters had colorectal cancers.      PHYSICAL EXAMINATION:  ECOG performance status is 4.  Pain scale:  0/10.   VITAL SIGNS:  Blood pressure 123/91, heart rate 96, temperature 98.1, respirations 14, oxygen saturation 98% on room air.   GENERAL:  The patient is alert and oriented, and is lying in a hospital bed in no acute distress.   HEAD:  Normocephalic and atraumatic.   ENT:  Mucous membranes are moist and without lesions.   NECK:  Supple and without palpable adenopathy.   HEART:  Regular rate and rhythm.   LUNGS:  Clear to auscultation.   ABDOMEN:   Soft and nondistended.   BACK:  No spine tenderness to deep palpation or percussion.   EXTREMITIES:  No edema in bilateral upper and lower extremities.   SKIN:  No rashes or lesions.   NEUROLOGIC:  Profound weakness in all extremities, with strength 1/5 in right upper and lower extremities and 2/5 in the left upper and lower extremities.  Sensation to light touch is intact throughout.      IMPRESSIONS AND RECOMMENDATIONS:  Mr. Rick Teixeira is a 71-year-old gentleman with progressive metastatic intrahepatic cholangiocarcinoma as described above, who presented to the emergency department last night due to relatively rapid onset of generalized significant weakness involving all 4 extremities.  Spine MRI reveals several lesions, most prominently in the lower cervical spine and upper thoracic spine with some areas of neural foraminal stenosis, but no central canal stenosis or signs of spinal cord compression throughout.  He has undergone neurologic workup, with cerebrospinal fluid showing elevated protein levels, potentially consistent with Guillain-Bergland syndrome.      The patient at the time of this dictation is reportedly undergoing treatment for potential Guillain-Bergland syndrome.  I agree that the clinical picture does not appear to be consistent with spinal cord compression or other neurologic compromise due to progressive metastatic disease.  This is for several reasons:  Spine MRI does not indicate any signs of neurologic compromise, his symptoms did not improve with IV steroids overnight, his absence of reflexes is not consistent with neurologic compromise, and his elevated CSF protein levels may not have a more compelling explanation.  I will remain available to consider radiotherapy if it becomes clear that his metastatic disease burden is symptomatic or otherwise requires treatment.      Thank you again for asking me to evaluate Mr. Teixeira.         SO BARBOZA MD           D: 01/18/2017 16:44   T:  2017 17:40   MT: LF      Name:     RODNEY MARIE   MRN:      [id number]        Account:       [id number]   :      1945           Consult Date:  2017      Document: [id number]       cc: Lei Gauthier MD        Consults by Mindy Lombardo MD at 2017  2:53 PM     Author:  Mindy Lombardo MD Service:  Hem/Onc Author Type:  Physician    Filed:  2017  4:39 PM Note Time:  2017  2:53 PM Status:  Signed    :  Mindy Lombardo MD (Physician)       Consult Orders:    1. Hematology & Oncology IP Consult: metastatic cholangiocarcinoma; Consultant may enter orders: Yes; Patient to be seen: Routine - within 24 hours; Requested Clinic/Group: [facility] [[id number]] ordered by Henry Pantoja DO at 17 1715                [facility] [facility] Consultation      Rodney Marie MRN# [id number]   YOB: 1945 Age: 71 year old   Date of Admission: 2017  Requesting physician: Henry Pantoja MD  Reason for consult: Metastatic cholangiocarcinoma.           Assessment and Plan:   71 year old male with metastatic intrahepatic cholangiocarcinoma presenting with sudden onset bilateral upper and lower extremity weakness.    1. Metastatic intrahepatic cholangiocarcinoma with pulmonary, hepatic, and skeletal metastases  - Patient had recent scans performed at [facility] that demonstrated disease progression after 7 cycles of 1st line therapy with gemcitabine and cisplatin.  Plan was to start 2nd line therapy with FOLFOX next week -- all chemo will be placed on hold until clinical improvement in his GBS/weakness.  Can consider a port placement prior to eventual hospital discharge.  Discussed with the patient.    2. Generalized weakness, sudden onset  -  Appreciate Neurology evaluation and recommendations. Discussed with Dr. Barriga and Dr. Lopez. 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  Appreciate Nephrology assistance to start plasmapheresis early tomorrow.    3. Anemia related to recent chemotherapy and cancer  - Hgb remains stable.  Continue serial CBC monitoring.    Full code    Thank you very much for the consult request.  Will follow with you.    Mindy Lombardo MD             Chief Complaint:   Generalized Weakness           History of Present Illness:   This patient is a 71 year old male with metastatic intrahepatic cholangiocarcinoma and the following oncologic history:  1.  10/5/2001: Left radical orchiectomy in [place] at [place].  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  Tumor markers prior to surgery showed AFP = 4.3, LDH = 161.  Received radiation.  2.  7/7/2016: Presented with a several week history of shortness of breath and cough, no hemoptysis.  Chest x-ray showed pulmonary nodules.  CT chest/abdomen/pelvis showed bilateral lung nodules, largest measuring 1.3 x 1 cm, bilateral pleural nodularity, largest measuring 1.3 x 0.5 cm, left hilar lymphadenopathy, mediastinal and right hilar lymphadenopathy, and an 8 cm heterogeneous mass in the right lobe of the liver with underlying cirrhosis.  3.  7/11/2016: Percutaneous biopsy of the liver mass showed poorly differentiated adenocarcinoma, CK 7 positive, CA 19-9 positive, negative for AFP with a background of cirrhosis, most consistent with cholangiocarcinoma.  4.  7/20/2016: Oncology consultation with Dr. Amalia Ribera and Dr. Lon Monteiro at the [facility].  7/18/2016 CBC normal, INR 1.2, alkaline phosphatase 153, ALT 72, ALT 39, albumin 3.9, total bilirubin 0.9, direct bilirubin 0.4, creatinine 0.9, AFP = 59, CA 19-9 = 5505.  5.  08/03/2016: Started first-line chemotherapy with gemcitabine and cisplatin given on days 1 and 8 every 21 days.  Cycle 1 day 8 cisplatin held due to increase in creatinine to 1.6.  6. 12/2016: After 7 cycles of gemcitabine and cisplatin, he developed disease  "progression based on CT scans at the [facility].  Therefore this treatment was discontinued as of 2016.     Elbert developed sudden onset bilateral upper and lower extremity weakness to the severity that he was unable to walk or transfer to car.  He had taken one Percocet prior to the onset of these symptoms but no other new medications.  He denied any associated fevers, chills, night sweats, pain, bowel or bladder dysfunction.  He then called 911 and was brought to [facility].  Extensive workup was undertaken including MRI cervical, thoracic, and lumbar spine which showed multiple skeletal metastases but no signs of spinal cord compression.  Brain MRI showed small areas of restricted diffusion in the cortex of the right frontal lobe consistent with small recent cortical infarcts and no brain metastases. Hgb was 10, WBC 3.7, and platelets 144,000, creatinine 1.17.  TTE showed LVEF 50-55%, normal RVSF; in atrial fibrillation. Lumbar puncture was performed and showed elevated total protein; cytology pending.             Physical Exam:   Vitals were reviewed  Blood pressure 119/87, pulse 84, temperature 98.1  F (36.7  C), temperature source Axillary, resp. rate 17, height 1.778 m (5' 10\"), weight 76.1 kg (167 lb 12.3 oz), SpO2 97 %.  Temperatures:  Current - Temp: 98.1  F (36.7  C); Max - Temp  Av.9  F (36.6  C)  Min: 97.4  F (36.3  C)  Max: 98.2  F (36.8  C)  Respiration range: Resp  Av.1  Min: 9  Max: 25  Pulse range: Pulse  Av  Min: 84  Max: 84  Blood pressure range: Systolic (24hrs), Av mmHg, Min:119 mmHg, Max:152 mmHg  ; Diastolic (24hrs), Av mmHg, Min:84 mmHg, Max:105 mmHg    Pulse oximetry range: SpO2  Av.1 %  Min: 90 %  Max: 98 %    Intake/Output Summary (Last 24 hours) at 17 7813  Last data filed at 17 1400   Gross per 24 hour   Intake    720 ml   Output    650 ml   Net     70 ml       GENERAL: No acute distress.  SKIN: No rashes or jaundice.  HEENT: Normocephalic, " atraumatic. Eyes anicteric. Oropharynx is clear.  LYMPH: No palpable lymphadenopathy in the cervical, supraclavicular, axillary regions.  HEART: Irregularly irregular rate and rhythm with no murmurs.  LUNGS: Clear bilaterally anteriorly.  ABDOMEN: Soft, nontender, nondistended with no palpable hepatosplenomegaly.  EXTREMITIES: No clubbing, cyanosis, or edema.  MENTAL: Alert and oriented to person, place, and time.  NEURO: Cranial nerves II through XII grossly intact with inability to raise his legs or arms to gravity; able to wiggle fingers and toes but  strength significantly reduced.            Past Medical History:   I have reviewed this patient's past medical history  Past Medical History   Diagnosis Date     Hypertension      Cancer (H)      liver and lung             Past Surgical History:   I have reviewed this patient's past surgical history  Past Surgical History   Procedure Laterality Date     Genitourinary surgery       testicular cancer               Social History:   I have reviewed this patient's social history  Social History   Substance Use Topics     Smoking status: Former Smoker     Smokeless tobacco: Not on file     Alcohol Use: No             Family History:   I have reviewed this patient's family history  No family history on file. Stewart syndrome, father with colon cancer, mother with ovarian cancer; 2 sisters with colon cancer.          Allergies:     Allergies   Allergen Reactions     Ancef [Cefazolin] Other (See Comments)     Chest heaviness     Penicillins Unknown             Medications:   I have reviewed this patient's current medications  Prescriptions prior to admission   Medication Sig Dispense Refill Last Dose     AMLODIPINE BESYLATE PO Take 5 mg by mouth daily    1/17/2017 at am     LISINOPRIL PO Take 10 mg by mouth daily    1/17/2017 at am     ATENOLOL PO Take 25 mg by mouth daily   1/17/2017 at am     DOCUSATE SODIUM PO Take by mouth daily   1/17/2017 at Unknown time      sennosides (SENOKOT) 8.6 MG tablet Take 1 tablet by mouth 2 times daily   1/17/2017 at am     VITAMIN D, CHOLECALCIFEROL, PO Take 400 Units by mouth daily   1/17/2017 at am     multivitamin, therapeutic (THERA-VIT) TABS tablet Take 1 tablet by mouth daily   1/17/2017 at am     PROCHLORPERAZINE MALEATE PO Take 10 mg by mouth daily    1/17/2017 at am     Current Facility-Administered Medications Ordered in Epic   Medication Dose Route Frequency Last Rate Last Dose     Medication Instruction   Does not apply Continuous PRN         labetalol (NORMODYNE/TRANDATE) injection 10-40 mg  10-40 mg Intravenous Q10 Min PRN         naloxone (NARCAN) injection 0.1-0.4 mg  0.1-0.4 mg Intravenous Q2 Min PRN         lidocaine 1 % 1 mL  1 mL Other Q1H PRN         lidocaine (LMX4) cream   Topical Q1H PRN         sodium chloride (PF) 0.9% PF flush 3 mL  3 mL Intracatheter Q1H PRN         sodium chloride (PF) 0.9% PF flush 3 mL  3 mL Intracatheter Q8H   3 mL at 01/18/17 1428     potassium chloride SA (K-DUR/KLOR-CON M) CR tablet 20-40 mEq  20-40 mEq Oral Q2H PRN   40 mEq at 01/17/17 2326     potassium chloride (KLOR-CON) Packet 20-40 mEq  20-40 mEq Oral or Feeding Tube Q2H PRN         potassium chloride 10 mEq in 100 mL intermittent infusion  10 mEq Intravenous Q1H PRN         potassium chloride 10 mEq in 100 mL intermittent infusion with 10 mg lidocaine  10 mEq Intravenous Q1H PRN         potassium chloride 20 mEq in 50 mL intermittent infusion  20 mEq Intravenous Q1H PRN         magnesium sulfate 4 g in 100 mL sterile water (premade)  4 g Intravenous Q4H PRN   4 g at 01/17/17 2347     acetaminophen (TYLENOL) tablet 650 mg  650 mg Oral Q4H PRN   650 mg at 01/17/17 2326     acetaminophen (TYLENOL) Suppository 650 mg  650 mg Rectal Q4H PRN         HYDROcodone-acetaminophen (NORCO) 5-325 MG per tablet 1-2 tablet  1-2 tablet Oral Q4H PRN         HYDROmorphone (PF) (DILAUDID) injection 0.3-0.5 mg  0.3-0.5 mg Intravenous Q2H PRN          senna-docusate (SENOKOT-S;PERICOLACE) 8.6-50 MG per tablet 1-2 tablet  1-2 tablet Oral BID PRN   1 tablet at 01/18/17 1109     polyethylene glycol (MIRALAX/GLYCOLAX) Packet 17 g  17 g Oral Daily PRN         bisacodyl (DULCOLAX) Suppository 10 mg  10 mg Rectal Daily PRN         ondansetron (ZOFRAN-ODT) ODT tab 4 mg  4 mg Oral Q6H PRN        Or     ondansetron (ZOFRAN) injection 4 mg  4 mg Intravenous Q6H PRN         sodium chloride (PF) 0.9% PF flush 3 mL  3 mL Intracatheter Q1H PRN   3 mL at 01/18/17 1034     docusate sodium (COLACE) capsule 100 mg  100 mg Oral Daily   100 mg at 01/18/17 1109     multivitamin, therapeutic (THERA-VIT) tablet 1 tablet  1 tablet Oral Daily   1 tablet at 01/18/17 1109     sennosides (SENOKOT) tablet 1 tablet  1 tablet Oral BID   1 tablet at 01/17/17 2301     cholecalciferol (vitamin D) tablet 400 Units  400 Units Oral Daily   400 Units at 01/18/17 1108     labetalol (NORMODYNE/TRANDATE) injection 10 mg  10 mg Intravenous Q2H PRN         hydrALAZINE (APRESOLINE) injection 10 mg  10 mg Intravenous Q4H PRN         No current [facility]-ordered outpatient prescriptions on file.             Review of Systems:   The 10 point Review of Systems is negative other than noted in the HPI.            Data:   All laboratory data reviewed  Results for orders placed or performed during the hospital encounter of 01/17/17 (from the past 24 hour(s))   MR Cervical Spine w/o Contrast    Narrative    MR CERVICAL SPINE W/O CONTRAST   1/17/2017 3:03 PM     HISTORY: bilateral UE/LE weakness    TECHNIQUE:  Multiplanar multisequence MRI of the cervical spine  without gadolinium contrast.     COMPARISON:  None.    FINDINGS: Motion artifact degrades quality of these images.  Infiltrative lesions are seen within the C7 and T2 and T3 vertebral  bodies. This is consistent with metastatic disease to these vertebrae.  There is abnormal soft tissue extending from the T2 vertebral body  into the right ventral epidural space and  right T2-T3 neural foramen.  This could affect the right T2 nerve root. There is moderate central  spinal stenosis at C6-7 due to bulging disc and associated  osteophytes. There is mild flattening of the cord at this level due to  the degenerative change.    C2-C3:  Normal disc, facet joints, spinal canal and neural foramina.    C3-C4:  Mild degenerative change in the facet joints.    C4-C5:  Mild annular bulge. Central canal still adequate. Moderate  foraminal stenosis due to uncinate spurs and loss of disc space  height.    C5-C6:  Loss of disc space height. Diffuse annular disc bulge. Central  canal mildly narrowed due to the bulging disc. Moderate bilateral  foraminal stenosis due to uncinate spurs.    C6-C7:  Degeneration of the disc. Diffuse annular disc bulge with  associated posterior osteophytes leading to moderate central stenosis  and mild flattening of the cord. There is also moderate bilateral  foraminal stenosis at this level due to loss of disc space height and  uncinate spurs.    C7-T1: Degeneration of the disc. Mild annular disc bulge. Moderate  bilateral foraminal stenosis due to uncinate spurs.      Impression    IMPRESSION:    1. Study is consistent with bone metastasis to C7, T2, and T3. There  is a small amount of epidural tumor at the right T2 level which also  extends into the right T2-3 neural foramen. This could affect the  right T2 nerve root. No central stenosis is seen at this level.  2. Multilevel degenerative change. The degenerative changes are  leading to moderate central stenosis at C6-7 with mild flattening of  the cord at this level.  3. There is also moderate foraminal stenosis at multiple levels due to  loss of disc space height and uncinate spurs.    KEILY GARCIA MD   MR Thoracic Spine w/o Contrast    Narrative    MR THORACIC SPINE W/O CONTRAST 1/17/2017 3:04 PM     HISTORY: limited movement of UE/LE    TECHNIQUE: Multiplanar multisequence MRI of the thoracic spine  without  gadolinium contrast.    COMPARISON: None.    FINDINGS: Inversion recovery images reveal infiltrative lesions within  the the C7, T2 and T12 vertebral bodies. There is also an infiltrative  process in the T9 lamina and spinous process. These are consistent  with metastatic disease. A very small amount of epidural tumor is seen  at the right T3 2 level causing only slight mass effect on the dural  sac. This is better seen on the MR scan of the cervical spine. There  are multilevel degenerative changes with loss of disc space height  throughout the thoracic spine. Mild bulging discs are seen at T4-5,  C5-6, and there is a small central disc protrusion at T6-7.      Impression    IMPRESSION:    1. Infiltrative lesions within the C7 and T2, and T12 vertebral bodies  and within the T9 lamina and spinous process. This is consistent with  metastatic disease.  2. Small amount of epidural tumor at the T2 level but no cord  compression or central stenosis.  3. Multilevel degenerative change.    KEILY GARCIA MD   MR Brain w/o Contrast    Narrative    MR BRAIN W/O CONTRAST 1/17/2017 3:04 PM     HISTORY: bilateral UE/LE weakness    TECHNIQUE: Multiplanar, multisequence MRI of the brain without IV  contrast material. No contrast was administered because of the length  of the examination. The patient was becoming uncomfortable.    COMPARISON: None.    FINDINGS: Motion artifact degrades quality of these images.  There is  generalized atrophy of the brain. . There are small areas of  restricted diffusion in the cortex of the right frontal lobe. These  measure about 4 mm in size. These would be compatible with recent  cortical infarcts. No lesions are seen in the cerebellum or left  hemisphere.. . The facial structures appear normal. The arteries at  the base of the brain and the dural venous sinuses appear patent. No  brain metastasis are identified. No mass lesions are seen.      Impression    IMPRESSION:   1. Small areas of  restricted diffusion in the cortex of the right  frontal lobe. These are consistent with small recent cortical  infarcts.  2. No brain metastasis are identified. No mass lesions are seen.  3. Generalized atrophy of the brain. .  4. No skull metastasis are identified.     KEILY GARCIA MD   MR Lumbar Spine w/o Contrast    Narrative    MR LUMBAR SPINE WITHOUT CONTRAST1/17/2017 2:59 PM      HISTORY: History of cancer, increasing weakness.    TECHNIQUE:  Multiplanar, multisequence MRI of the lumbar spine without  contrast.     COMPARISON: None.    FINDINGS:This report assumes five lumbar type vertebrae.     The conus and visualized portions of the thoracic spinal cord appear  normal. The paraspinal soft tissues appear normal as visualized. The  bone marrow has normal signal intensity. No bone metastasis are seen  in the lumbar region. No central spinal stenosis or cord compression.    L1-L2:   Normal disc, facet joints, spinal canal and neural foramina.    L2-L3:  Normal disc, facet joints, spinal canal and neural foramina.    L3-L4:  Degeneration of the disc. Diffuse annular disc bulge. There is  a small more right central disc herniation causing mild mass effect on  the dural sac. Central canal is mildly narrowed due to this right  central disc herniation. This herniation extends slightly inferior to  the level of the disc and posterior to the L4 vertebral body.    L4-L5:  Degeneration of the disks. Loss of disc space height. Diffuse  annular disc bulge. There is disc material extending into the right  and left neural foramen causing moderate bilateral foraminal stenosis.  Mild central stenosis due to the bulging disc and degenerative change  off the facet joints.    L5-S1:  Normal disc, facet joints, spinal canal and neural foramina.      Impression    IMPRESSION:   1. No bone metastasis are seen in the lumbar region.  2. Multilevel degenerative change.  3. Moderate size right central disc herniation at L3-L4 causing  mild  mass effect on the dural sac. This could affect the right L5 nerve  root as it arises from the sac. It is also causing mild central  stenosis.  4. Moderate bilateral L4-5 foraminal stenosis due to a bulging disc  and degenerative change off the facet joints. There is also moderate  central stenosis and lateral recess stenosis at this level due to  degenerative change off the facet joints and a bulging disc.    KEILY GARCIA MD   EKG 12 lead   Result Value Ref Range    Interpretation ECG Click View Image link to view waveform and result    Neurology IP Consult: Patient to be seen: Routine - within 24 hours; extremity weakness; Consultant may enter orders: Yes    Narrative    Patsy Gauthier MD     1/18/2017  7:34 AM  [facility]    Neurology Consultation     Date of Admission:  1/17/2017  Date of Consult (When I saw the patient): 01/17/2017    Assessment and Plan  Rick Teixeira is a 71 year old male who was admitted on   1/17/2017. I was asked to see the patient for generalized   weakness.      #. 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  #. 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    Patsy BARRERA  MD [place]    Code Status   No Order        Reason for Consult  Reason for consult: I was asked by Dr. Pantoja to evaluate this   patient for severe weakness.      Chief Complaint  Weak all over    History is obtained from the patient and electronic chart    History of Present Illness  Rick Teixeira is a 71 year old male with metastatic   cholangiocarinoma who presents with rapid/subacute onset of   weakness BUE and BLE over 2 days.  He has been unable to walk for   the past 2 days.  Notes weakness in the arms and legs.  Continued   chronic mid back pain, no other significant new pain, no muscle   pain.  He notes some numbness as well.  No bowel/bladder problems   but was unable to urinate in the ER today.  He denies any facial   weakness, no slurred speech, no problems breathing.  No recent   illness- no URI or diarrhea.  No recent fevers or feeling ill.  Last chemo was 2 weeks ago.  He has plans for starting new chemo   next week.    Past Medical History  I have reviewed this patient's medical history and updated it   with pertinent information if needed.   Past Medical History   Diagnosis Date     Hypertension      Cancer (H)      liver and lung       Past Surgical History  I have reviewed this patient's surgical history and updated it   with pertinent information if needed.  Past Surgical History   Procedure Laterality Date     Genitourinary surgery       testicular cancer       Prior to Admission Medications  Prior to Admission Medications   Prescriptions Last Dose Informant Patient Reported? Taking?   AMLODIPINE BESYLATE PO   Yes Yes   LISINOPRIL PO   Yes Yes      Facility-Administered Medications: None     Allergies  Allergies   Allergen Reactions     Ancef [Cefazolin] Other (See Comments)     Chest heaviness     Penicillins Unknown       Social History  I have reviewed this patient's social history and updated it with   pertinent information if needed. Rick Teixeira  reports that   he has  quit smoking. He does not have any smokeless tobacco   history on file. He reports that he does not drink alcohol or use   illicit drugs.    Family History  I have reviewed this patient's family history and updated it with   pertinent information if needed.   No significant neuro history.     Review of Systems  The 10 point Review of Systems is negative other than noted in   the HPI.    Physical Exam  Temp: 98.2  F (36.8  C) Temp src: Oral BP: (!) 133/101 mmHg   Pulse: 84   Resp: 14 SpO2: 94 % O2 Device: None (Room air)    Vital Signs with Ranges  Temp:  [98.2  F (36.8  C)] 98.2  F (36.8  C)  Pulse:  [] 84  Resp:  [14-18] 14  BP: (124-137)/() 133/101 mmHg  SpO2:  [94 %-100 %] 94 %  165 lbs 0 oz    General Appearance:  No acute distress  Neuro:       Mental Status Exam:  A,A, fully oriented- able to discuss in   detail his chemo treatments, symptoms.       Cranial Nerves: CN 2-12 examined and intact       Motor:  2/5 B prox arms, biceps, 3/5B wrist flexors and hand   , 2/5 B hip flexors, knee flexors, 3/5 B dorsi and plantar   flexion       Reflexes:  0 throughout, downgoing toes, no clonus       Sensory:  Decreased pinprick B arms, R foot up to above   knee, L foot up to below knee       Coordination:  Unable to assess- too weak       Gait:  Unable to assess- too weak  Neck: no nuchal rigidity. No carotid bruits.    Extremities: No clubbing, no cyanosis, no edema  CV: RRR nl s1, s2  Resp: CTAB        Data  Results for orders placed or performed during the hospital   encounter of 01/17/17 (from the past 24 hour(s))   Basic metabolic panel   Result Value Ref Range    Sodium 137 133 - 144 mmol/L    Potassium 3.3 (L) 3.4 - 5.3 mmol/L    Chloride 102 94 - 109 mmol/L    Carbon Dioxide 30 20 - 32 mmol/L    Anion Gap 5 3 - 14 mmol/L    Glucose 99 70 - 99 mg/dL    Urea Nitrogen 16 7 - 30 mg/dL    Creatinine 1.17 0.66 - 1.25 mg/dL    GFR Estimate 62 >60 mL/min/1.7m2    GFR Estimate If Black 75 >60 mL/min/1.7m2     Calcium 9.5 8.5 - 10.1 mg/dL   CBC (+ platelets, no diff)   Result Value Ref Range    WBC 3.7 (L) 4.0 - 11.0 10e9/L    RBC Count 2.87 (L) 4.4 - 5.9 10e12/L    Hemoglobin 10.0 (L) 13.3 - 17.7 g/dL    Hematocrit 30.0 (L) 40.0 - 53.0 %     (H) 78 - 100 fl    MCH 34.8 (H) 26.5 - 33.0 pg    MCHC 33.3 31.5 - 36.5 g/dL    RDW 16.8 (H) 10.0 - 15.0 %    Platelet Count 144 (L) 150 - 450 10e9/L   Magnesium   Result Value Ref Range    Magnesium 1.1 (L) 1.6 - 2.3 mg/dL   INR   Result Value Ref Range    INR 1.11 0.86 - 1.14   Hepatic panel   Result Value Ref Range    Bilirubin Direct 0.3 (H) 0.0 - 0.2 mg/dL    Bilirubin Total 0.8 0.2 - 1.3 mg/dL    Albumin 3.2 (L) 3.4 - 5.0 g/dL    Protein Total 7.2 6.8 - 8.8 g/dL    Alkaline Phosphatase 242 (H) 40 - 150 U/L    ALT 28 0 - 70 U/L    AST 51 (H) 0 - 45 U/L   MR Cervical Spine w/o Contrast    Narrative    MR CERVICAL SPINE W/O CONTRAST   1/17/2017 3:03 PM     HISTORY: bilateral UE/LE weakness    TECHNIQUE:  Multiplanar multisequence MRI of the cervical spine  without gadolinium contrast.     COMPARISON:  None.    FINDINGS: Motion artifact degrades quality of these images.  Infiltrative lesions are seen within the C7 and T2 and T3   vertebral  bodies. This is consistent with metastatic disease to these   vertebrae.  There is abnormal soft tissue extending from the T2 vertebral   body  into the right ventral epidural space and right T2-T3 neural   foramen.  This could affect the right T2 nerve root. There is moderate   central  spinal stenosis at C6-7 due to bulging disc and associated  osteophytes. There is mild flattening of the cord at this level   due to  the degenerative change.    C2-C3:  Normal disc, facet joints, spinal canal and neural   foramina.    C3-C4:  Mild degenerative change in the facet joints.    C4-C5:  Mild annular bulge. Central canal still adequate.   Moderate  foraminal stenosis due to uncinate spurs and loss of disc space  height.    C5-C6:  Loss of  disc space height. Diffuse annular disc bulge.   Central  canal mildly narrowed due to the bulging disc. Moderate bilateral  foraminal stenosis due to uncinate spurs.    C6-C7:  Degeneration of the disc. Diffuse annular disc bulge with  associated posterior osteophytes leading to moderate central   stenosis  and mild flattening of the cord. There is also moderate bilateral  foraminal stenosis at this level due to loss of disc space height   and  uncinate spurs.    C7-T1: Degeneration of the disc. Mild annular disc bulge.   Moderate  bilateral foraminal stenosis due to uncinate spurs.      Impression    IMPRESSION:    1. Study is consistent with bone metastasis to C7, T2, and T3.   There  is a small amount of epidural tumor at the right T2 level which   also  extends into the right T2-3 neural foramen. This could affect the  right T2 nerve root. No central stenosis is seen at this level.  2. Multilevel degenerative change. The degenerative changes are  leading to moderate central stenosis at C6-7 with mild flattening   of  the cord at this level.  3. There is also moderate foraminal stenosis at multiple levels   due to  loss of disc space height and uncinate spurs.    KEILY GARCIA MD   MR Thoracic Spine w/o Contrast    Narrative    MR THORACIC SPINE W/O CONTRAST 1/17/2017 3:04 PM     HISTORY: limited movement of UE/LE    TECHNIQUE: Multiplanar multisequence MRI of the thoracic spine   without  gadolinium contrast.    COMPARISON: None.    FINDINGS: Inversion recovery images reveal infiltrative lesions   within  the the C7, T2 and T12 vertebral bodies. There is also an   infiltrative  process in the T9 lamina and spinous process. These are   consistent  with metastatic disease. A very small amount of epidural tumor is   seen  at the right T3 2 level causing only slight mass effect on the   dural  sac. This is better seen on the MR scan of the cervical spine.   There  are multilevel degenerative changes with loss of disc  space   height  throughout the thoracic spine. Mild bulging discs are seen at   T4-5,  C5-6, and there is a small central disc protrusion at T6-7.      Impression    IMPRESSION:    1. Infiltrative lesions within the C7 and T2, and T12 vertebral   bodies  and within the T9 lamina and spinous process. This is consistent   with  metastatic disease.  2. Small amount of epidural tumor at the T2 level but no cord  compression or central stenosis.  3. Multilevel degenerative change.    KEILY GARCIA MD   MR Brain w/o Contrast    Narrative    MR BRAIN W/O CONTRAST 1/17/2017 3:04 PM     HISTORY: bilateral UE/LE weakness    TECHNIQUE: Multiplanar, multisequence MRI of the brain without IV  contrast material. No contrast was administered because of the   length  of the examination. The patient was becoming uncomfortable.    COMPARISON: None.    FINDINGS: Motion artifact degrades quality of these images.    There is  generalized atrophy of the brain. . There are small areas of  restricted diffusion in the cortex of the right frontal lobe.   These  measure about 4 mm in size. These would be compatible with recent  cortical infarcts. No lesions are seen in the cerebellum or left  hemisphere.. . The facial structures appear normal. The arteries   at  the base of the brain and the dural venous sinuses appear patent.   No  brain metastasis are identified. No mass lesions are seen.      Impression    IMPRESSION:   1. Small areas of restricted diffusion in the cortex of the right  frontal lobe. These are consistent with small recent cortical  infarcts.  2. No brain metastasis are identified. No mass lesions are seen.  3. Generalized atrophy of the brain. .  4. No skull metastasis are identified.     KEILY GARCIA MD   MR Lumbar Spine w/o Contrast    Narrative    MR LUMBAR SPINE WITHOUT CONTRAST1/17/2017 2:59 PM      HISTORY: History of cancer, increasing weakness.    TECHNIQUE:  Multiplanar, multisequence MRI of the lumbar spine    without  contrast.     COMPARISON: None.    FINDINGS:This report assumes five lumbar type vertebrae.     The conus and visualized portions of the thoracic spinal cord   appear  normal. The paraspinal soft tissues appear normal as visualized.   The  bone marrow has normal signal intensity. No bone metastasis are   seen  in the lumbar region. No central spinal stenosis or cord   compression.    L1-L2:   Normal disc, facet joints, spinal canal and neural   foramina.    L2-L3:  Normal disc, facet joints, spinal canal and neural   foramina.    L3-L4:  Degeneration of the disc. Diffuse annular disc bulge.   There is  a small more right central disc herniation causing mild mass   effect on  the dural sac. Central canal is mildly narrowed due to this right  central disc herniation. This herniation extends slightly   inferior to  the level of the disc and posterior to the L4 vertebral body.    L4-L5:  Degeneration of the disks. Loss of disc space height.   Diffuse  annular disc bulge. There is disc material extending into the   right  and left neural foramen causing moderate bilateral foraminal   stenosis.  Mild central stenosis due to the bulging disc and degenerative   change  off the facet joints.    L5-S1:  Normal disc, facet joints, spinal canal and neural   foramina.      Impression    IMPRESSION:   1. No bone metastasis are seen in the lumbar region.  2. Multilevel degenerative change.  3. Moderate size right central disc herniation at L3-L4 causing   mild  mass effect on the dural sac. This could affect the right L5   nerve  root as it arises from the sac. It is also causing mild central  stenosis.  4. Moderate bilateral L4-5 foraminal stenosis due to a bulging   disc  and degenerative change off the facet joints. There is also   moderate  central stenosis and lateral recess stenosis at this level due to  degenerative change off the facet joints and a bulging disc.    KEILY GARCIA MD   EKG 12 lead   Result Value Ref  Range    Interpretation ECG Click View Image link to view waveform and   result            Imaging and procedures:  I personally reviewed these images.  MRI brain: very small areas R cortical infarcts  MRI Cspine: bone mets C7, T2, T3; epidural tumor T2; moderate   central stenosis c6-7  MRI Tspine: as above mets and T2 tumor  MRI L spine: mod R L3-4 disk, no mets         MR Cervical Spine w Contrast    Narrative    MR CERVICAL SPINE WITH CONTRAST 1/17/2017 9:39 PM     HISTORY: Metastatic disease. Evaluation needed with contrast for  treatment purposes.    TECHNIQUE: Postcontrast images were obtained through the cervical  spine with 7 mL of Gadavist.    COMPARISON: MR cervical spine without contrast on same date.    FINDINGS: Postcontrast images show enhancement of the bone lesions in  the C7 and T2 vertebral bodies consistent with osseous metastases. In  addition, there is a small amount of enhancing epidural tumor at the  T2 level in the right anterolateral epidural space causing some mild  central canal stenosis but no cord deformity. There is also enhancing  soft tissue in the right C6-C7 and C7-T1 neural foramen as well as the  right T2-T3 neural foramen consistent with some tumor. There is also a  small amount of epidural tumor in the right anterior epidural space at  C7. There is no evidence for any intradural tumor any intramedullary  tumor. Degenerative changes are also superimposed most advanced at  C6-C7 where there is moderate central canal and bilateral neural  foraminal stenosis along with mild cord deformity. This is just above  the small amount of epidural tumor at C7.      Impression    IMPRESSION: Osseous metastases at C7 and T1 with some epidural tumor  in the right anterior lateral epidural spaces at these levels as well  as some epidural tumor in the right side of neural foramen at C6-C7,  C7-T1, and T2-T3. The epidural tumor is not causing any cord  impingement at this time. There is moderate  facet degenerative central  canal stenosis at C6-C7 with some mild cord deformity.    SO MALONE MD   EKG 12-lead, tracing only   Result Value Ref Range    Interpretation ECG Click View Image link to view waveform and result    Comprehensive metabolic panel   Result Value Ref Range    Sodium 137 133 - 144 mmol/L    Potassium 4.6 3.4 - 5.3 mmol/L    Chloride 103 94 - 109 mmol/L    Carbon Dioxide 25 20 - 32 mmol/L    Anion Gap 9 3 - 14 mmol/L    Glucose 185 (H) 70 - 99 mg/dL    Urea Nitrogen 21 7 - 30 mg/dL    Creatinine 1.16 0.66 - 1.25 mg/dL    GFR Estimate 62 >60 mL/min/1.7m2    GFR Estimate If Black 75 >60 mL/min/1.7m2    Calcium 9.1 8.5 - 10.1 mg/dL    Bilirubin Total 0.8 0.2 - 1.3 mg/dL    Albumin 3.1 (L) 3.4 - 5.0 g/dL    Protein Total 7.3 6.8 - 8.8 g/dL    Alkaline Phosphatase 243 (H) 40 - 150 U/L    ALT 33 0 - 70 U/L    AST 79 (H) 0 - 45 U/L   CBC with platelets   Result Value Ref Range    WBC 4.3 4.0 - 11.0 10e9/L    RBC Count 3.06 (L) 4.4 - 5.9 10e12/L    Hemoglobin 10.7 (L) 13.3 - 17.7 g/dL    Hematocrit 31.6 (L) 40.0 - 53.0 %     (H) 78 - 100 fl    MCH 35.0 (H) 26.5 - 33.0 pg    MCHC 33.9 31.5 - 36.5 g/dL    RDW 16.5 (H) 10.0 - 15.0 %    Platelet Count 155 150 - 450 10e9/L   Magnesium   Result Value Ref Range    Magnesium 2.5 (H) 1.6 - 2.3 mg/dL   Lipid panel reflex to direct LDL   Result Value Ref Range    Cholesterol 197 <200 mg/dL    Triglycerides 131 <150 mg/dL    HDL Cholesterol 54 >39 mg/dL    LDL Cholesterol Calculated 117 (H) <100 mg/dL    Non HDL Cholesterol 143 (H) <130 mg/dL   Hemoglobin A1c   Result Value Ref Range    Hemoglobin A1C 4.8 4.3 - 6.0 %   Troponin I   Result Value Ref Range    Troponin I ES 0.070 (H) 0.000 - 0.045 ug/L   TSH with free T4 reflex   Result Value Ref Range    TSH 0.81 0.40 - 4.00 mU/L   Cell count with differential CSF: Tube 4   Result Value Ref Range    WBC CSF 0 0 - 5 /uL    RBC CSF 21 (H) 0 - 2 /uL    Tube Number 4 #    Color CSF Colorless CLRL     Appearance CSF Clear CLER   Glucose CSF: Tube 1   Result Value Ref Range    Glucose CSF 69 40 - 70 mg/dL   Protein total CSF: Tube 1   Result Value Ref Range    Protein Total CSF 85 (H) 15 - 60 mg/dL   XR Lumbar Puncture Spinal Tap Diag    Narrative    EXAM: XR LUMBAR PUNCTURE SPINAL TAP DIAGNOSTIC  1/18/2017 9:43 AM       History:  Extremity weakness, rule out Guillain Bradley.     PROCEDURE: The patient consented for a lumbar puncture. The benefits  and risks of the procedure were explained to the patient, including  but not limited to worsening headache, hemorrhage, infection, lower  extremity pain, or nerve root injury. The patient was sterilely  prepped and draped with the patient in the supine position, over the  lower back. Under fluoroscopic guidance, the interlaminar spaces were  noted. 1% lidocaine was administered for local anesthetic over the  L2-3 interlaminar space, and a 22 gauge needle was advanced into the  thecal sac under fluoroscopic guidance. There was initial aspiration  of clear CSF. About 8 cc's total were aspirated.  The needle was  removed. There were no immediate complications associated with the  procedure.       Fluoro time: 0.2 minutes.   Number of Images: 2      Impression    IMPRESSION: Successful lumbar puncture.    JUAN NGUYEN PA-C                  Consults by Michael Lopez MD at 1/18/2017  4:21 PM     Author:  Michael Lopez MD Service:  Nephrology Author Type:  Physician    Filed:  1/18/2017  4:24 PM Note Time:  1/18/2017  4:21 PM Status:  Signed    :  Michael Lopez MD (Physician)       Consult Orders:    1. Nephrology IP Consult: Patient to be seen: Routine - within 24 hours; plasmaphoresis for gullian-barre; Consultant may enter orders: Yes [[id number]] ordered by Patsy Gauthier MD at 01/18/17 1525                See dictation.    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  Pt  consents to this treatment and placement of tunneled IJ dialysis catheter for apheresis access.  Will begin 1/19 AM.    Thanks.    Michael Lopez MD  Nephrology; [employer], [facility]  [phone]             Consults by Torres Vincent MD at 1/18/2017 10:52 AM     Author:  Torres Vincent MD Service:  Radiation Oncology Author Type:  Physician    Filed:  1/18/2017 11:01 AM Note Time:  1/18/2017 10:52 AM Status:  Signed    :  Torres Vincent MD (Physician)           RADIATION ONCOLOGY CONSULTATION    Full dictation to follow. Briefly, patient with metastatic cholangiocarcinoma, who presented with profound weakness in all 4 limbs. Spine MRI shows multiple sites concerning for metastases, most prominently in lower C-spine and upper T-spine, but no spinal cord compression, and weakness has not improved with IV steroids. He is also being worked up for other neurologic etiologies (e.g., Guillain-Lyle syndrome). Will continue to follow and discuss with his other providers the appropriate course of action.    Torers Vincent MD  Radiation Oncology       Consults by Patsy Gauthier MD at 1/17/2017  5:19 PM     Author:  Patsy Gauthier MD Service:  Neurology Author Type:  Physician    Filed:  1/18/2017  7:34 AM Note Time:  1/17/2017  5:19 PM Status:  Signed    :  Patsy Gauthier MD (Physician)       Consult Orders:    1. Neurology IP Consult: Patient to be seen: Routine - within 24 hours; extremity weakness; Consultant may enter orders: Yes [[id number]] ordered by Henry Pantoja DO at 01/17/17 1704                [facility]    Neurology Consultation     Date of Admission:  1/17/2017  Date of Consult (When I saw the patient): 01/17/2017    Assessment and Plan  Rick Teixeira is a 71 year old male who was admitted on 1/17/2017. I was asked to see the patient for generalized weakness.      #. Admit for stroke  workup  --Likely due to hypercoag from cancer  --MRI brain-done 2-3 small R cortical ischemic strokes  --vessel imaging: pending  --echo with bubble: pending  --tele monitoring  --labs: HA1C, lipids, TSH: pending  --likely needs chronic anticoagulation- will defer until full workup done  # Generalized weakness  --The small R sided strokes do not explain these symptoms  --decreased reflexes- unlikely cervical cord  --High in differential is GBS  --Also possible rapid onset other neuropathy or myopathy, cancer related but this was very quick onset for that  --LP and CSF eval  --NIF, FVC BID, more often if respiratory weakness or abnormal values found  #anemic- may add to weakness.  #. 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    Patsy Gauthier MD    Code Status   No Order        Reason for Consult  Reason for consult: I was asked by Dr. Pantoja to evaluate this patient for severe weakness.      Chief Complaint  Weak all over    History is obtained from the patient and electronic chart    History of Present Illness  Rick Teiexira is a 71 year old male with metastatic cholangiocarinoma who presents with rapid/subacute onset of weakness BUE and BLE over 2 days.  He has been unable to walk for the past 2 days.  Notes weakness in the arms and legs.  Continued chronic mid back pain, no other significant new pain, no muscle pain.  He notes some numbness as well.  No bowel/bladder problems but was unable to urinate in the ER today.  He denies any facial weakness, no slurred speech, no problems breathing.  No recent illness- no URI or diarrhea.  No recent fevers or feeling ill.  Last chemo was 2 weeks ago.  He has plans for starting new chemo next week.    Past Medical History  I have reviewed this patient's medical history and updated it with pertinent information if needed.   Past Medical  History   Diagnosis Date     Hypertension      Cancer (H)      liver and lung       Past Surgical History  I have reviewed this patient's surgical history and updated it with pertinent information if needed.  Past Surgical History   Procedure Laterality Date     Genitourinary surgery       testicular cancer       Prior to Admission Medications  Prior to Admission Medications   Prescriptions Last Dose Informant Patient Reported? Taking?   AMLODIPINE BESYLATE PO   Yes Yes   LISINOPRIL PO   Yes Yes      Facility-Administered Medications: None     Allergies  Allergies   Allergen Reactions     Ancef [Cefazolin] Other (See Comments)     Chest heaviness     Penicillins Unknown       Social History  I have reviewed this patient's social history and updated it with pertinent information if needed. Rick Teixeira  reports that he has quit smoking. He does not have any smokeless tobacco history on file. He reports that he does not drink alcohol or use illicit drugs.    Family History  I have reviewed this patient's family history and updated it with pertinent information if needed.   No significant neuro history.     Review of Systems  The 10 point Review of Systems is negative other than noted in the HPI.    Physical Exam  Temp: 98.2  F (36.8  C) Temp src: Oral BP: (!) 133/101 mmHg Pulse: 84   Resp: 14 SpO2: 94 % O2 Device: None (Room air)    Vital Signs with Ranges  Temp:  [98.2  F (36.8  C)] 98.2  F (36.8  C)  Pulse:  [] 84  Resp:  [14-18] 14  BP: (124-137)/() 133/101 mmHg  SpO2:  [94 %-100 %] 94 %  165 lbs 0 oz    General Appearance:  No acute distress  Neuro:       Mental Status Exam:  A,A, fully oriented- able to discuss in detail his chemo treatments, symptoms.       Cranial Nerves: CN 2-12 examined and intact       Motor:  2/5 B prox arms, biceps, 3/5B wrist flexors and hand , 2/5 B hip flexors, knee flexors, 3/5 B dorsi and plantar flexion       Reflexes:  0 throughout, downgoing toes, no clonus        Sensory:  Decreased pinprick B arms, R foot up to above knee, L foot up to below knee       Coordination:  Unable to assess- too weak       Gait:  Unable to assess- too weak  Neck: no nuchal rigidity. No carotid bruits.    Extremities: No clubbing, no cyanosis, no edema  CV: RRR nl s1, s2  Resp: CTAB        Data  Results for orders placed or performed during the hospital encounter of 01/17/17 (from the past 24 hour(s))   Basic metabolic panel   Result Value Ref Range    Sodium 137 133 - 144 mmol/L    Potassium 3.3 (L) 3.4 - 5.3 mmol/L    Chloride 102 94 - 109 mmol/L    Carbon Dioxide 30 20 - 32 mmol/L    Anion Gap 5 3 - 14 mmol/L    Glucose 99 70 - 99 mg/dL    Urea Nitrogen 16 7 - 30 mg/dL    Creatinine 1.17 0.66 - 1.25 mg/dL    GFR Estimate 62 >60 mL/min/1.7m2    GFR Estimate If Black 75 >60 mL/min/1.7m2    Calcium 9.5 8.5 - 10.1 mg/dL   CBC (+ platelets, no diff)   Result Value Ref Range    WBC 3.7 (L) 4.0 - 11.0 10e9/L    RBC Count 2.87 (L) 4.4 - 5.9 10e12/L    Hemoglobin 10.0 (L) 13.3 - 17.7 g/dL    Hematocrit 30.0 (L) 40.0 - 53.0 %     (H) 78 - 100 fl    MCH 34.8 (H) 26.5 - 33.0 pg    MCHC 33.3 31.5 - 36.5 g/dL    RDW 16.8 (H) 10.0 - 15.0 %    Platelet Count 144 (L) 150 - 450 10e9/L   Magnesium   Result Value Ref Range    Magnesium 1.1 (L) 1.6 - 2.3 mg/dL   INR   Result Value Ref Range    INR 1.11 0.86 - 1.14   Hepatic panel   Result Value Ref Range    Bilirubin Direct 0.3 (H) 0.0 - 0.2 mg/dL    Bilirubin Total 0.8 0.2 - 1.3 mg/dL    Albumin 3.2 (L) 3.4 - 5.0 g/dL    Protein Total 7.2 6.8 - 8.8 g/dL    Alkaline Phosphatase 242 (H) 40 - 150 U/L    ALT 28 0 - 70 U/L    AST 51 (H) 0 - 45 U/L   MR Cervical Spine w/o Contrast    Narrative    MR CERVICAL SPINE W/O CONTRAST   1/17/2017 3:03 PM     HISTORY: bilateral UE/LE weakness    TECHNIQUE:  Multiplanar multisequence MRI of the cervical spine  without gadolinium contrast.     COMPARISON:  None.    FINDINGS: Motion artifact degrades quality of these  images.  Infiltrative lesions are seen within the C7 and T2 and T3 vertebral  bodies. This is consistent with metastatic disease to these vertebrae.  There is abnormal soft tissue extending from the T2 vertebral body  into the right ventral epidural space and right T2-T3 neural foramen.  This could affect the right T2 nerve root. There is moderate central  spinal stenosis at C6-7 due to bulging disc and associated  osteophytes. There is mild flattening of the cord at this level due to  the degenerative change.    C2-C3:  Normal disc, facet joints, spinal canal and neural foramina.    C3-C4:  Mild degenerative change in the facet joints.    C4-C5:  Mild annular bulge. Central canal still adequate. Moderate  foraminal stenosis due to uncinate spurs and loss of disc space  height.    C5-C6:  Loss of disc space height. Diffuse annular disc bulge. Central  canal mildly narrowed due to the bulging disc. Moderate bilateral  foraminal stenosis due to uncinate spurs.    C6-C7:  Degeneration of the disc. Diffuse annular disc bulge with  associated posterior osteophytes leading to moderate central stenosis  and mild flattening of the cord. There is also moderate bilateral  foraminal stenosis at this level due to loss of disc space height and  uncinate spurs.    C7-T1: Degeneration of the disc. Mild annular disc bulge. Moderate  bilateral foraminal stenosis due to uncinate spurs.      Impression    IMPRESSION:    1. Study is consistent with bone metastasis to C7, T2, and T3. There  is a small amount of epidural tumor at the right T2 level which also  extends into the right T2-3 neural foramen. This could affect the  right T2 nerve root. No central stenosis is seen at this level.  2. Multilevel degenerative change. The degenerative changes are  leading to moderate central stenosis at C6-7 with mild flattening of  the cord at this level.  3. There is also moderate foraminal stenosis at multiple levels due to  loss of disc space  height and uncinate spurs.    KEILY GARCIA MD   MR Thoracic Spine w/o Contrast    Narrative    MR THORACIC SPINE W/O CONTRAST 1/17/2017 3:04 PM     HISTORY: limited movement of UE/LE    TECHNIQUE: Multiplanar multisequence MRI of the thoracic spine without  gadolinium contrast.    COMPARISON: None.    FINDINGS: Inversion recovery images reveal infiltrative lesions within  the the C7, T2 and T12 vertebral bodies. There is also an infiltrative  process in the T9 lamina and spinous process. These are consistent  with metastatic disease. A very small amount of epidural tumor is seen  at the right T3 2 level causing only slight mass effect on the dural  sac. This is better seen on the MR scan of the cervical spine. There  are multilevel degenerative changes with loss of disc space height  throughout the thoracic spine. Mild bulging discs are seen at T4-5,  C5-6, and there is a small central disc protrusion at T6-7.      Impression    IMPRESSION:    1. Infiltrative lesions within the C7 and T2, and T12 vertebral bodies  and within the T9 lamina and spinous process. This is consistent with  metastatic disease.  2. Small amount of epidural tumor at the T2 level but no cord  compression or central stenosis.  3. Multilevel degenerative change.    KEILY GARCIA MD   MR Brain w/o Contrast    Narrative    MR BRAIN W/O CONTRAST 1/17/2017 3:04 PM     HISTORY: bilateral UE/LE weakness    TECHNIQUE: Multiplanar, multisequence MRI of the brain without IV  contrast material. No contrast was administered because of the length  of the examination. The patient was becoming uncomfortable.    COMPARISON: None.    FINDINGS: Motion artifact degrades quality of these images.  There is  generalized atrophy of the brain. . There are small areas of  restricted diffusion in the cortex of the right frontal lobe. These  measure about 4 mm in size. These would be compatible with recent  cortical infarcts. No lesions are seen in the cerebellum or  left  hemisphere.. . The facial structures appear normal. The arteries at  the base of the brain and the dural venous sinuses appear patent. No  brain metastasis are identified. No mass lesions are seen.      Impression    IMPRESSION:   1. Small areas of restricted diffusion in the cortex of the right  frontal lobe. These are consistent with small recent cortical  infarcts.  2. No brain metastasis are identified. No mass lesions are seen.  3. Generalized atrophy of the brain. .  4. No skull metastasis are identified.     KEILY GARCIA MD   MR Lumbar Spine w/o Contrast    Narrative    MR LUMBAR SPINE WITHOUT CONTRAST1/17/2017 2:59 PM      HISTORY: History of cancer, increasing weakness.    TECHNIQUE:  Multiplanar, multisequence MRI of the lumbar spine without  contrast.     COMPARISON: None.    FINDINGS:This report assumes five lumbar type vertebrae.     The conus and visualized portions of the thoracic spinal cord appear  normal. The paraspinal soft tissues appear normal as visualized. The  bone marrow has normal signal intensity. No bone metastasis are seen  in the lumbar region. No central spinal stenosis or cord compression.    L1-L2:   Normal disc, facet joints, spinal canal and neural foramina.    L2-L3:  Normal disc, facet joints, spinal canal and neural foramina.    L3-L4:  Degeneration of the disc. Diffuse annular disc bulge. There is  a small more right central disc herniation causing mild mass effect on  the dural sac. Central canal is mildly narrowed due to this right  central disc herniation. This herniation extends slightly inferior to  the level of the disc and posterior to the L4 vertebral body.    L4-L5:  Degeneration of the disks. Loss of disc space height. Diffuse  annular disc bulge. There is disc material extending into the right  and left neural foramen causing moderate bilateral foraminal stenosis.  Mild central stenosis due to the bulging disc and degenerative change  off the facet  "joints.    L5-S1:  Normal disc, facet joints, spinal canal and neural foramina.      Impression    IMPRESSION:   1. No bone metastasis are seen in the lumbar region.  2. Multilevel degenerative change.  3. Moderate size right central disc herniation at L3-L4 causing mild  mass effect on the dural sac. This could affect the right L5 nerve  root as it arises from the sac. It is also causing mild central  stenosis.  4. Moderate bilateral L4-5 foraminal stenosis due to a bulging disc  and degenerative change off the facet joints. There is also moderate  central stenosis and lateral recess stenosis at this level due to  degenerative change off the facet joints and a bulging disc.    KEILY GARCIA MD   EKG 12 lead   Result Value Ref Range    Interpretation ECG Click View Image link to view waveform and result            Imaging and procedures:  I personally reviewed these images.  MRI brain: very small areas R cortical infarcts  MRI Cspine: bone mets C7, T2, T3; epidural tumor T2; moderate central stenosis c6-7  MRI Tspine: as above mets and T2 tumor  MRI L spine: mod R L3-4 disk, no mets             Consults by Lei Starks MD at 1/17/2017  5:27 PM     Author:  Lei Starks MD Service:  Neurosurgery Author Type:  Physician    Filed:  1/17/2017  5:43 PM Note Time:  1/17/2017  5:27 PM Status:  Signed    :  Lei Starks MD (Physician)           [facility]   Neurosurgery Consult Note         CC: Weakness    Primary care Provider: [facility]    Referring provider:   No referring provider defined for this encounter.      [place]: Rick Teixeira is a 71 year old male that presents to [facility] ER with a complaint of BUE and BLE weakness over the past 24 hours or so. He has a history of cholangiocarcinoma and lung cancer which he says is \"stage IV and he only has a few months to live\". He reports developing weakness in his arms and legs over the " last day or so. He feels the right side is weaker than his left in both his arms and legs. He has not ambulated in the last day or so. He has no radicular pain.      Past Medical History   Diagnosis Date     Hypertension      Cancer (H)      liver and lung       Past Surgical History   Procedure Laterality Date     Genitourinary surgery       testicular cancer       No current facility-administered medications for this encounter.     Current Outpatient Prescriptions   Medication     AMLODIPINE BESYLATE PO     LISINOPRIL PO       Allergies   Allergen Reactions     Ancef [Cefazolin] Other (See Comments)     Chest heaviness     Penicillins Unknown       Social History     Social History     Marital Status:      Spouse Name: N/A     Number of Children: N/A     Years of Education: N/A     Social History Main Topics     Smoking status: Former Smoker     Smokeless tobacco: None     Alcohol Use: No     Drug Use: No     Sexual Activity: Not Asked     Other Topics Concern     None     Social History Narrative     None       No family history on file.      Review Of Systems  Skin: negative  Eyes: negative  Ears/Nose/Throat: negative  Respiratory: No shortness of breath, dyspnea on exertion, cough, or hemoptysis  Cardiovascular: negative  Gastrointestinal: negative  Genitourinary: negative  Musculoskeletal: as above  Neurologic: as above  Psychiatric: negative  Hematologic/Lymphatic/Immunologic: negative  Endocrine: negative    B/P: 133/101, T: 98.2, P: 84, R: 14    Examination:  Awake  Alert  Oriented x 3  Speech clear  Cranial nerves II - XII intact  Face symmetric  Tongue midline  Neck nontender  Normal ROM of neck  Motor exam - 3/5 RUE, 2/5 LUE, 3/5 BLE, DF 4-/5 BLE, PF 4/5 BLE (? Effort)  Sensation with nondermatomal sensory loss  Clonus negative  DTR 1+ bilateral pattellar tendon  Williamson's sign negative  Spurling's sign negative  Ambulation no examined    Imaging:   MRI brain - small cortical restricted diffusion  in the right frontal lobe  MRI thoracic spine - T2, T9 and T12 metastatic lesions without canal stenosis. He has a very small amount of epidural tumor at T2 without cord compression  MRI lumbar spine - mild stenosis at L3-4 on the right secondary to disk bulge and bilateral moderate L4-5 stenosis  MRI cervical spine -  C7 metastatic disease in the vertebral bodies with C6-7 moderate stenosis without myelomalacia       Assessment/Plan:   I am not sure that the cervical stenosis could cause his symptoms, his stenosis at C6-7 is moderate at best and there is no clear myelomalacia. He informed me that he is not interested in surgical intervention since he is stage IV and was told he only has a few months to live. Also, I do not think surgery would resolve this weakness. I have recommended that he have IV steroids, a MRI cervical spine with contrast and Radiation Oncology consult. Will follow up in am.        Lei Starks MD, MS, FAANS  Neurosurgeon  [facility]  [address], [address]  [place] [address]  Tel [phone]                  Progress Notes - Physician (Notes from 01/18/17 through 01/21/17)      Progress Notes by Von Glez MD at 1/21/2017 12:58 PM     Author:  Von Glez MD Service:  Neurology Author Type:  Physician    Filed:  1/21/2017  1:03 PM Note Time:  1/21/2017 12:58 PM Status:  Signed    :  Von Glez MD (Physician)           Examination today with severe profound weakness all extremities. Nasal speech.Areflexic    Discussed with family- they have strong ties to [place] [facility] and have spoken to the neuro critical care people there who are willing to take in transfer. Given profound weakness and complication of metastatic cancer I think he would be well served in their care system.   Transfer in progress       Progress Notes by Michael Lopez MD at 1/21/2017 12:26 PM     Author:  Michael Lopez MD  Service:  Nephrology Author Type:  Physician    Filed:  1/21/2017 12:26 PM Note Time:  1/21/2017 12:26 PM Status:  Signed    :  Michael Lopez MD (Physician)           Note plan for transfer to [facility].  I will cancel plan for 1/22 PLEX.  Thanks.    Michael Lopez MD  Nephrology; [facility]  [phone]         Progress Notes by Juana Salcedo, RN at 1/21/2017 12:17 PM     Author:  Juana Salcedo RN Service:  Care Coordinator Author Type:      Filed:  1/21/2017 12:20 PM Note Time:  1/21/2017 12:17 PM Status:  Signed    :  Juana Salcedo, MARIA GUADALUPE ()           Spoke with SILKE Roepr from [facility], initial assessment given.  [phone].  Dr. Lopez, Dr. Glez, and Dr. Palencia all in agreement for transfer to [facility].  Need MD to MD discussion, Dr Brown notified. [phone].  Awaiting for [facility] to accept pt.        Progress Notes by AMANDA KESY at 1/20/2017  8:14 PM     Author:  AMANDA KEYS Service:  Respiratory Therapy Author Type:  Respiratory Therapist    Filed:  1/20/2017  8:15 PM Note Time:  1/20/2017  8:14 PM Status:  Signed    :  AMANDA KEYS, RT (Respiratory Therapist)           Patient was able to achieve a NIF -80 and VC 1700 ml with good effort.  Will cont to follow.  1/20/2017  Amanda Keys RRT       Progress Notes by Michael Lopez MD at 1/20/2017  5:44 PM     Author:  Michael Lopez MD Service:  Nephrology Author Type:  Physician    Filed:  1/20/2017  5:45 PM Note Time:  1/20/2017  5:44 PM Status:  Signed    :  Michael Lopez MD (Physician)           Stable 2nd plasma exchange today.  150% plasma volume treatment.  Replaced with Albumin 5%.    Michael Lopez MD  Nephrology; [facility]  [phone]         Progress Notes by Yanira Peck OT at 1/20/2017 10:24 AM     Author:  Yanira Peck OT Service:  (none) Author  Type:  Occupational Therapist    Filed:  1/20/2017  4:24 PM Note Time:  1/20/2017 10:24 AM Status:  Signed    :  Yanira Peck OT (Occupational Therapist)              01/20/17 1000   Quick Adds   Type of Visit Initial Occupational Therapy Evaluation   Living Environment   Lives With alone   Living Arrangements house   Living Environment Comment plans to discharge to  rehab   Self-Care   Equipment Currently Used at Home none   Functional Level Prior   Ambulation 0-->independent   Transferring 0-->independent   Toileting 0-->independent   Bathing 0-->independent   Dressing 0-->independent   Eating 0-->independent   Communication 0-->understands/communicates without difficulty   Swallowing 0-->swallows foods/liquids without difficulty   Cognition 0 - no cognition issues reported   Fall history within last six months no   Prior Functional Level Comment I at baseline       Present no   General Information   Onset of Illness/Injury or Date of Surgery - Date 01/17/17   Referring Physician Enrique Ahn MD   Patient/Family Goals Statement get stronger   Additional Occupational Profile Info/Pertinent History of Current Problem 71 year old male with metastatic intrahepatic cholangiocarcinoma presenting with sudden onset bilateral upper and lower extremity weakness. 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    Precautions/Limitations fall precautions   Cognitive Status Examination   Orientation orientation to person, place and time   Level of Consciousness alert   Able to Follow Commands WNL/WFL   Visual Perception   Visual Perception Wears glasses   Sensory Examination   Sensory Comments Intact to light touch through UE and LE   Pain Assessment   Patient Currently in Pain Yes, see Vital Sign flowsheet  (R shoulder)   Strength   Strength Comments demonstrates significant diffuse weakness; able to wiggle fingers (soft  with L hand), 2/5 wrist, 1/5 elbow and  "3/5 shoulder shrug.   Mobility   Bed Mobility Comments MAX A of 2   Transfer Skills   Transfer Comments Dependent on ceiling lift   Balance   Balance Comments unable to hold self up in sitting or standing   Upper Body Dressing   Level of [place]: Dress Upper Body dependent (less than 25% patients effort)   Lower Body Dressing   Level of [place]: Dress Lower Body dependent (less than 25% patients effort)   Toileting   Level of [place]: Toilet dependent (less than 25% patients effort)   Grooming   Level of [place]: Grooming dependent (less than 25% patients effort)   Eating/Self Feeding   Level of [place]: Eating dependent (less than 25% patients effort)   Activities of Daily Living Analysis   Impairments Contributing to Impaired Activities of Daily Living balance impaired;pain;strength decreased;postural control impaired   General Therapy Interventions   Planned Therapy Interventions ADL retraining;balance training;ROM;strengthening   Clinical Impression   Criteria for Skilled Therapeutic Interventions Met yes, treatment indicated   OT Diagnosis Decreased ADL I   Influenced by the following impairments decreased balance, weakness   Assessment of Occupational Performance 5 or more Performance Deficits   Identified Performance Deficits dressing, toileting, bathing, mobility, hygiene, home mgmt   Clinical Decision Making (Complexity) High complexity   Therapy Frequency daily   Predicted Duration of Therapy Intervention (days/wks) 5 days   Anticipated Discharge Disposition Acute Rehabilitation Facility   Risks and Benefits of Treatment have been explained. Yes   Patient, Family & other staff in agreement with plan of care Yes   [facility] [employer]-PAC TM \"6 Clicks\"   2016, Trustees of [facility], under license to [employer].  All rights reserved.   6 Clicks Short Forms Daily Activity Inpatient Short Form   [facility] AM-PAC  \"6 Clicks\" Daily Activity Inpatient Short Form   1. " Putting on and taking off regular lower body clothing? 1 - Total   2. Bathing (including washing, rinsing, drying)? 1 - Total   3. Toileting, which includes using toilet, bedpan or urinal? 1 - Total   4. Putting on and taking off regular upper body clothing? 1 - Total   5. Taking care of personal grooming such as brushing teeth? 1 - Total   6. Eating meals? 1 - Total   Daily Activity Raw Score (Score out of 24.Lower scores equate to lower levels of function) 6   Total Evaluation Time   Total Evaluation Time (Minutes) 15          Progress Notes by Anju Barber MD at 1/20/2017  9:02 AM     Author:  Anju Barber MD Service:  Neurology Author Type:  Physician    Filed:  1/20/2017  3:37 PM Note Time:  1/20/2017  9:02 AM Status:  Signed    :  Anju Barber MD (Physician)           [facility]  [facility] [place]  Neurology Daily Note      Admission Date:1/17/2017   Date of service: 01/20/2017   Hospital Day: 4                                                   Assessment and Plan:   #. 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  His situation is complex as he has other medical condition, metastatic cholangiocarcinoma raising high suspicion that this is paraneoplastic syndrome.  CSF protein is high which can sometimes be observed in leptomeningeal involvement of cancers and sometimes cytology is not always sensitive.  --Continue PLEX  Due to his profound weakness, he will be at risk of respiratory, swallowing issues, autonomic instability.  He will need close clinical monitoring and transferred to ICU if appropriate.  I discussed the rather guarded prognosis regarding his condition and the possibility of need for intubation. He is quite sure that he would want to be aggressive with intubation and ventilation.  Plan to review EMG  results-later result c/w AIDP.  Continue respiratory monitoring-currently stable  Continue supportive measures.  #Small infarct Right frontal.  Assumed to be related to hypercoagulable state related to underlying cancer  Per Dr. Radha Barriga plan, consider pics abandoned once he completesPLEX  #cervical canal stenosis  Note neurosurgical recommendations-agree that presentation is more consistent with peripheral nerve process.  Plans for surgery were deferred  #. PT/OT/Speech  --continue evaluations  #.Nutrition  --Per speech therapy evaluation    Dr. Glez/Radha Barriga to follow for the weekend       Interval History:   Chart reviewed-very complicated situation.  Dramatic progression of weakness over the last week.  Sensory loss.  Denies pain.  Denies any swallowing difficulty.  Denies shortness of breath.        Review of Systems:   Denies any change in urine or bowel control.  Denies visual difficulty.        Medications:   Scheduled Meds:    enoxaparin  40 mg Subcutaneous Q24H     mirtazapine  15 mg Oral At Bedtime     sodium chloride (PF)  3 mL Intracatheter Q8H     docusate sodium  100 mg Oral Daily     multivitamin, therapeutic  1 tablet Oral Daily     sennosides  1 tablet Oral BID     cholecalciferol  400 Units Oral Daily     PRN Meds: sodium chloride (PF), heparin, sodium chloride 0.9%, - MEDICATION INSTRUCTIONS -, labetalol, metoprolol, naloxone, lidocaine, lidocaine 4%, sodium chloride (PF), potassium chloride SA, potassium chloride, potassium chloride, potassium chloride with lidocaine, potassium chloride, magnesium sulfate, acetaminophen, acetaminophen, HYDROcodone-acetaminophen, HYDROmorphone, senna-docusate, polyethylene glycol, bisacodyl, ondansetron **OR** ondansetron, sodium chloride (PF), labetalol, hydrALAZINE        Physical Exam:   Vitals: Temp: 97.7  F (36.5  C) Temp src: Oral BP: 123/87 mmHg Pulse: 87 Heart Rate: 112 Resp: 11 SpO2: 96 % O2 Device: None (Room air)    Vital Signs with  Ranges: Temp:  [97.3  F (36.3  C)-98.2  F (36.8  C)] 97.7  F (36.5  C)  Pulse:  [83-98] 87  Heart Rate:  [] 112  Resp:  [10-19] 11  BP: ()/() 123/87 mmHg  SpO2:  [95 %-99 %] 96 %    General Appearance:  No acute distress  Neuro:       Mental Status Exam:   Alert and oriented.  Language and speech intact.       Cranial Nerves:   Vision/visual fields intact to finger counting.  Pupils are equal and reactive to light, extraocular movements intact.  Facial strength might be slightly weak.  Minimal facial asymmetry.  No masseter or tongue deviation weak shoulder elevation.  Palate elevation intact.           Motor:   Flaccid tone in all four extremities.  No  or movement of the right arm, minimal  on the left side.  No ability to lift his legs only slight internal/external rotation of the legs.  Some atrophy           Reflexes:  Areflexic, toe signs neutral       Sensory:  No pinprick sensation below the shoulder level.  Vibration sense is intact at the shoulder, not present at the elbow hands knees or feet.                   Coordination:   Consistent with profound weakness       Gait:  Unable    Cardiovascular: Regular rate and rhythm, no m/r/g    Extremities: No clubbing, no cyanosis, no edema       Data:   ROUTINE IP LABS (Last 3results)  CBC RESULTS:     Recent Labs   Lab Test  01/20/17   0540  01/19/17   0715  01/18/17   0510   WBC  5.9  5.6  4.3   RBC  3.16*  2.91*  3.06*   HGB  11.0*  10.0*  10.7*   HCT  32.9*  30.3*  31.6*   PLT  112*  135*  155     Basic Metabolic Panel:  Recent Labs   Lab Test  01/20/17   0540  01/19/17   0715  01/18/17   0510   NA  139  138  137   POTASSIUM  4.6  3.9  4.6   CHLORIDE  109  105  103   CO2  20  25  25   BUN  23  25  21   CR  1.13  1.15  1.16   GLC  104*  112*  185*   SERINA  8.2*  8.6  9.1     Liver panel:  Recent Labs   Lab Test  01/19/17   0715  01/18/17   0510  01/17/17   1228   PROTTOTAL  6.7*  7.3  7.2   ALBUMIN  2.9*  3.1*  3.2*   BILITOTAL  0.5  0.8   0.8   ALKPHOS  237*  243*  242*   AST  84*  79*  51*   ALT  45  33  28     INR:  Recent Labs   Lab Test  01/19/17   0715  01/17/17   1228   INR  1.15*  1.11      Lipid Profile:  Recent Labs   Lab Test  01/18/17   0510   CHOL  197   HDL  54   LDL  117*   TRIG  131     Thyroid Panel:  Recent Labs   Lab Test  01/18/17   0510   TSH  0.81      Vitamin B12:   Recent Labs   Lab Test  01/18/17   1858   B12  >6000*      Vitamin D level:   Recent Labs   Lab Test  01/18/17   0510   VITDT  82*     A1C:   Recent Labs   Lab Test  01/18/17   0510   A1C  4.8     Troponin I:   Recent Labs   Lab Test  01/18/17   0510   TROPI  0.070*     CSF results reviewed     IMAGING:   All imaging studies were reviewed personally  MR BRAIN W/O CONTRAST 1/17/2017 3:04 PM       HISTORY: bilateral UE/LE weakness     TECHNIQUE: Multiplanar, multisequence MRI of the brain without IV  contrast material. No contrast was administered because of the length  of the examination. The patient was becoming uncomfortable.     COMPARISON: None.     FINDINGS: Motion artifact degrades quality of these images.  There is  generalized atrophy of the brain. . There are small areas of  restricted diffusion in the cortex of the right frontal lobe. These  measure about 4 mm in size. These would be compatible with recent  cortical infarcts. No lesions are seen in the cerebellum or left  hemisphere.. . The facial structures appear normal. The arteries at  the base of the brain and the dural venous sinuses appear patent. No  brain metastasis are identified. No mass lesions are seen.                                                                       IMPRESSION:    1. Small areas of restricted diffusion in the cortex of the right  frontal lobe. These are consistent with small recent cortical  infarcts.  2. No brain metastasis are identified. No mass lesions are seen.  3. Generalized atrophy of the brain. .  4. No skull metastasis are identified    MR CERVICAL SPINE WITH  CONTRAST 1/17/2017 9:39 PM       HISTORY: Metastatic disease. Evaluation needed with contrast for  treatment purposes.     TECHNIQUE: Postcontrast images were obtained through the cervical  spine with 7 mL of Gadavist.     COMPARISON: MR cervical spine without contrast on same date.     FINDINGS: Postcontrast images show enhancement of the bone lesions in  the C7 and T2 vertebral bodies consistent with osseous metastases. In  addition, there is a small amount of enhancing epidural tumor at the  T2 level in the right anterolateral epidural space causing some mild  central canal stenosis but no cord deformity. There is also enhancing  soft tissue in the right C6-C7 and C7-T1 neural foramen as well as the  right T2-T3 neural foramen consistent with some tumor. There is also a  small amount of epidural tumor in the right anterior epidural space at  C7. There is no evidence for any intradural tumor any intramedullary  tumor. Degenerative changes are also superimposed most advanced at  C6-C7 where there is moderate central canal and bilateral neural  foraminal stenosis along with mild cord deformity. This is just above  the small amount of epidural tumor at C7.                                                                       IMPRESSION: Osseous metastases at C7 and T1 with some epidural tumor  in the right anterior lateral epidural spaces at these levels as well  as some epidural tumor in the right side of neural foramen at C6-C7,  C7-T1, and T2-T3. The epidural tumor is not causing any cord  impingement at this time. There is moderate facet degenerative central  canal stenosis at C6-C7 with some mild cord deformity    MR LUMBAR SPINE WITHOUT CONTRAST1/17/2017 2:59 PM        HISTORY: History of cancer, increasing weakness.     TECHNIQUE:  Multiplanar, multisequence MRI of the lumbar spine without  contrast.       COMPARISON: None.     FINDINGS:This report assumes five lumbar type vertebrae.       The conus and  visualized portions of the thoracic spinal cord appear  normal. The paraspinal soft tissues appear normal as visualized. The  bone marrow has normal signal intensity. No bone metastasis are seen  in the lumbar region. No central spinal stenosis or cord compression.     L1-L2:   Normal disc, facet joints, spinal canal and neural foramina.     L2-L3:  Normal disc, facet joints, spinal canal and neural foramina.     L3-L4:  Degeneration of the disc. Diffuse annular disc bulge. There is  a small more right central disc herniation causing mild mass effect on  the dural sac. Central canal is mildly narrowed due to this right  central disc herniation. This herniation extends slightly inferior to  the level of the disc and posterior to the L4 vertebral body.     L4-L5:  Degeneration of the disks. Loss of disc space height. Diffuse  annular disc bulge. There is disc material extending into the right  and left neural foramen causing moderate bilateral foraminal stenosis.  Mild central stenosis due to the bulging disc and degenerative change  off the facet joints.     L5-S1:  Normal disc, facet joints, spinal canal and neural foramina.                                                                       IMPRESSION:    1. No bone metastasis are seen in the lumbar region.  2. Multilevel degenerative change.  3. Moderate size right central disc herniation at L3-L4 causing mild  mass effect on the dural sac. This could affect the right L5 nerve  root as it arises from the sac. It is also causing mild central  stenosis.  4. Moderate bilateral L4-5 foraminal stenosis due to a bulging disc  and degenerative change off the facet joints. There is also moderate  central stenosis and lateral recess stenosis at this level due to  degenerative change off the facet joints and a bulging disc    MR LUMBAR SPINE WITHOUT CONTRAST1/17/2017 2:59 PM        HISTORY: History of cancer, increasing weakness.     TECHNIQUE:  Multiplanar, multisequence  MRI of the lumbar spine without  contrast.       COMPARISON: None.     FINDINGS:This report assumes five lumbar type vertebrae.       The conus and visualized portions of the thoracic spinal cord appear  normal. The paraspinal soft tissues appear normal as visualized. The  bone marrow has normal signal intensity. No bone metastasis are seen  in the lumbar region. No central spinal stenosis or cord compression.     L1-L2:   Normal disc, facet joints, spinal canal and neural foramina.     L2-L3:  Normal disc, facet joints, spinal canal and neural foramina.     L3-L4:  Degeneration of the disc. Diffuse annular disc bulge. There is  a small more right central disc herniation causing mild mass effect on  the dural sac. Central canal is mildly narrowed due to this right  central disc herniation. This herniation extends slightly inferior to  the level of the disc and posterior to the L4 vertebral body.     L4-L5:  Degeneration of the disks. Loss of disc space height. Diffuse  annular disc bulge. There is disc material extending into the right  and left neural foramen causing moderate bilateral foraminal stenosis.  Mild central stenosis due to the bulging disc and degenerative change  off the facet joints.     L5-S1:  Normal disc, facet joints, spinal canal and neural foramina.                                                                       IMPRESSION:    1. No bone metastasis are seen in the lumbar region.  2. Multilevel degenerative change.  3. Moderate size right central disc herniation at L3-L4 causing mild  mass effect on the dural sac. This could affect the right L5 nerve  root as it arises from the sac. It is also causing mild central  stenosis.  4. Moderate bilateral L4-5 foraminal stenosis due to a bulging disc  and degenerative change off the facet joints. There is also moderate  central stenosis and lateral recess stenosis at this level due to  degenerative change off the facet joints and a bulging  disc    CT ANGIOGRAM OF THE HEAD AND NECK WITHOUT AND WITH CONTRAST  1/18/2017  6:08 PM       HISTORY: Admitted yesterday for generalized weakness. Evaluate for  stroke. Small infarcts in the cortex of the right frontal lobe on  yesterday's MRI. Known osseous metastases and C7-T1 epidural tumor.     TECHNIQUE:  Precontrast localizing scans were followed by CT  angiography with an injection of 70 mL Isovue-370 IV with scans  through the head and neck.  Images were transferred to a separate 3-D  workstation where multiplanar reformations and 3-D images were  created.  Estimates of carotid stenoses are made relative to the  distal internal carotid artery diameters except as noted. Radiation  dose for this scan was reduced using automated exposure control,  adjustment of the mA and/or kV according to patient size, or iterative  reconstruction technique.     COMPARISON: MRI yesterday.     CT HEAD FINDINGS:  No contrast enhancing lesions.  Cerebral blood flow  is grossly normal.     CT ANGIOGRAM HEAD FINDINGS:  Arteries are widely patent with no  aneurysm, significant stenosis, occlusion or intraarterial thrombus.  Venous circulation is unremarkable.     CT ANGIOGRAM NECK FINDINGS:    Right carotid artery: No significant stenosis.        Left carotid artery: Minimal calcified plaque.  No significant  stenosis.        Vertebral arteries: No significant stenosis.        Other findings: There is a dominant poorly circumscribed nodule in the  right upper lobe about 2 cm diameter. There are also numerous much  smaller peripheral nodules in the upper lobes. The appearance is  suggestive of metastatic disease. Destructive lesion is seen in the C7  vertebral body.                                                                       IMPRESSION:    1. Minimal plaque in the proximal left internal carotid artery.  2. Otherwise normal CT angiogram of the head and neck.  3. Bone and lung metastases       Progress Notes by Ayaan Mishra  "MD Moses at 2017  2:49 PM     Author:  Ayaan Mishra MD Service:  Oncology Author Type:  Physician    Filed:  2017  2:51 PM Note Time:  2017  2:49 PM Status:  Signed    :  Ayaan Mishra MD (Physician)           [facility] Progress Note          Assessment and Plan:   71 year old male with metastatic intrahepatic cholangiocarcinoma presenting with sudden onset bilateral upper and lower extremity weakness.    1. Metastatic intrahepatic cholangiocarcinoma with pulmonary, hepatic, and skeletal metastases  - Patient had recent scans performed at [facility] that demonstrated disease progression after 7 cycles of 1st line therapy with gemcitabine and cisplatin.  Plan was to start 2nd line therapy with FOLFOX next week -- all chemo will be placed on hold until clinical improvement in his GBS/weakness.  Can consider a port placement prior to eventual hospital discharge.      2. Generalized weakness, sudden onset  -  Appreciate Neurology evaluation and recommendations. Discussed with Dr. Barriga and Dr. Lopez. 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  Appreciate Nephrology assistance -- Received PLEX on 2017 and 2017.    3. Anemia related to recent chemotherapy and cancer  - Hgb remains stable.  Continue serial CBC monitoring.    4. Insomnia  - Continue mirtazipine to help with sleep as well as appetite and mood.    Full code               Interval History:   Denied new complaints.  Denied fevers.  Pain well controlled              Review of Systems:   As per subjective, otherwise 5 systems reviewed and negative.           Physical Exam:   Blood pressure 142/98, pulse 108, temperature 97.2  F (36.2  C), temperature source Oral, resp. rate 9, height 1.778 m (5' 10\"), weight 77.3 kg (170 lb 6.7 oz), SpO2 97 %.      Vital Sign Ranges  Temperature Temp  Av.8  F (36.6  C)  Min: 97.4  F (36.3  C)  Max: 98.1  F (36.7  C)   Blood " pressure Systolic (24hrs), Av mmHg, Min:119 mmHg, Max:147 mmHg       Diastolic (24hrs), Av mmHg, Min:85 mmHg, Max:105 mmHg      Pulse No Data Recorded   Respirations Resp  Av.8  Min: 9  Max: 25   Pulse oximetry SpO2  Av.9 %  Min: 90 %  Max: 99 %         Intake/Output Summary (Last 24 hours) at 17 0940  Last data filed at 17 0511   Gross per 24 hour   Intake    795 ml   Output   1150 ml   Net   -355 ml       Constitutional:   No acute distress.   Extremities:   No clubbing, cyanosis, or edema.   Neurological:   No change in strength in the upper and lower extremities.            Medications:     No current outpatient prescriptions on file.                Data:     Results for orders placed or performed during the hospital encounter of 17 (from the past 24 hour(s))   Basic metabolic panel   Result Value Ref Range    Sodium 139 133 - 144 mmol/L    Potassium 4.6 3.4 - 5.3 mmol/L    Chloride 109 94 - 109 mmol/L    Carbon Dioxide 20 20 - 32 mmol/L    Anion Gap 10 3 - 14 mmol/L    Glucose 104 (H) 70 - 99 mg/dL    Urea Nitrogen 23 7 - 30 mg/dL    Creatinine 1.13 0.66 - 1.25 mg/dL    GFR Estimate 64 >60 mL/min/1.7m2    GFR Estimate If Black 78 >60 mL/min/1.7m2    Calcium 8.2 (L) 8.5 - 10.1 mg/dL   CBC with platelets   Result Value Ref Range    WBC 5.9 4.0 - 11.0 10e9/L    RBC Count 3.16 (L) 4.4 - 5.9 10e12/L    Hemoglobin 11.0 (L) 13.3 - 17.7 g/dL    Hematocrit 32.9 (L) 40.0 - 53.0 %     (H) 78 - 100 fl    MCH 34.8 (H) 26.5 - 33.0 pg    MCHC 33.4 31.5 - 36.5 g/dL    RDW 16.7 (H) 10.0 - 15.0 %    Platelet Count 112 (L) 150 - 450 10e9/L   Magnesium   Result Value Ref Range    Magnesium 1.4 (L) 1.6 - 2.3 mg/dL   Phosphorus   Result Value Ref Range    Phosphorus 2.8 2.5 - 4.5 mg/dL                    Progress Notes by Ti Russell RN at 2017  2:30 PM     Author:  Ti Russell RN Service:  (none) Author Type:  Registered Nurse    Filed:  2017  2:30 PM Note Time:   1/20/2017  2:30 PM Status:  Signed    :  Ti Russell, RN (Registered Nurse)           Apheresis - Plasma Exchange    Rick Teixeira was run in room 305 for a 1.5 volume apheresis treatment using the Optia apheresis machine. Consent verified.     Run number - 2 of 5   Height - 178 cm  Weight - 77 kg  HCT - 33%  Plasma Volume and type - 5000 ml 5% albumin  Calcium Replacement - 6 gm Ca Gluc IV  Inlet speed - 60 ml/min  Fluid Balance - 100%  ACD-A ratio - 13:1  Access - R tunneled PCAD  Premeds - None    Treatment notes - A&O, VSS. Unable to move upper or lower extremities r/t GBS. C/O being too warm and also nausea about 30 minutes into tx. Gave 4 mg zofran IV and secured the blood warmer. Continued tx without issue.     Seen by Dr. Lopez. VSS at end of tx. Report given to Sarah Freitas RN.     Next treatment - Sunday, January 22 @ 0800.    HORACIO Powers, RN  [facility]  [facility]         Progress Notes by Chico Brown MD at 1/20/2017  9:48 AM     Author:  Chico Brown MD Service:  Hospitalist Author Type:  Physician    Filed:  1/20/2017 11:45 AM Note Time:  1/20/2017  9:48 AM Status:  Signed    :  Chico Brown MD (Physician)           [facility]    Hospitalist Progress Note    Date of Service (when I saw the patient): 01/20/2017    Assessment and Plan  Rick Teixeira is a 71 year old male who was admitted on 1/17/2017.  Past medical history including metastatic cholangiocarcinoma presenting with acute lower extremity weakness.        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  MRI brain with recent infarcts, however, location would not explain his diffuse weakness.  MRI spine (cervical, thoracic and lumbar) with metastatic lesions at C7, T2 and T12 but negative for cord impingement.  Underwent LP 1/18 with finding of elevated protein level with  normal WBC.  Per Neuro, EMG 1/18 also abnormal concerning for Guillain-[place].  Nephrology consulted, tunneled cath placed 1/19 by IR.  - initiating plasma exchange 1/19 (plan for 5-7 treatments)   - Neph and Neuro assistance much appreciated    Metastatic cholangiocarcinoma:  Follows with [facility].  MRI demonstrating metastatic disease as noted.  Radiation Oncology consulted, radiation treatment to be determined.  MOHPA consulted, was to start new FOLFOX regimen next week but further chemo on hold until functional status improved.  - may consider port placement prior to discharge (would pursue before initiating anticoagulation)  - Onc and Rad Onc recs appreciated    Small recent cortical brain infarcts: MRI of the brain showed small areas of restricted diffusion in the cortex of the right frontal lobe.  CT angio 1/18 without occlusion or stenosis.  No brain metastases identified.  Likely embolic in setting of A-fib.  TTE with EF 50-55%, negative bubble.  TSH, A1C wnl.  LDL elevated at 117.  - Neuro recommends anticoagulation with apixaban once plasma exchange complete, will also need statin  - permissive HTN  - SLP recommends regular diet    Paroxysmal A-fib:  Family reports history of A-fib diagnosed at prior hospitalization.  Decision was made against anticoagulation due to limited life expectancy despite hypercoagulable state of malignancy.  - continue tele, currently rate controlled without kelsie blocking agents  - metoprolol IV prn  - ultimately start apixaban once pheresis complete    Hypertension: Holding PTA atenolol and amlodipine for permissive HTN.    - resume BB and CCB as indicated  - lisinopril needs to be held until plasmapheresis complete    Hypokalemia: Patient with low potassium and magnesium on admission.  - electrolyte protocol    Anemia, macrocytic, thrombocytopenia, pancytopenia:  Presume secondary to chemotherapy.  MCV is 105.  White blood cell count and platelet count are also reduced.   Hemoglobin is 10.0 on admission.  - cell counts stable, monitor  Right posterior Shoulder pain: try lidocain patch    DVT Prophylaxis: Pneumatic Compression Devices, start lovenox  Code Status: Full Code    Disposition: Expected discharge pending clinical improvement, completion of pheresis.    Chico Katelyn Brown    Interval History  Patient seen and assessed. Report feeling slightly better. Able to move his finger and toes. He report pain of the right shoulder.    -Data reviewed today: I reviewed all new labs and imaging results over the last 24 hours. I personally reviewed no images or EKG's today.    Physical Exam  Temp: 97.7  F (36.5  C) Temp src: Oral BP: 123/87 mmHg Pulse: 87 Heart Rate: 112 Resp: 11 SpO2: 96 % O2 Device: None (Room air)    Filed Vitals:    01/18/17 0430 01/19/17 0645 01/19/17 0926   Weight: 76.1 kg (167 lb 12.3 oz) 77.3 kg (170 lb 6.7 oz) 77.3 kg (170 lb 6.7 oz)     Vital Signs with Ranges  Temp:  [97.3  F (36.3  C)-98.2  F (36.8  C)] 97.7  F (36.5  C)  Pulse:  [84-98] 87  Heart Rate:  [] 112  Resp:  [10-19] 11  BP: ()/(64-95) 123/87 mmHg  SpO2:  [95 %-99 %] 96 %  I/O last 3 completed shifts:  In: 900 [P.O.:900]  Out: 575 [Urine:575]    Constitutional: Well developed, well nourished male in no acute distress  Respiratory: Anterior lung sounds clear to auscultation bilaterally, no crackles or wheezes  Cardiovascular: irregularly irregular rhythm, normal rate, S1/S2 without murmur, rubs or gallops  GI: Abdomen soft, non-tender, non-distended, normal bowel sounds  Skin/Integumen:   Other:  alert and appropriate, ?dysarthria, cranial nerves grossly intact      Medications    - MEDICATION INSTRUCTIONS -         enoxaparin  40 mg Subcutaneous Q24H     mirtazapine  15 mg Oral At Bedtime     sodium chloride (PF)  3 mL Intracatheter Q8H     docusate sodium  100 mg Oral Daily     multivitamin, therapeutic  1 tablet Oral Daily     sennosides  1 tablet Oral BID     cholecalciferol  400  Units Oral Daily       Data    Recent Labs  Lab 01/20/17  0540 01/19/17  0715 01/18/17  0510 01/17/17  1228   WBC 5.9 5.6 4.3 3.7*   HGB 11.0* 10.0* 10.7* 10.0*   * 104* 103* 105*   * 135* 155 144*   INR  --  1.15*  --  1.11    138 137 137   POTASSIUM 4.6 3.9 4.6 3.3*   CHLORIDE 109 105 103 102   CO2 20 25 25 30   BUN 23 25 21 16   CR 1.13 1.15 1.16 1.17   ANIONGAP 10 8 9 5   SERINA 8.2* 8.6 9.1 9.5   * 112* 185* 99   ALBUMIN  --  2.9* 3.1* 3.2*   PROTTOTAL  --  6.7* 7.3 7.2   BILITOTAL  --  0.5 0.8 0.8   ALKPHOS  --  237* 243* 242*   ALT  --  45 33 28   AST  --  84* 79* 51*   TROPI  --   --  0.070*  --        No results found for this or any previous visit (from the past 24 hour(s)).             Progress Notes by Michael Lopez MD at 1/20/2017 10:07 AM     Author:  Michael Lopez MD Service:  Nephrology Author Type:  Physician    Filed:  1/20/2017 10:16 AM Note Time:  1/20/2017 10:07 AM Status:  Signed    :  Michael Lopez MD (Physician)           Nephrology Progress Note          Assessment and Plan:   Guillain-[place] syndrome, sl better neurologic findings.  2nd plasma exchange later today.  Chemistries ok except lower Mg++ and Ca++.  Will give IV MgSO4 infusion today, and begin daily oral supplement.  Extra Ca++ will be given today with PLEX.  BP trending better; no need to restart anti-hypertensive Rx yet.  Expect next exchange on 1/22.      Weakness    Generalized muscle weakness    Disturbance of skin sensation    Metastatic cholangiocarcinoma to bone (H)    * No resolved hospital problems. *               Interval History:   no new complaints, alert, oriented to person, place and time and doing well; no cp, sob, n/v/d, or abd pain.  Improved movement in left hand overnight.  VS ok, except still sl tachycardic.  BP ~120/90.  No resp compromise; good rm air SaO2.  I&O ok.  Meds and labs reviewed.  Lytes nl, though CO2 down to 20.  Mg++ 1.4  and Ca++ 8.2.  CBC stable except lower plts, 112K.                Medications:       anticoagulant citrate  500-2,000 mL Apheresis Once     albumin human  5,000 mL Apheresis Once     calcium gluconate intermittent infusion with additives - doses under 5g  6 g Intravenous Once     magnesium oxide  400 mg Oral Daily     enoxaparin  40 mg Subcutaneous Q24H     mirtazapine  15 mg Oral At Bedtime     sodium chloride (PF)  3 mL Intracatheter Q8H     docusate sodium  100 mg Oral Daily     multivitamin, therapeutic  1 tablet Oral Daily     sennosides  1 tablet Oral BID     cholecalciferol  400 Units Oral Daily       - MEDICATION INSTRUCTIONS -                      Physical Exam:       Vital Sign Ranges  Temp:  [97.3  F (36.3  C)-98.2  F (36.8  C)] 97.7  F (36.5  C)  Pulse:  [84-98] 87  Heart Rate:  [] 112  Resp:  [10-19] 11  BP: ()/(64-95) 123/87 mmHg  SpO2:  [95 %-99 %] 96 %    Weight, current:  77.3 kg (actual weight)  Weight change: 0 kg (0 lb)    I/O last 3 completed shifts:  In: 900 [P.O.:900]  Out: 575 [Urine:575]    Physical Exam:   General:  Patient comfortable, in no apparent distress.  Awake, alert, oriented x3.  Neck:  Supple, no JVD.  Lungs:  Clear to auscultation bilaterally.  Cardiac:  Regular rate and rhythm, no murmurs, rub, or gallops.  Abdomen:  Soft, nontender, physiologic sounds.  Extremities:  Without edema.  2+ pulses.  Skin:  Warm, dry.  Neurologic:  Same except left hand improvement..             Data:        Lab Results   Component Value Date     01/20/2017    Lab Results   Component Value Date    CHLORIDE 109 01/20/2017    Lab Results   Component Value Date    BUN 23 01/20/2017      Lab Results   Component Value Date    POTASSIUM 4.6 01/20/2017    Lab Results   Component Value Date    CO2 20 01/20/2017    Lab Results   Component Value Date    CR 1.13 01/20/2017        Lab Results   Component Value Date     01/20/2017     01/19/2017     01/18/2017     Lab Results    Component Value Date    POTASSIUM 4.6 01/20/2017    POTASSIUM 3.9 01/19/2017    POTASSIUM 4.6 01/18/2017     Lab Results   Component Value Date    CHLORIDE 109 01/20/2017    CHLORIDE 105 01/19/2017    CHLORIDE 103 01/18/2017     Lab Results   Component Value Date    CO2 20 01/20/2017    CO2 25 01/19/2017    CO2 25 01/18/2017     Lab Results   Component Value Date    CR 1.13 01/20/2017    CR 1.15 01/19/2017    CR 1.16 01/18/2017     Lab Results   Component Value Date    BUN 23 01/20/2017    BUN 25 01/19/2017    BUN 21 01/18/2017     Lab Results   Component Value Date    HGB 11.0* 01/20/2017    HGB 10.0* 01/19/2017    HGB 10.7* 01/18/2017     No results found for: PH, PHARTERIAL, PO2, [id number], SAT, PCO2, HCO3, BASEEXCESS, RASHID, BEB          Michael Lopez MD  Nephrology; [employer] [place]  [phone]             Progress Notes by Murray Tenorio RT at 1/20/2017  8:20 AM     Author:  Murray Tenorio RT Service:  Respiratory Therapy Author Type:  Respiratory Therapist    Filed:  1/20/2017  8:21 AM Note Time:  1/20/2017  8:20 AM Status:  Signed    :  Murray Tenorio RT (Respiratory Therapist)           Patient was able to achieve a NIF -80 and VC 2200 ml with good effort.  Will cont to follow.    Murray Tenorio RT        Progress Notes by AMANDA KEYS at 1/19/2017  8:15 PM     Author:  AMANDA KEYS Service:  Respiratory Therapy Author Type:  Respiratory Therapist    Filed:  1/19/2017  8:16 PM Note Time:  1/19/2017  8:15 PM Status:  Signed    :  AMANDA KEYS RT (Respiratory Therapist)           Patient was able to achieve a NIF -80 and VC 2000 ml with good effort.  Will cont to follow.  1/19/2017  Amanda Keys RRT       Progress Notes by Mindy Lombardo MD at 1/19/2017  9:40 AM     Author:  Mindy Lombardo MD Service:  Hem/Onc Author Type:  Physician    Filed:  1/19/2017  4:54 PM Note Time:  1/19/2017  9:40 AM Status:  Addendum    :  Mindy Lombardo MD (Physician)       "Related Notes: Original Note by Mindy Lombardo MD (Physician) filed at 2017  9:42 AM         [facility] Progress Note          Assessment and Plan:   71 year old male with metastatic intrahepatic cholangiocarcinoma presenting with sudden onset bilateral upper and lower extremity weakness.    1. Metastatic intrahepatic cholangiocarcinoma with pulmonary, hepatic, and skeletal metastases  - Patient had recent scans performed at [facility] that demonstrated disease progression after 7 cycles of 1st line therapy with gemcitabine and cisplatin.  Plan was to start 2nd line therapy with FOLFOX next week -- all chemo will be placed on hold until clinical improvement in his GBS/weakness.  Can consider a port placement prior to eventual hospital discharge.      2. Generalized weakness, sudden onset  -  Appreciate Neurology evaluation and recommendations. Discussed with Dr. Barriga and Dr. Lopez. 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  Appreciate Nephrology assistance -- started plasmapheresis this AM (17).    3. Anemia related to recent chemotherapy and cancer  - Hgb remains stable.  Continue serial CBC monitoring.    4. Insomnia  - Will restart on mirtazipine to help with sleep as well as appetite and mood.    Full code               Interval History:   Patient reports trouble sleeping.  Feels tired but okay.              Review of Systems:   As per subjective, otherwise 5 systems reviewed and negative.           Physical Exam:   Blood pressure 132/104, pulse 84, temperature 97.4  F (36.3  C), temperature source Oral, resp. rate 16, height 1.778 m (5' 10\"), weight 77.3 kg (170 lb 6.7 oz), SpO2 98 %.      Vital Sign Ranges  Temperature Temp  Av.8  F (36.6  C)  Min: 97.4  F (36.3  C)  Max: 98.1  F (36.7  C)   Blood pressure Systolic (24hrs), Av mmHg, Min:119 mmHg, Max:147 mmHg       Diastolic (24hrs), Av mmHg, Min:85 mmHg, Max:105 mmHg      Pulse No " Data Recorded   Respirations Resp  Av.8  Min: 9  Max: 25   Pulse oximetry SpO2  Av.9 %  Min: 90 %  Max: 99 %         Intake/Output Summary (Last 24 hours) at 17 0940  Last data filed at 17 0511   Gross per 24 hour   Intake    795 ml   Output   1150 ml   Net   -355 ml       Constitutional:   No acute distress.   Extremities:   No clubbing, cyanosis, or edema.   Neurological:   No change in strength in the upper and lower extremities.            Medications:     No current outpatient prescriptions on file.                Data:     Results for orders placed or performed during the hospital encounter of 17 (from the past 24 hour(s))   XR Lumbar Puncture Spinal Tap Diag    Narrative    EXAM: XR LUMBAR PUNCTURE SPINAL TAP DIAGNOSTIC  2017 9:43 AM       History:  Extremity weakness, rule out Guillain [place].     PROCEDURE: The patient consented for a lumbar puncture. The benefits  and risks of the procedure were explained to the patient, including  but not limited to worsening headache, hemorrhage, infection, lower  extremity pain, or nerve root injury. The patient was sterilely  prepped and draped with the patient in the supine position, over the  lower back. Under fluoroscopic guidance, the interlaminar spaces were  noted. 1% lidocaine was administered for local anesthetic over the  L2-3 interlaminar space, and a 22 gauge needle was advanced into the  thecal sac under fluoroscopic guidance. There was initial aspiration  of clear CSF. About 8 cc's total were aspirated.  The needle was  removed. There were no immediate complications associated with the  procedure.       Fluoro time: 0.2 minutes.   Number of Images: 2      Impression    IMPRESSION: Successful lumbar puncture.    JUAN NGUYEN PA-C   Nephrology IP Consult: Patient to be seen: Routine - within 24 hours; plasmaphoresis for gullian-barre; Consultant may enter orders: Yes    Narrative    Michael Lopze MD      2017  4:24 PM  See dictation.    Guillain-[place] syndrome.  Asked to assist re: treatment with   plasma exchange.  Pt consents to this treatment and placement of   tunneled IJ dialysis catheter for apheresis access.  Will begin    AM.    Thanks.    Michael Lopez MD  Nephrology; [employer]  [phone]         Echo Bubble Study with Lumason    Narrative    Interpretation Summary                    [facility]  Echocardiography Laboratory  [address] [place] [id number]        Name: RODNEY MARIE  MRN: [id number]  : 1945  Study Date: 2017 02:46 PM  Age: 71 yrs  Gender: Male  Patient Location: [facility]  Reason For Study: Cerebrovascular Incident  Ordering Physician: RACHELEL GIL  Referring Physician: Gianna Baptistellet Mott  Performed By: Sulma Roberson RDCS     BSA: 1.9 m2  Height: 70 in  Weight: 167 lb  HR: 89  BP: 125/97 mmHg  ______________________________________________________________________________        Procedure  Complete Portable Bubble Echo Adult. Contrast Lumason.  ______________________________________________________________________________     Interpretation Summary     Left ventricular systolic function is borderline reduced.The left ventricle  visual ejection fraction is estimated at 50-55%.  The right ventricular systolic function is normal.  A contrast injection (Bubble Study) was performed that was negative for flow  across the interatrial septum.  Trivial to small pericardial effusion.  The rhythm was atrial fibrillation.     ______________________________________________________________________________           Left Ventricle  The left ventricle is normal in size. There is normal left ventricular wall  thickness. The visual ejection fraction is estimated at 50-55%. Left  ventricular systolic function is borderline reduced. There is borderline-mild  global hypokinesia of the left ventricle.     Right  Ventricle  The right ventricle is normal size. The right ventricular systolic function  is normal.  Atria  The left atrium is mildly dilated. The right atrium is mildly dilated. There  is no color Doppler evidence of an atrial shunt. A contrast injection (Bubble  Study) was performed that was negative for flow across the interatrial  septum.     Mitral Valve  There is trace mitral regurgitation.     Tricuspid Valve  Right ventricular systolic pressure could not be approximated due to  inadequate tricuspid regurgitation.     Aortic Valve  The aortic valve is trileaflet. There is trace aortic regurgitation. No  aortic stenosis is present.     Pulmonic Valve  The pulmonic valve is not well visualized. There is no pulmonic valvular  stenosis.     Vessels  The aortic root is normal size. Normal size ascending aorta. The IVC is  normal in size and reactivity with respiration, suggesting normal central  venous pressure.  Pericardium  trivial to small pericardial effusion.     Rhythm  The rhythm was atrial fibrillation.     ______________________________________________________________________________  MMode/2D Measurements & Calculations  IVSd: 1.1 cm  LVIDd: 4.8 cm  LVIDs: 3.7 cm  LVPWd: 1.1 cm  FS: 23.6 %  EDV(Teich): 109.2 ml  ESV(Teich): 57.8 ml  LV mass(C)d: 192.9 grams  Ao root diam: 3.6 cm  LA dimension: 4.2 cm  asc Aorta Diam: 3.6 cm  LA/Ao: 1.2  LVOT diam: 2.3 cm  LVOT area: 4.3 cm2  LA Volume (BP): 56.8 ml     LA Volume Index (BP): 29.4 ml/m2        Doppler Measurements & Calculations  MV E max caden: 116.5 cm/sec  MV P1/2t max caden: 113.2 cm/sec  MV P1/2t: 42.9 msec  MVA(P1/2t): 5.1 cm2  MV dec slope: 774.0 cm/sec2  MV dec time: 0.14 sec              ______________________________________________________________________________        Report approved by: Reginaldo Benitez 01/18/2017 03:56 PM      CT Head Neck Angio w/o & w Contrast    Narrative    CT ANGIOGRAM OF THE HEAD AND NECK WITHOUT AND WITH CONTRAST   1/18/2017  6:08 PM     HISTORY: Admitted yesterday for generalized weakness. Evaluate for  stroke. Small infarcts in the cortex of the right frontal lobe on  yesterday's MRI. Known osseous metastases and C7-T1 epidural tumor.    TECHNIQUE:  Precontrast localizing scans were followed by CT  angiography with an injection of 70 mL Isovue-370 IV with scans  through the head and neck.  Images were transferred to a separate 3-D  workstation where multiplanar reformations and 3-D images were  created.  Estimates of carotid stenoses are made relative to the  distal internal carotid artery diameters except as noted. Radiation  dose for this scan was reduced using automated exposure control,  adjustment of the mA and/or kV according to patient size, or iterative  reconstruction technique.    COMPARISON: MRI yesterday.    CT HEAD FINDINGS:  No contrast enhancing lesions.  Cerebral blood flow  is grossly normal.    CT ANGIOGRAM HEAD FINDINGS:  Arteries are widely patent with no  aneurysm, significant stenosis, occlusion or intraarterial thrombus.  Venous circulation is unremarkable.    CT ANGIOGRAM NECK FINDINGS:   Right carotid artery: No significant stenosis.      Left carotid artery: Minimal calcified plaque.  No significant  stenosis.      Vertebral arteries: No significant stenosis.      Other findings: There is a dominant poorly circumscribed nodule in the  right upper lobe about 2 cm diameter. There are also numerous much  smaller peripheral nodules in the upper lobes. The appearance is  suggestive of metastatic disease. Destructive lesion is seen in the C7  vertebral body.      Impression    IMPRESSION:   1. Minimal plaque in the proximal left internal carotid artery.  2. Otherwise normal CT angiogram of the head and neck.  3. Bone and lung metastases.    CANELO CHOI MD   Vitamin B12   Result Value Ref Range    Vitamin B12 >6000 (H) 193 - 986 pg/mL   Folate   Result Value Ref Range    Folate 17.7 >5.4 ng/mL    Comprehensive metabolic panel   Result Value Ref Range    Sodium 138 133 - 144 mmol/L    Potassium 3.9 3.4 - 5.3 mmol/L    Chloride 105 94 - 109 mmol/L    Carbon Dioxide 25 20 - 32 mmol/L    Anion Gap 8 3 - 14 mmol/L    Glucose 112 (H) 70 - 99 mg/dL    Urea Nitrogen 25 7 - 30 mg/dL    Creatinine 1.15 0.66 - 1.25 mg/dL    GFR Estimate 63 >60 mL/min/1.7m2    GFR Estimate If Black 76 >60 mL/min/1.7m2    Calcium 8.6 8.5 - 10.1 mg/dL    Bilirubin Total 0.5 0.2 - 1.3 mg/dL    Albumin 2.9 (L) 3.4 - 5.0 g/dL    Protein Total 6.7 (L) 6.8 - 8.8 g/dL    Alkaline Phosphatase 237 (H) 40 - 150 U/L    ALT 45 0 - 70 U/L    AST 84 (H) 0 - 45 U/L   CBC with platelets   Result Value Ref Range    WBC 5.6 4.0 - 11.0 10e9/L    RBC Count 2.91 (L) 4.4 - 5.9 10e12/L    Hemoglobin 10.0 (L) 13.3 - 17.7 g/dL    Hematocrit 30.3 (L) 40.0 - 53.0 %     (H) 78 - 100 fl    MCH 34.4 (H) 26.5 - 33.0 pg    MCHC 33.0 31.5 - 36.5 g/dL    RDW 17.1 (H) 10.0 - 15.0 %    Platelet Count 135 (L) 150 - 450 10e9/L   INR   Result Value Ref Range    INR 1.15 (H) 0.86 - 1.14                    Progress Notes by Chris Ferrell RT at 1/19/2017  3:25 PM     Author:  Chris Ferrell RT Service:  (none) Author Type:  Respiratory Therapist    Filed:  1/19/2017  3:27 PM Note Time:  1/19/2017  3:25 PM Status:  Signed    :  Chris Ferrell RT (Respiratory Therapist)           Patient was able to achieve on the  NIF -80 and the VC 2100ml.  1/19/2017  Chris Ferrell         Progress Notes by Patsy Gauthier MD at 1/19/2017  2:55 PM     Author:  Patsy Gauthier MD Service:  Neurology Author Type:  Physician    Filed:  1/19/2017  3:07 PM Note Time:  1/19/2017  2:55 PM Status:  Signed    :  Patsy Gauthier MD (Physician)           [facility]    Neurology Progress Note    Date of Service (when I saw the patient): 01/19/2017     Today's developments:PLEX day 1 on 1-19-17- he tolerated well.  Sent out  paraneoplastic panels.  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    Assessment and Plan  Rick Teixeira is a 71 year old male who was admitted on 1/17/2017. I was asked to see the patient for generalized weakness.      #. 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  --I discussed his presentation with Dr. Lombardo- she has not seen any of his chemo treatments result is such severe and quick weakness, including gemcitabine which can cause slowly progressive weakness.  --PLEX day 1 on 1-19-17  --patient non-ambulatory, hypercoag (strokes), so PLEX is more appropriate than IVIG if he tolerates it.  --NIF, FVC BID- stable so far, more often if respiratory weakness or abnormal values found  #anemic- may add to weakness.  #. 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    Patsy Gauthier MD          Interval History  He feels that the weakness is unchanged from  before.  No speech problems, no problems with swallowing.  Pain in the back that is chronic, but worse with having to sleep on his back.    Physical Exam  Temp: 97.9  F (36.6  C) Temp src: Oral BP: 114/88 mmHg Pulse: 87 Heart Rate: 103 Resp: 16 SpO2: 97 % O2 Device: None (Room air)    Filed Vitals:    01/18/17 0430 01/19/17 0645 01/19/17 0926   Weight: 76.1 kg (167 lb 12.3 oz) 77.3 kg (170 lb 6.7 oz) 77.3 kg (170 lb 6.7 oz)     Vital Signs with Ranges  Temp:  [97.4  F (36.3  C)-98.1  F (36.7  C)] 97.9  F (36.6  C)  Pulse:  [83-98] 87  Heart Rate:  [] 103  Resp:  [9-19] 16  BP: ()/() 114/88 mmHg  SpO2:  [93 %-99 %] 97 %  I/O last 3 completed shifts:  In: 795 [P.O.:795]  Out: 1350 [Urine:1350]      General Appearance:  No acute distress  Neuro:       Mental Status Exam:    A,A, fully oriented       Cranial Nerves: EOMI, face equal and symmetric and normal strength , speech normal       Motor:   3/5 shoulder shrug, 2/5 throughout BUE otherwise.  3/5 L hip flex, 2/5 R hip flex, 3/5 B knee flexion, 3/5 B toe dorsiflexion       Reflexes:  0 throughout, downgoing toes  CV: RRR nl s1, s2  Resp: CTAB    Extremities: No clubbing, no cyanosis, no edema    Medications    - MEDICATION INSTRUCTIONS -         enoxaparin  40 mg Subcutaneous Q24H     sodium chloride (PF)  3 mL Intracatheter Q8H     docusate sodium  100 mg Oral Daily     multivitamin, therapeutic  1 tablet Oral Daily     sennosides  1 tablet Oral BID     cholecalciferol  400 Units Oral Daily       Data  Results for orders placed or performed during the hospital encounter of 01/17/17 (from the past 24 hour(s))   Nephrology IP Consult: Patient to be seen: Routine - within 24 hours; plasmaphoresis for gullian-barre; Consultant may enter orders: Yes    Narrative    Michael Lopez MD     1/18/2017  4:24 PM  See dictation.    Guillain-[place] syndrome.  Asked to assist re: treatment with   plasma exchange.  Pt consents to this treatment and  placement of   tunneled IJ dialysis catheter for apheresis access.  Will begin    AM.    Thanks.    Michael Lopez MD  Nephrology; [facility]  [phone]         Echo Bubble Study with Lumason    Narrative    Interpretation Summary                    [facility]  Echocardiography Laboratory  [address] [place] [address]        Name: RODNEY MARIE  MRN: [id number]  : 1945  Study Date: 2017 02:46 PM  Age: 71 yrs  Gender: Male  Patient Location: [facility]  Reason For Study: Cerebrovascular Incident  Ordering Physician: RACHELLE GIL  Referring Physician: Gianna Baptiste [facility] [place] [place]  Performed By: Sulma Roberson RDCS     BSA: 1.9 m2  Height: 70 in  Weight: 167 lb  HR: 89  BP: 125/97 mmHg  ______________________________________________________________________________        Procedure  Complete Portable Bubble Echo Adult. Contrast Lumason.  ______________________________________________________________________________     Interpretation Summary     Left ventricular systolic function is borderline reduced.The left ventricle  visual ejection fraction is estimated at 50-55%.  The right ventricular systolic function is normal.  A contrast injection (Bubble Study) was performed that was negative for flow  across the interatrial septum.  Trivial to small pericardial effusion.  The rhythm was atrial fibrillation.     ______________________________________________________________________________           Left Ventricle  The left ventricle is normal in size. There is normal left ventricular wall  thickness. The visual ejection fraction is estimated at 50-55%. Left  ventricular systolic function is borderline reduced. There is borderline-mild  global hypokinesia of the left ventricle.     Right Ventricle  The right ventricle is normal size. The right ventricular systolic function  is normal.  Atria  The left atrium is mildly dilated. The right atrium is  mildly dilated. There  is no color Doppler evidence of an atrial shunt. A contrast injection (Bubble  Study) was performed that was negative for flow across the interatrial  septum.     Mitral Valve  There is trace mitral regurgitation.     Tricuspid Valve  Right ventricular systolic pressure could not be approximated due to  inadequate tricuspid regurgitation.     Aortic Valve  The aortic valve is trileaflet. There is trace aortic regurgitation. No  aortic stenosis is present.     Pulmonic Valve  The pulmonic valve is not well visualized. There is no pulmonic valvular  stenosis.     Vessels  The aortic root is normal size. Normal size ascending aorta. The IVC is  normal in size and reactivity with respiration, suggesting normal central  venous pressure.  Pericardium  trivial to small pericardial effusion.     Rhythm  The rhythm was atrial fibrillation.     ______________________________________________________________________________  MMode/2D Measurements & Calculations  IVSd: 1.1 cm  LVIDd: 4.8 cm  LVIDs: 3.7 cm  LVPWd: 1.1 cm  FS: 23.6 %  EDV(Teich): 109.2 ml  ESV(Teich): 57.8 ml  LV mass(C)d: 192.9 grams  Ao root diam: 3.6 cm  LA dimension: 4.2 cm  asc Aorta Diam: 3.6 cm  LA/Ao: 1.2  LVOT diam: 2.3 cm  LVOT area: 4.3 cm2  LA Volume (BP): 56.8 ml     LA Volume Index (BP): 29.4 ml/m2        Doppler Measurements & Calculations  MV E max caden: 116.5 cm/sec  MV P1/2t max caden: 113.2 cm/sec  MV P1/2t: 42.9 msec  MVA(P1/2t): 5.1 cm2  MV dec slope: 774.0 cm/sec2  MV dec time: 0.14 sec              ______________________________________________________________________________        Report approved by: Reginaldo Benitez 01/18/2017 03:56 PM      CT Head Neck Angio w/o & w Contrast    Narrative    CT ANGIOGRAM OF THE HEAD AND NECK WITHOUT AND WITH CONTRAST  1/18/2017  6:08 PM     HISTORY: Admitted yesterday for generalized weakness. Evaluate for  stroke. Small infarcts in the cortex of the right frontal lobe  on  yesterday's MRI. Known osseous metastases and C7-T1 epidural tumor.    TECHNIQUE:  Precontrast localizing scans were followed by CT  angiography with an injection of 70 mL Isovue-370 IV with scans  through the head and neck.  Images were transferred to a separate 3-D  workstation where multiplanar reformations and 3-D images were  created.  Estimates of carotid stenoses are made relative to the  distal internal carotid artery diameters except as noted. Radiation  dose for this scan was reduced using automated exposure control,  adjustment of the mA and/or kV according to patient size, or iterative  reconstruction technique.    COMPARISON: MRI yesterday.    CT HEAD FINDINGS:  No contrast enhancing lesions.  Cerebral blood flow  is grossly normal.    CT ANGIOGRAM HEAD FINDINGS:  Arteries are widely patent with no  aneurysm, significant stenosis, occlusion or intraarterial thrombus.  Venous circulation is unremarkable.    CT ANGIOGRAM NECK FINDINGS:   Right carotid artery: No significant stenosis.      Left carotid artery: Minimal calcified plaque.  No significant  stenosis.      Vertebral arteries: No significant stenosis.      Other findings: There is a dominant poorly circumscribed nodule in the  right upper lobe about 2 cm diameter. There are also numerous much  smaller peripheral nodules in the upper lobes. The appearance is  suggestive of metastatic disease. Destructive lesion is seen in the C7  vertebral body.      Impression    IMPRESSION:   1. Minimal plaque in the proximal left internal carotid artery.  2. Otherwise normal CT angiogram of the head and neck.  3. Bone and lung metastases.    CANELO CHOI MD   Vitamin B12   Result Value Ref Range    Vitamin B12 >6000 (H) 193 - 986 pg/mL   Folate   Result Value Ref Range    Folate 17.7 >5.4 ng/mL   Comprehensive metabolic panel   Result Value Ref Range    Sodium 138 133 - 144 mmol/L    Potassium 3.9 3.4 - 5.3 mmol/L    Chloride 105 94 - 109 mmol/L    Carbon  Dioxide 25 20 - 32 mmol/L    Anion Gap 8 3 - 14 mmol/L    Glucose 112 (H) 70 - 99 mg/dL    Urea Nitrogen 25 7 - 30 mg/dL    Creatinine 1.15 0.66 - 1.25 mg/dL    GFR Estimate 63 >60 mL/min/1.7m2    GFR Estimate If Black 76 >60 mL/min/1.7m2    Calcium 8.6 8.5 - 10.1 mg/dL    Bilirubin Total 0.5 0.2 - 1.3 mg/dL    Albumin 2.9 (L) 3.4 - 5.0 g/dL    Protein Total 6.7 (L) 6.8 - 8.8 g/dL    Alkaline Phosphatase 237 (H) 40 - 150 U/L    ALT 45 0 - 70 U/L    AST 84 (H) 0 - 45 U/L   CBC with platelets   Result Value Ref Range    WBC 5.6 4.0 - 11.0 10e9/L    RBC Count 2.91 (L) 4.4 - 5.9 10e12/L    Hemoglobin 10.0 (L) 13.3 - 17.7 g/dL    Hematocrit 30.3 (L) 40.0 - 53.0 %     (H) 78 - 100 fl    MCH 34.4 (H) 26.5 - 33.0 pg    MCHC 33.0 31.5 - 36.5 g/dL    RDW 17.1 (H) 10.0 - 15.0 %    Platelet Count 135 (L) 150 - 450 10e9/L   INR   Result Value Ref Range    INR 1.15 (H) 0.86 - 1.14         Images and Procedures:  I personally reviewed the images of the following studies:  EMG: prolonged sensory distal latencies, prolonged F waves, decreased ampitudes, NCS dispersion present.  NIF and FVC are normal.       Progress Notes by Enrique Ahn MD at 1/19/2017 11:19 AM     Author:  Enrique Ahn MD Service:  Hospitalist Author Type:  Physician    Filed:  1/19/2017  1:59 PM Note Time:  1/19/2017 11:19 AM Status:  Signed    :  Enrique Ahn MD (Physician)           [facility]    Hospitalist Progress Note    Date of Service (when I saw the patient): 01/19/2017    Assessment and Plan  Rick Teixeira is a 71 year old male who was admitted on 1/17/2017.  Past medical history including metastatic cholangiocarcinoma presenting with acute lower extremity weakness.        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  MRI brain with recent infarcts, however, location would not explain his diffuse weakness.  MRI spine (cervical, thoracic and lumbar) with  metastatic lesions at C7, T2 and T12 but negative for cord impingement.  Underwent LP 1/18 with finding of elevated protein level with normal WBC.  Per Neuro, EMG 1/18 also abnormal concerning for Guillain-[place].  Nephrology consulted, tunneled cath placed 1/19 by IR.  - initiating plasma exchange 1/19 (plan for 5-7 treatments)   - Neph and Neuro assistance much appreciated    Metastatic cholangiocarcinoma:  Follows with [facility].  MRI demonstrating metastatic disease as noted.  Radiation Oncology consulted, radiation treatment to be determined.  MOHPA consulted, was to start new FOLFOX regimen next week but further chemo on hold until functional status improved.  - may consider port placement prior to discharge (would pursue before initiating anticoagulation)  - Onc and Rad Onc recs appreciated    Small recent cortical brain infarcts: MRI of the brain showed small areas of restricted diffusion in the cortex of the right frontal lobe.  CT angio 1/18 without occlusion or stenosis.  No brain metastases identified.  Likely embolic in setting of A-fib.  TTE with EF 50-55%, negative bubble.  TSH, A1C wnl.  LDL elevated at 117.  - Neuro recommends anticoagulation with apixaban once plasma exchange complete, will also need statin  - permissive HTN  - SLP recommends regular diet    Paroxysmal A-fib:  Family reports history of A-fib diagnosed at prior hospitalization.  Decision was made against anticoagulation due to limited life expectancy despite hypercoagulable state of malignancy.  - continue tele, currently rate controlled without kelsie blocking agents  - metoprolol IV prn  - ultimately start apixaban once pheresis complete    Hypertension: Holding PTA atenolol and amlodipine for permissive HTN.    - resume BB and CCB as indicated  - lisinopril needs to be held until plasmapheresis complete    Hypokalemia: Patient with low potassium and magnesium on admission.  - electrolyte protocol    Anemia, macrocytic,  "thrombocytopenia, pancytopenia:  Presume secondary to chemotherapy.  MCV is 105.  White blood cell count and platelet count are also reduced.  Hemoglobin is 10.0 on admission.  - cell counts stable, monitor    DVT Prophylaxis: Pneumatic Compression Devices, start lovenox  Code Status: Full Code    Disposition: Expected discharge pending clinical improvement, completion of pheresis.    Enrique Ahn    Interval History  Reports weakness is unchanged to slightly improved, no change in sense of paresthesias.  Family concerned his speech sounds slurred but he attributes this to fatigue after only sleeping about 1 hour overnight.  Reports breathing is slightly \"different\" today but denies any shortness of breath.  Denies dysphagia.      -Data reviewed today: I reviewed all new labs and imaging results over the last 24 hours. I personally reviewed no images or EKG's today.    Physical Exam  Temp: 97.4  F (36.3  C) Temp src: Oral BP: 116/78 mmHg Pulse: 93 Heart Rate: 95 Resp: 12 SpO2: 98 % O2 Device: None (Room air)    Filed Vitals:    01/18/17 0430 01/19/17 0645 01/19/17 0926   Weight: 76.1 kg (167 lb 12.3 oz) 77.3 kg (170 lb 6.7 oz) 77.3 kg (170 lb 6.7 oz)     Vital Signs with Ranges  Temp:  [97.4  F (36.3  C)-98.1  F (36.7  C)] 97.4  F (36.3  C)  Pulse:  [83-95] 93  Heart Rate:  [] 95  Resp:  [9-25] 12  BP: ()/() 116/78 mmHg  SpO2:  [93 %-99 %] 98 %  I/O last 3 completed shifts:  In: 795 [P.O.:795]  Out: 1350 [Urine:1350]    Constitutional: Well developed, well nourished male in no acute distress  Respiratory: Anterior lung sounds clear to auscultation bilaterally, no crackles or wheezes  Cardiovascular: irregularly irregular rhythm, normal rate, S1/S2 without murmur, rubs or gallops  GI: Abdomen soft, non-tender, non-distended, normal bowel sounds  Skin/Integumen:   Other:  alert and appropriate, ?dysarthria, cranial nerves grossly intact      Medications    - MEDICATION INSTRUCTIONS -         sodium " chloride (PF)  10 mL Intracatheter Q1H     heparin  3 mL Intracatheter Q24H     heparin Lock (1000 units/mL High concentration)  2-6 mL Intracatheter Once     sodium chloride (PF)  3 mL Intracatheter Q8H     docusate sodium  100 mg Oral Daily     multivitamin, therapeutic  1 tablet Oral Daily     sennosides  1 tablet Oral BID     cholecalciferol  400 Units Oral Daily       Data    Recent Labs  Lab 01/19/17  0715 01/18/17  0510 01/17/17  1228   WBC 5.6 4.3 3.7*   HGB 10.0* 10.7* 10.0*   * 103* 105*   * 155 144*   INR 1.15*  --  1.11    137 137   POTASSIUM 3.9 4.6 3.3*   CHLORIDE 105 103 102   CO2 25 25 30   BUN 25 21 16   CR 1.15 1.16 1.17   ANIONGAP 8 9 5   SERINA 8.6 9.1 9.5   * 185* 99   ALBUMIN 2.9* 3.1* 3.2*   PROTTOTAL 6.7* 7.3 7.2   BILITOTAL 0.5 0.8 0.8   ALKPHOS 237* 243* 242*   ALT 45 33 28   AST 84* 79* 51*   TROPI  --  0.070*  --        Recent Results (from the past 24 hour(s))   CT Head Neck Angio w/o & w Contrast    Narrative    CT ANGIOGRAM OF THE HEAD AND NECK WITHOUT AND WITH CONTRAST  1/18/2017  6:08 PM     HISTORY: Admitted yesterday for generalized weakness. Evaluate for  stroke. Small infarcts in the cortex of the right frontal lobe on  yesterday's MRI. Known osseous metastases and C7-T1 epidural tumor.    TECHNIQUE:  Precontrast localizing scans were followed by CT  angiography with an injection of 70 mL Isovue-370 IV with scans  through the head and neck.  Images were transferred to a separate 3-D  workstation where multiplanar reformations and 3-D images were  created.  Estimates of carotid stenoses are made relative to the  distal internal carotid artery diameters except as noted. Radiation  dose for this scan was reduced using automated exposure control,  adjustment of the mA and/or kV according to patient size, or iterative  reconstruction technique.    COMPARISON: MRI yesterday.    CT HEAD FINDINGS:  No contrast enhancing lesions.  Cerebral blood flow  is grossly  normal.    CT ANGIOGRAM HEAD FINDINGS:  Arteries are widely patent with no  aneurysm, significant stenosis, occlusion or intraarterial thrombus.  Venous circulation is unremarkable.    CT ANGIOGRAM NECK FINDINGS:   Right carotid artery: No significant stenosis.      Left carotid artery: Minimal calcified plaque.  No significant  stenosis.      Vertebral arteries: No significant stenosis.      Other findings: There is a dominant poorly circumscribed nodule in the  right upper lobe about 2 cm diameter. There are also numerous much  smaller peripheral nodules in the upper lobes. The appearance is  suggestive of metastatic disease. Destructive lesion is seen in the C7  vertebral body.      Impression    IMPRESSION:   1. Minimal plaque in the proximal left internal carotid artery.  2. Otherwise normal CT angiogram of the head and neck.  3. Bone and lung metastases.    CANELO CHOI MD                Progress Notes by Michael Lopez MD at 1/19/2017  1:33 PM     Author:  Michael Lopez MD Service:  Nephrology Author Type:  Physician    Filed:  1/19/2017  1:35 PM Note Time:  1/19/2017  1:33 PM Status:  Addendum    :  Michael Lopez MD (Physician)      Related Notes: Original Note by Michael Lopez MD (Physician) filed at 1/19/2017  1:35 PM         Stable 1st Plasma Exchange.  150% plasma volume replaced with Albumin 5%.  IJ catheter working well.  VS stable throughout.  Next treatment 1/20.    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 Rivero pre-treatment, consent reviewed and signed.      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  Stable sx's since 1/18.  Tunneled IJ catheter placed without complication; placement film looks good.  Ready to begin Plasma Exchange.  Consent obtained.  150% exchange today and tomorrow.      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 *               Interval History:   no new complaints, alert, oriented to person, place and time and doing well; no cp, sob, n/v/d, or abd pain.  Did not sleep well.  VS ok.  Same controlled a fib and no sign of resp compromise.  BP remains sl high, ~130-140/100.  Continues off usual BP meds.  Good UO overnight.  Wt sl higher.  Meds and labs reviewed.  Lytes nl.  Cr same, 1.15.  Ca++ and Alb sl lower, 8.6 and 2.9.  CBC and INR same               Medications:       sodium chloride (PF)  10 mL Intracatheter Q1H     heparin  3 mL Intracatheter Q24H     anticoagulant citrate  500-2,000 mL Apheresis Once     albumin human  5,000 mL Apheresis Once     calcium gluconate intermittent infusion with additives - doses under " 5g  5 g Intravenous Once     heparin Lock (1000 units/mL High concentration)  2-6 mL Intracatheter Once     sodium chloride (PF)  3 mL Intracatheter Q8H     docusate sodium  100 mg Oral Daily     multivitamin, therapeutic  1 tablet Oral Daily     sennosides  1 tablet Oral BID     cholecalciferol  400 Units Oral Daily       - MEDICATION INSTRUCTIONS -                      Physical Exam:       Vital Sign Ranges  Temp:  [97.4  F (36.3  C)-98.1  F (36.7  C)] 97.4  F (36.3  C)  Heart Rate:  [] 75  Resp:  [9-25] 14  BP: (119-147)/() 138/102 mmHg  SpO2:  [90 %-99 %] 95 %    Weight, current:  77.3 kg (actual weight)  Weight change: 2.457 kg (5 lb 6.7 oz)    I/O last 3 completed shifts:  In: 795 [P.O.:795]  Out: 1350 [Urine:1350]    Physical Exam:   General:  Patient comfortable, in no apparent distress.  Awake, alert, oriented x3.  Neck:  Supple, no JVD.  Lungs:  Clear to auscultation bilaterally.  Cardiac:  Irregular rate and rhythm, no murmurs, rub, or gallops.  Abdomen:  Soft, nontender, physiologic sounds.  Extremities:  Without edema.  2+ pulses.  Skin:  Warm, dry.  Neurologic:  Same generalized weakness.             Data:        Lab Results   Component Value Date     01/19/2017    Lab Results   Component Value Date    CHLORIDE 105 01/19/2017    Lab Results   Component Value Date    BUN 25 01/19/2017      Lab Results   Component Value Date    POTASSIUM 3.9 01/19/2017    Lab Results   Component Value Date    CO2 25 01/19/2017    Lab Results   Component Value Date    CR 1.15 01/19/2017        Lab Results   Component Value Date     01/19/2017     01/18/2017     01/17/2017     Lab Results   Component Value Date    POTASSIUM 3.9 01/19/2017    POTASSIUM 4.6 01/18/2017    POTASSIUM 3.3* 01/17/2017     Lab Results   Component Value Date    CHLORIDE 105 01/19/2017    CHLORIDE 103 01/18/2017    CHLORIDE 102 01/17/2017     Lab Results   Component Value Date    CO2 25 01/19/2017    CO2 25  2017    CO2 30 2017     Lab Results   Component Value Date    CR 1.15 2017    CR 1.16 2017    CR 1.17 2017     Lab Results   Component Value Date    BUN 25 2017    BUN 21 2017    BUN 16 2017     Lab Results   Component Value Date    HGB 10.0* 2017    HGB 10.7* 2017    HGB 10.0* 2017     No results found for: PH, PHARTERIAL, PO2, [id number], SAT, PCO2, HCO3, BASEEXCESS, RASHID, BEB          Michael Lopez MD  Nephrology; [facility]  [phone]             Progress Notes by Jr Sam MD at 2017  8:22 AM     Author:  Jr Sam MD Service:  Radiology Author Type:  Physician    Filed:  2017  8:24 AM Note Time:  2017  8:22 AM Status:  Signed    :  Jr Sam MD (Physician)           RADIOLOGY PROCEDURE NOTE  Patient name: Rick Teixeira  MRN: [id number]  : 1945    Pre-procedure diagnosis: Guillain-[place]  Post-procedure diagnosis: Same    Procedure Date/Time: 2017  8:23 AM  Procedure: Tunneled pheresis catheter placement  Estimated blood loss: 5 ml  Specimen(s) collected with description: None  The patient tolerated the procedure well with no immediate complications.  Significant findings:Palindrome 14.5 fr 19 cm catheter placed via right IJV access. Tip of catheter in upper right atrium.    See imaging dictation for procedural details.    Provider name: Jr Sam  Assistant(s):None           Progress Notes by Jamila Wick NP at 2017 11:38 AM     Author:  Jamila Wick NP Service:  Neurosurgery Author Type:  Nurse Practitioner    Filed:  2017 12:10 PM Note Time:  2017 11:38 AM Status:  Attested    :  Jamila Wick NP (Nurse Practitioner) Cosigner:  Lei Starks MD at 2017  6:27 PM    Attestation signed by Lei Starks MD at 2017  6:27 PM        Agree with above  Neurology plans  "noted  Call if further needed                        [facility]    Neurosurgery Progress Note    Date of Service (when I saw the patient): 01/18/2017     Assessment and Plan  Rick Teixeira is a 71 year old male who was admitted on 1/17/2017 with complaints of upper and lower extremity weakness for the past 2-3 days.  His history is significant for cholangiocarcinoma and lung cancer.  Dr. Starks met with the patient yesterday and the patient had told him his life expectancy was only a \"few months.\"  He states his weakness is greater on the right when compared to the left.  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  Not recommending surgery now. Await recommendations per Radiation Oncology.      I have discussed the following assessment and plan with Dr. Starks who is in agreement with initial plan and will follow up with further consultation recommendations.    Jamila Wick [facility]  [facility]  [address] [address]    Tel [phone]  Pager [phone]      Interval History  No changes    Physical Exam  Temp: 97.4  F (36.3  C) Temp src: Axillary BP: (!) 128/91 mmHg Pulse: 84 Heart Rate: 101 Resp: 15 SpO2: 98 % O2 Device: None (Room air)    Filed Vitals:    01/17/17 1215 01/18/17 0430   Weight: 165 lb (74.844 kg) 167 lb 12.3 oz (76.1 kg)     Vital Signs with Ranges  Temp:  [97.4  F (36.3  C)-98.2  F (36.8  C)] 97.4  F (36.3  C)  Pulse:  [] 84  Heart Rate:  [] 101  Resp:  [10-18] 15  BP: (123-152)/() 128/91 mmHg  SpO2:  [90 %-100 %] 98 %  I/O last 3 completed shifts:  In: 300 [P.O.:300]  Out: 450 [Urine:450]    Heart Rate: 101, Blood pressure 128/91, pulse 84, temperature 97.4  F (36.3  C), temperature source Axillary, resp. rate 15, height 5' 10\" (1.778 m), weight " 167 lb 12.3 oz (76.1 kg), SpO2 98 %.  167 lbs 12.32 oz  HEENT:  Normocephalic, atraumatic.     Neck:  Supple, non-tender, without lymphadenopathy.  Heart:  No peripheral edema  Lungs:  No SOB  Skin:  Warm and dry, good capillary refill.  Extremities:  Good radial and dorsalis pedis pulses bilaterally, no edema, cyanosis or clubbing.    NEUROLOGICAL EXAMINATION:     Mental status:  Alert and Oriented x 3, speech is fluent.  Cranial nerves:  II-XII intact.   Motor:  RUE 3/5 LUE 2/5  DF 4-/5 bilaterally, PF 4/5 bilaterally  Sensation: Some decreased sensation to patchy distribution to light touch  Reflexes:  Negative Babinski.  Negative Clonus.    Gait:  Deferred    Medications       sodium chloride (PF)  3 mL Intracatheter Q8H     docusate sodium  100 mg Oral Daily     multivitamin, therapeutic  1 tablet Oral Daily     sennosides  1 tablet Oral BID     cholecalciferol  400 Units Oral Daily       Data    CBC RESULTS:   Recent Labs   Lab Test  01/18/17   0510   WBC  4.3   RBC  3.06*   HGB  10.7*   HCT  31.6*   MCV  103*   MCH  35.0*   MCHC  33.9   RDW  16.5*   PLT  155     Basic Metabolic Panel:  NA      137   1/18/2017   POTASSIUM      4.6   1/18/2017  CHLORIDE      103   1/18/2017  SERINA      9.1   1/18/2017  CO2       25   1/18/2017  BUN       21   1/18/2017  CR     1.16   1/18/2017  GLC      185   1/18/2017  INR:  INR     1.11   1/17/2017             Progress Notes by Cass Bonilla SLP at 1/18/2017  4:18 PM     Author:  Cass Bonilla SLP Service:  (none) Author Type:  Speech Language Pathologist    Filed:  1/18/2017  4:18 PM Note Time:  1/18/2017  4:18 PM Status:  Signed    :  Cass Bonilla SLP (Speech Language Pathologist)              01/18/17 1601   General Information   Onset Date 01/17/17   Start of Care Date 01/18/17   Referring Physician Enrique Ahn MD   Patient Profile Review/OT: Additional Occupational Profile Info See Profile for full history and prior level of function   Patient/Family  Goals Statement To figure out what is going on.    Swallowing Evaluation Bedside swallow evaluation   Behaviorial Observations WFL (within functional limits)   Mode of current nutrition Oral diet   Type of oral diet Regular;Thin liquid   Respiratory Status Room air   Comments Rick Teixeira is a 71 year old male with past medical history including metastatic cholangiocarcinoma presenting with lower extremity weakness. He was admitted for further evaluation and treatment. Per chart review, possible differential diagnosis is Guillan-[place] syndrome. Upon clinical interview, pt denied any history of swallowing difficulties. Reported tolerating a regular diet and thin liquids at home.    Clinical Swallow Evaluation   Oral Musculature generally intact   Structural Abnormalities none present   Dentition present and adequate   Mucosal Quality good   Mandibular Strength and Mobility intact   Oral Labial Strength and Mobility WFL   Lingual Strength and Mobility other (see comments)  (twitching-like movements noted when protruded tongue)   Velar Elevation intact   Laryngeal Function Cough;Throat clear;Swallow;Voicing initiated   Oral Musculature Comments Generally WFL. Small twitching-like movements noted when protruded tongue.    Additional Documentation Yes   Clinical Swallow Eval: Thin Liquid Texture Trial   Mode of Presentation, Thin Liquids cup;straw   Volume of Liquid or Food Presented sips from cup x3, sips from straw x6   Oral Phase of Swallow WFL   Pharyngeal Phase of Swallow intact   Diagnostic Statement No overt s/sx of aspiration. Prompt swallow response with adequate hyolaryngeal elevation upon manual palpation.    Clinical Swallow Eval: Puree Solid Texture Trial   Mode of Presentation, Puree spoon   Volume of Puree Presented teaspoons x3   Oral Phase, Puree WFL   Pharyngeal Phase, Puree wet vocal quality after swallow  (delayed)   Successful Strategies Trialed During Procedure, Puree other (see  comments)  (volitional throat clear eliminated wet vocal quality)   Diagnostic Statement No coughing, choking, or throat clearing. Delayed wet vocal quality x1 after completion of puree trials. Voice back to baseline when cued to use volitional throat clear. Pt denied feeling of pharyngeal stasis.    Clinical Swallow Eval: Solid Food Texture Trial   Mode of Presentation, Solid fed by clinician   Volume of Solid Food Presented 1 saltine cracker   Oral Phase, Solid WFL   Pharyngeal Phase, Solid intact   Diagnostic Statement Mastication WFL. No oral residue. Denied feeling of pharyngeal stasis. No overt s/sx of aspiration.    Esophageal Phase of Swallow   Patient reports or presents with symptoms of esophageal dysphagia No   Swallow Eval: Clinical Impressions   Skilled Criteria for Therapy Intervention No problems identified which require skilled intervention   Functional Assessment Scale (FAS) 7   Treatment Diagnosis Normal oral and pharyngeal swallow   Diet texture recommendations Regular diet;Thin liquids   Recommended Feeding/Eating Techniques small sips/bites;other (see comments)  (slow pacing, regular oral cares, sit upright at 90 degrees)   Anticipated Discharge Disposition other (see comments)  (defer to OT/PT and medical team)   Risks and Benefits of Treatment have been explained. Yes   Patient, family and/or staff in agreement with Plan of Care Yes   Clinical Impression Comments Clinical swallow evaluation completed per MD request. Pt presents with normal oral and pharyngeal swallowing function. Mastication WFL. Swallow response is prompt with adequate hyolaryngeal elevation upon manual palpation. No overt s/sx of aspiration with sips of thin liquid by cup/straw or dry solid texture. Isolated incidence of delayed gurgly vocal quality after trial of puree which cleared with volitional throat clear. Pt is at increased risk of aspiration given need for full assistance for feeding 2/2 UE weakness. Recommend  continue regular diet and thin liquids. Swallowing/feeding precautions include pt awake/alert, sit up at 90 degrees/in chair, small bites/sips, slow pace, swallow all of oral contents prior to next bite/sip, alternate solids/liquids as needed, regular oral cares. No further SLP needs are identified at this time. SLP will sign off. Thank you for involvement in this pt's care. Please re-consult if needs arise in the future. Defer discharge recommendations to OT/PT.    Total Evaluation Time   Total Evaluation Time (Minutes) 12          Progress Notes by Patsy Gauthier MD at 1/18/2017  3:26 PM     Author:  Patsy Gauthier MD Service:  Neurology Author Type:  Physician    Filed:  1/18/2017  3:37 PM Note Time:  1/18/2017  3:26 PM Status:  Signed    :  Patsy Gauthier MD (Physician)           [facility]    Neurology Progress Note    Date of Service (when I saw the patient): 01/18/2017     Today's developments: Continued severe weakness.  No bulbar symptoms at this time.  NIF/FVC stable.  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    Assessment and Plan  Rick Teixeira is a 71 year old male who was admitted on 1/17/2017. I was asked to see the patient for generalized weakness.      #. 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  --NIF, FVC BID- stable so far, more often if respiratory weakness or abnormal values found  #anemic- may add to weakness.  #. 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  Urinated without problems.  Apparently some confusion/encephalopathy overnight, cleared today.  Denies respiratory distress or swallowing or speech problems    Physical Exam  Temp: 98.1  F (36.7  C) Temp src: Axillary BP: 119/87 mmHg Pulse: 84 Heart Rate: 81 Resp: 17 SpO2: 97 % O2 Device: None (Room air)    Filed Vitals:    01/17/17 1215 01/18/17 0430   Weight: 74.844 kg (165 lb) 76.1 kg (167 lb 12.3 oz)     Vital Signs with Ranges  Temp:  [97.4  F (36.3  C)-98.2  F (36.8  C)] 98.1  F (36.7  C)  Pulse:  [84] 84  Heart Rate:  [] 81  Resp:  [9-25] 17  BP: (119-152)/() 119/87 mmHg  SpO2:  [90 %-98 %] 97 %  I/O last 3 completed shifts:  In: 720 [P.O.:720]  Out: 650 [Urine:650]      General Appearance:  No acute distress  Neuro:       Mental Status Exam:   Fully oriented       Cranial Nerves:  EOMI, face equal and symmetric, speech normal       Motor:   2/5 B biceps, delt, wrist extension, 3/5 finger movements.  B 2/5 hip flexor, 3/5 knee flexion, 3/5 dorsiflexion, 4/5 plantarflexion       Reflexes:  0 throughout  CV: RRR nl s1, s2  Resp: CTAB    Extremities: No clubbing, no cyanosis, no edema    Medications    - MEDICATION INSTRUCTIONS -         sodium chloride (PF)  3 mL Intracatheter Q8H     docusate sodium  100 mg Oral Daily     multivitamin, therapeutic  1 tablet Oral Daily      sennosides  1 tablet Oral BID     cholecalciferol  400 Units Oral Daily       Data  Results for orders placed or performed during the hospital encounter of 01/17/17 (from the past 24 hour(s))   EKG 12 lead   Result Value Ref Range    Interpretation ECG Click View Image link to view waveform and result    Neurology IP Consult: Patient to be seen: Routine - within 24 hours; extremity weakness; Consultant may enter orders: Yes    Narrative    Patsy Gauthier MD     1/18/2017  7:34 AM  [facility]    Neurology Consultation     Date of Admission:  1/17/2017  Date of Consult (When I saw the patient): 01/17/2017    Assessment and Plan  Rick Teixeira is a 71 year old male who was admitted on   1/17/2017. I was asked to see the patient for generalized   weakness.      #. 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  #. 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    Patsy Gauthier MD    Code Status   No Order        Reason for Consult  Reason for consult: I was asked by Dr. Pantoja to evaluate this   patient for severe weakness.      Chief Complaint  Weak all over    History is obtained from the patient and  electronic chart    History of Present Illness  Rick Teixeira is a 71 year old male with metastatic   cholangiocarinoma who presents with rapid/subacute onset of   weakness BUE and BLE over 2 days.  He has been unable to walk for   the past 2 days.  Notes weakness in the arms and legs.  Continued   chronic mid back pain, no other significant new pain, no muscle   pain.  He notes some numbness as well.  No bowel/bladder problems   but was unable to urinate in the ER today.  He denies any facial   weakness, no slurred speech, no problems breathing.  No recent   illness- no URI or diarrhea.  No recent fevers or feeling ill.  Last chemo was 2 weeks ago.  He has plans for starting new chemo   next week.    Past Medical History  I have reviewed this patient's medical history and updated it   with pertinent information if needed.   Past Medical History   Diagnosis Date     Hypertension      Cancer (H)      liver and lung       Past Surgical History  I have reviewed this patient's surgical history and updated it   with pertinent information if needed.  Past Surgical History   Procedure Laterality Date     Genitourinary surgery       testicular cancer       Prior to Admission Medications  Prior to Admission Medications   Prescriptions Last Dose Informant Patient Reported? Taking?   AMLODIPINE BESYLATE PO   Yes Yes   LISINOPRIL PO   Yes Yes      Facility-Administered Medications: None     Allergies  Allergies   Allergen Reactions     Ancef [Cefazolin] Other (See Comments)     Chest heaviness     Penicillins Unknown       Social History  I have reviewed this patient's social history and updated it with   pertinent information if needed. Rick Teixeira  reports that   he has quit smoking. He does not have any smokeless tobacco   history on file. He reports that he does not drink alcohol or use   illicit drugs.    Family History  I have reviewed this patient's family history and updated it with   pertinent information if  needed.   No significant neuro history.     Review of Systems  The 10 point Review of Systems is negative other than noted in   the HPI.    Physical Exam  Temp: 98.2  F (36.8  C) Temp src: Oral BP: (!) 133/101 mmHg   Pulse: 84   Resp: 14 SpO2: 94 % O2 Device: None (Room air)    Vital Signs with Ranges  Temp:  [98.2  F (36.8  C)] 98.2  F (36.8  C)  Pulse:  [] 84  Resp:  [14-18] 14  BP: (124-137)/() 133/101 mmHg  SpO2:  [94 %-100 %] 94 %  165 lbs 0 oz    General Appearance:  No acute distress  Neuro:       Mental Status Exam:  A,A, fully oriented- able to discuss in   detail his chemo treatments, symptoms.       Cranial Nerves: CN 2-12 examined and intact       Motor:  2/5 B prox arms, biceps, 3/5B wrist flexors and hand   , 2/5 B hip flexors, knee flexors, 3/5 B dorsi and plantar   flexion       Reflexes:  0 throughout, downgoing toes, no clonus       Sensory:  Decreased pinprick B arms, R foot up to above   knee, L foot up to below knee       Coordination:  Unable to assess- too weak       Gait:  Unable to assess- too weak  Neck: no nuchal rigidity. No carotid bruits.    Extremities: No clubbing, no cyanosis, no edema  CV: RRR nl s1, s2  Resp: CTAB        Data  Results for orders placed or performed during the hospital   encounter of 01/17/17 (from the past 24 hour(s))   Basic metabolic panel   Result Value Ref Range    Sodium 137 133 - 144 mmol/L    Potassium 3.3 (L) 3.4 - 5.3 mmol/L    Chloride 102 94 - 109 mmol/L    Carbon Dioxide 30 20 - 32 mmol/L    Anion Gap 5 3 - 14 mmol/L    Glucose 99 70 - 99 mg/dL    Urea Nitrogen 16 7 - 30 mg/dL    Creatinine 1.17 0.66 - 1.25 mg/dL    GFR Estimate 62 >60 mL/min/1.7m2    GFR Estimate If Black 75 >60 mL/min/1.7m2    Calcium 9.5 8.5 - 10.1 mg/dL   CBC (+ platelets, no diff)   Result Value Ref Range    WBC 3.7 (L) 4.0 - 11.0 10e9/L    RBC Count 2.87 (L) 4.4 - 5.9 10e12/L    Hemoglobin 10.0 (L) 13.3 - 17.7 g/dL    Hematocrit 30.0 (L) 40.0 - 53.0 %      (H) 78 - 100 fl    MCH 34.8 (H) 26.5 - 33.0 pg    MCHC 33.3 31.5 - 36.5 g/dL    RDW 16.8 (H) 10.0 - 15.0 %    Platelet Count 144 (L) 150 - 450 10e9/L   Magnesium   Result Value Ref Range    Magnesium 1.1 (L) 1.6 - 2.3 mg/dL   INR   Result Value Ref Range    INR 1.11 0.86 - 1.14   Hepatic panel   Result Value Ref Range    Bilirubin Direct 0.3 (H) 0.0 - 0.2 mg/dL    Bilirubin Total 0.8 0.2 - 1.3 mg/dL    Albumin 3.2 (L) 3.4 - 5.0 g/dL    Protein Total 7.2 6.8 - 8.8 g/dL    Alkaline Phosphatase 242 (H) 40 - 150 U/L    ALT 28 0 - 70 U/L    AST 51 (H) 0 - 45 U/L   MR Cervical Spine w/o Contrast    Narrative    MR CERVICAL SPINE W/O CONTRAST   1/17/2017 3:03 PM     HISTORY: bilateral UE/LE weakness    TECHNIQUE:  Multiplanar multisequence MRI of the cervical spine  without gadolinium contrast.     COMPARISON:  None.    FINDINGS: Motion artifact degrades quality of these images.  Infiltrative lesions are seen within the C7 and T2 and T3   vertebral  bodies. This is consistent with metastatic disease to these   vertebrae.  There is abnormal soft tissue extending from the T2 vertebral   body  into the right ventral epidural space and right T2-T3 neural   foramen.  This could affect the right T2 nerve root. There is moderate   central  spinal stenosis at C6-7 due to bulging disc and associated  osteophytes. There is mild flattening of the cord at this level   due to  the degenerative change.    C2-C3:  Normal disc, facet joints, spinal canal and neural   foramina.    C3-C4:  Mild degenerative change in the facet joints.    C4-C5:  Mild annular bulge. Central canal still adequate.   Moderate  foraminal stenosis due to uncinate spurs and loss of disc space  height.    C5-C6:  Loss of disc space height. Diffuse annular disc bulge.   Central  canal mildly narrowed due to the bulging disc. Moderate bilateral  foraminal stenosis due to uncinate spurs.    C6-C7:  Degeneration of the disc. Diffuse annular disc bulge with  associated  posterior osteophytes leading to moderate central   stenosis  and mild flattening of the cord. There is also moderate bilateral  foraminal stenosis at this level due to loss of disc space height   and  uncinate spurs.    C7-T1: Degeneration of the disc. Mild annular disc bulge.   Moderate  bilateral foraminal stenosis due to uncinate spurs.      Impression    IMPRESSION:    1. Study is consistent with bone metastasis to C7, T2, and T3.   There  is a small amount of epidural tumor at the right T2 level which   also  extends into the right T2-3 neural foramen. This could affect the  right T2 nerve root. No central stenosis is seen at this level.  2. Multilevel degenerative change. The degenerative changes are  leading to moderate central stenosis at C6-7 with mild flattening   of  the cord at this level.  3. There is also moderate foraminal stenosis at multiple levels   due to  loss of disc space height and uncinate spurs.    KEILY GARCIA MD   MR Thoracic Spine w/o Contrast    Narrative    MR THORACIC SPINE W/O CONTRAST 1/17/2017 3:04 PM     HISTORY: limited movement of UE/LE    TECHNIQUE: Multiplanar multisequence MRI of the thoracic spine   without  gadolinium contrast.    COMPARISON: None.    FINDINGS: Inversion recovery images reveal infiltrative lesions   within  the the C7, T2 and T12 vertebral bodies. There is also an   infiltrative  process in the T9 lamina and spinous process. These are   consistent  with metastatic disease. A very small amount of epidural tumor is   seen  at the right T3 2 level causing only slight mass effect on the   dural  sac. This is better seen on the MR scan of the cervical spine.   There  are multilevel degenerative changes with loss of disc space   height  throughout the thoracic spine. Mild bulging discs are seen at   T4-5,  C5-6, and there is a small central disc protrusion at T6-7.      Impression    IMPRESSION:    1. Infiltrative lesions within the C7 and T2, and T12 vertebral    bodies  and within the T9 lamina and spinous process. This is consistent   with  metastatic disease.  2. Small amount of epidural tumor at the T2 level but no cord  compression or central stenosis.  3. Multilevel degenerative change.    KEILY GARCIA MD   MR Brain w/o Contrast    Narrative    MR BRAIN W/O CONTRAST 1/17/2017 3:04 PM     HISTORY: bilateral UE/LE weakness    TECHNIQUE: Multiplanar, multisequence MRI of the brain without IV  contrast material. No contrast was administered because of the   length  of the examination. The patient was becoming uncomfortable.    COMPARISON: None.    FINDINGS: Motion artifact degrades quality of these images.    There is  generalized atrophy of the brain. . There are small areas of  restricted diffusion in the cortex of the right frontal lobe.   These  measure about 4 mm in size. These would be compatible with recent  cortical infarcts. No lesions are seen in the cerebellum or left  hemisphere.. . The facial structures appear normal. The arteries   at  the base of the brain and the dural venous sinuses appear patent.   No  brain metastasis are identified. No mass lesions are seen.      Impression    IMPRESSION:   1. Small areas of restricted diffusion in the cortex of the right  frontal lobe. These are consistent with small recent cortical  infarcts.  2. No brain metastasis are identified. No mass lesions are seen.  3. Generalized atrophy of the brain. .  4. No skull metastasis are identified.     KEILY GARCIA MD   MR Lumbar Spine w/o Contrast    Narrative    MR LUMBAR SPINE WITHOUT CONTRAST1/17/2017 2:59 PM      HISTORY: History of cancer, increasing weakness.    TECHNIQUE:  Multiplanar, multisequence MRI of the lumbar spine   without  contrast.     COMPARISON: None.    FINDINGS:This report assumes five lumbar type vertebrae.     The conus and visualized portions of the thoracic spinal cord   appear  normal. The paraspinal soft tissues appear normal as visualized.   The  bone  marrow has normal signal intensity. No bone metastasis are   seen  in the lumbar region. No central spinal stenosis or cord   compression.    L1-L2:   Normal disc, facet joints, spinal canal and neural   foramina.    L2-L3:  Normal disc, facet joints, spinal canal and neural   foramina.    L3-L4:  Degeneration of the disc. Diffuse annular disc bulge.   There is  a small more right central disc herniation causing mild mass   effect on  the dural sac. Central canal is mildly narrowed due to this right  central disc herniation. This herniation extends slightly   inferior to  the level of the disc and posterior to the L4 vertebral body.    L4-L5:  Degeneration of the disks. Loss of disc space height.   Diffuse  annular disc bulge. There is disc material extending into the   right  and left neural foramen causing moderate bilateral foraminal   stenosis.  Mild central stenosis due to the bulging disc and degenerative   change  off the facet joints.    L5-S1:  Normal disc, facet joints, spinal canal and neural   foramina.      Impression    IMPRESSION:   1. No bone metastasis are seen in the lumbar region.  2. Multilevel degenerative change.  3. Moderate size right central disc herniation at L3-L4 causing   mild  mass effect on the dural sac. This could affect the right L5   nerve  root as it arises from the sac. It is also causing mild central  stenosis.  4. Moderate bilateral L4-5 foraminal stenosis due to a bulging   disc  and degenerative change off the facet joints. There is also   moderate  central stenosis and lateral recess stenosis at this level due to  degenerative change off the facet joints and a bulging disc.    KEILY GARCIA MD   EKG 12 lead   Result Value Ref Range    Interpretation ECG Click View Image link to view waveform and   result            Imaging and procedures:  I personally reviewed these images.  MRI brain: very small areas R cortical infarcts  MRI Cspine: bone mets C7, T2, T3; epidural tumor T2;  moderate   central stenosis c6-7  MRI Tspine: as above mets and T2 tumor  MRI L spine: mod R L3-4 disk, no mets         MR Cervical Spine w Contrast    Narrative    MR CERVICAL SPINE WITH CONTRAST 1/17/2017 9:39 PM     HISTORY: Metastatic disease. Evaluation needed with contrast for  treatment purposes.    TECHNIQUE: Postcontrast images were obtained through the cervical  spine with 7 mL of Gadavist.    COMPARISON: MR cervical spine without contrast on same date.    FINDINGS: Postcontrast images show enhancement of the bone lesions in  the C7 and T2 vertebral bodies consistent with osseous metastases. In  addition, there is a small amount of enhancing epidural tumor at the  T2 level in the right anterolateral epidural space causing some mild  central canal stenosis but no cord deformity. There is also enhancing  soft tissue in the right C6-C7 and C7-T1 neural foramen as well as the  right T2-T3 neural foramen consistent with some tumor. There is also a  small amount of epidural tumor in the right anterior epidural space at  C7. There is no evidence for any intradural tumor any intramedullary  tumor. Degenerative changes are also superimposed most advanced at  C6-C7 where there is moderate central canal and bilateral neural  foraminal stenosis along with mild cord deformity. This is just above  the small amount of epidural tumor at C7.      Impression    IMPRESSION: Osseous metastases at C7 and T1 with some epidural tumor  in the right anterior lateral epidural spaces at these levels as well  as some epidural tumor in the right side of neural foramen at C6-C7,  C7-T1, and T2-T3. The epidural tumor is not causing any cord  impingement at this time. There is moderate facet degenerative central  canal stenosis at C6-C7 with some mild cord deformity.    SO MALONE MD   EKG 12-lead, tracing only   Result Value Ref Range    Interpretation ECG Click View Image link to view waveform and result    Comprehensive metabolic  panel   Result Value Ref Range    Sodium 137 133 - 144 mmol/L    Potassium 4.6 3.4 - 5.3 mmol/L    Chloride 103 94 - 109 mmol/L    Carbon Dioxide 25 20 - 32 mmol/L    Anion Gap 9 3 - 14 mmol/L    Glucose 185 (H) 70 - 99 mg/dL    Urea Nitrogen 21 7 - 30 mg/dL    Creatinine 1.16 0.66 - 1.25 mg/dL    GFR Estimate 62 >60 mL/min/1.7m2    GFR Estimate If Black 75 >60 mL/min/1.7m2    Calcium 9.1 8.5 - 10.1 mg/dL    Bilirubin Total 0.8 0.2 - 1.3 mg/dL    Albumin 3.1 (L) 3.4 - 5.0 g/dL    Protein Total 7.3 6.8 - 8.8 g/dL    Alkaline Phosphatase 243 (H) 40 - 150 U/L    ALT 33 0 - 70 U/L    AST 79 (H) 0 - 45 U/L   CBC with platelets   Result Value Ref Range    WBC 4.3 4.0 - 11.0 10e9/L    RBC Count 3.06 (L) 4.4 - 5.9 10e12/L    Hemoglobin 10.7 (L) 13.3 - 17.7 g/dL    Hematocrit 31.6 (L) 40.0 - 53.0 %     (H) 78 - 100 fl    MCH 35.0 (H) 26.5 - 33.0 pg    MCHC 33.9 31.5 - 36.5 g/dL    RDW 16.5 (H) 10.0 - 15.0 %    Platelet Count 155 150 - 450 10e9/L   Magnesium   Result Value Ref Range    Magnesium 2.5 (H) 1.6 - 2.3 mg/dL   Lipid panel reflex to direct LDL   Result Value Ref Range    Cholesterol 197 <200 mg/dL    Triglycerides 131 <150 mg/dL    HDL Cholesterol 54 >39 mg/dL    LDL Cholesterol Calculated 117 (H) <100 mg/dL    Non HDL Cholesterol 143 (H) <130 mg/dL   Hemoglobin A1c   Result Value Ref Range    Hemoglobin A1C 4.8 4.3 - 6.0 %   Troponin I   Result Value Ref Range    Troponin I ES 0.070 (H) 0.000 - 0.045 ug/L   TSH with free T4 reflex   Result Value Ref Range    TSH 0.81 0.40 - 4.00 mU/L   Cell count with differential CSF: Tube 4   Result Value Ref Range    WBC CSF 0 0 - 5 /uL    RBC CSF 21 (H) 0 - 2 /uL    Tube Number 4 #    Color CSF Colorless CLRL    Appearance CSF Clear CLER   Glucose CSF: Tube 1   Result Value Ref Range    Glucose CSF 69 40 - 70 mg/dL   Protein total CSF: Tube 1   Result Value Ref Range    Protein Total CSF 85 (H) 15 - 60 mg/dL   XR Lumbar Puncture Spinal Tap Diag    Narrative    EXAM: XR  LUMBAR PUNCTURE SPINAL TAP DIAGNOSTIC  1/18/2017 9:43 AM       History:  Extremity weakness, rule out Guillain Collins.     PROCEDURE: The patient consented for a lumbar puncture. The benefits  and risks of the procedure were explained to the patient, including  but not limited to worsening headache, hemorrhage, infection, lower  extremity pain, or nerve root injury. The patient was sterilely  prepped and draped with the patient in the supine position, over the  lower back. Under fluoroscopic guidance, the interlaminar spaces were  noted. 1% lidocaine was administered for local anesthetic over the  L2-3 interlaminar space, and a 22 gauge needle was advanced into the  thecal sac under fluoroscopic guidance. There was initial aspiration  of clear CSF. About 8 cc's total were aspirated.  The needle was  removed. There were no immediate complications associated with the  procedure.       Fluoro time: 0.2 minutes.   Number of Images: 2      Impression    IMPRESSION: Successful lumbar puncture.    JUAN NGUYEN PA-C         Images and Procedures:  I personally reviewed the images of the following studies:  I briefly reviewed the EMG- showed increased distal latency, conduction block, decreased ampiltudes, increased F waves       Progress Notes by Giancarlo Cuevas RN at 1/18/2017  3:31 PM     Author:  Giancarlo Cuevas RN Service:  (none) Author Type:  Registered Nurse    Filed:  1/18/2017  3:31 PM Note Time:  1/18/2017  3:31 PM Status:  Signed    :  Giancarlo Cuevas RN (Registered Nurse)           Per olivia Amin for regular diet starting now.        Progress Notes by Enrique Ahn MD at 1/18/2017  2:48 PM     Author:  Enrique Ahn MD Service:  Hospitalist Author Type:  Physician    Filed:  1/18/2017  3:06 PM Note Time:  1/18/2017  2:48 PM Status:  Signed    :  Enrique Ahn MD (Physician)           [facility]    Hospitalist Progress Note    Date of Service (when I saw the patient):  01/18/2017    Assessment and Plan  Rick Teixeiar is a 71 year old male who was admitted on 1/17/2017.  Past medical history including metastatic cholangiocarcinoma presenting with acute lower extremity weakness.        Bilateral upper and lower extremity weakness: Etiology unclear.  MRI brain with recent infarcts, however, location would not explain his diffuse weakness.  MRI spine (cervical, thoracic and lumbar) with metastatic lesions at C7, T2 and T12 but negative for cord impingement.  Neurology and neurosurgery consulted and following.    - with concern for Guillan-Eagar, LP obtained demonstrating elevated protein with normal WBC count, EMG pending  - Neuro recs appreciated  - PT/OT  - continue steroids for now, though without signs of cord compression, unclear if this is of any benefit  - reports more movement in all extremities today    Metastatic cholangiocarcinoma:  Follows with [facility].  MRI demonstrating metastatic disease as noted.  Radiation Oncology consulted, radiation treatment to be determined.  - Oncology consult pending  - Rad Onc recs appreciated    Small recent cortical brain infarcts: MRI of the brain showed small areas of restricted diffusion in the cortex of the right frontal lobe.  No brain metastases identified.  Likely embolic in setting of A-fib.  - TTE pending  - obtain TSH, A1C, fasting lipid panel  - continue neuro checks  - permissive HTN  - PT/OT/SLP evaluations    Paroxysmal A-fib:  Family reports history of A-fib diagnosed at prior hospitalization.  Decision was made against anticoagulation due to limited life expectancy despite hypercoagulable state of malignancy.  - continue tele, currently rate controlled without kelsie blocking agents  - metoprolol IV prn  - defer anticoagulation to Neuro    Hypertension: Holding PTA lisinopril and amlodipine for permissive HTN.     Hypokalemia: Patient with low potassium and magnesium on admission.  - electrolyte protocol    Anemia,  macrocytic, thrombocytopenia, pancytopenia: MCV is 105.  White blood cell count and platelet count are also reduced.  Hemoglobin is 10.0 on admission.  - presume secondary to chemotherapy  - WBC and platelet normal today, continue to monitor CBC    DVT Prophylaxis: Pneumatic Compression Devices  Code Status: Full Code    Disposition: Expected discharge in 3 days once work-up complete.    Enrique Anabelle    Interval History  Patient feels strength has improved since yesterday.  Reports sensation of extremity numbness but sensation to light touch is intact.  Denies any facial weakness and denies any dyspnea or dysphagia.  No chest pain/pressure, palpitations.  No other complaints.    -Data reviewed today: I reviewed all new labs and imaging results over the last 24 hours. I personally reviewed no images or EKG's today.    Physical Exam  Temp: 98.1  F (36.7  C) Temp src: Axillary BP: 119/87 mmHg Pulse: 84 Heart Rate: 81 Resp: 17 SpO2: 97 % O2 Device: None (Room air)    Filed Vitals:    01/17/17 1215 01/18/17 0430   Weight: 74.844 kg (165 lb) 76.1 kg (167 lb 12.3 oz)     Vital Signs with Ranges  Temp:  [97.4  F (36.3  C)-98.2  F (36.8  C)] 98.1  F (36.7  C)  Pulse:  [84] 84  Heart Rate:  [] 81  Resp:  [9-25] 17  BP: (119-152)/() 119/87 mmHg  SpO2:  [90 %-98 %] 97 %  I/O last 3 completed shifts:  In: 300 [P.O.:300]  Out: 450 [Urine:450]    Constitutional: Well developed, well nourished male in no acute distress  Respiratory: Clear to auscultation bilaterally, no crackles or wheezes  Cardiovascular: Regular rate and rhythm, S1/S2 without murmur, rubs or gallops  GI: Abdomen soft, non-tender, non-distended, normal bowel sounds  Skin/Integumen: No rash or bruising  Other:  alert and appropriate, cranial nerves grossly intact, marked weakness in all extremities      Medications    - MEDICATION INSTRUCTIONS -         sodium chloride (PF)  3 mL Intracatheter Q8H     docusate sodium  100 mg Oral Daily     multivitamin,  therapeutic  1 tablet Oral Daily     sennosides  1 tablet Oral BID     cholecalciferol  400 Units Oral Daily       Data    Recent Labs  Lab 01/18/17  0510 01/17/17  1228   WBC 4.3 3.7*   HGB 10.7* 10.0*   * 105*    144*   INR  --  1.11    137   POTASSIUM 4.6 3.3*   CHLORIDE 103 102   CO2 25 30   BUN 21 16   CR 1.16 1.17   ANIONGAP 9 5   SERINA 9.1 9.5   * 99   ALBUMIN 3.1* 3.2*   PROTTOTAL 7.3 7.2   BILITOTAL 0.8 0.8   ALKPHOS 243* 242*   ALT 33 28   AST 79* 51*   TROPI 0.070*  --        Recent Results (from the past 24 hour(s))   MR Cervical Spine w/o Contrast    Narrative    MR CERVICAL SPINE W/O CONTRAST   1/17/2017 3:03 PM     HISTORY: bilateral UE/LE weakness    TECHNIQUE:  Multiplanar multisequence MRI of the cervical spine  without gadolinium contrast.     COMPARISON:  None.    FINDINGS: Motion artifact degrades quality of these images.  Infiltrative lesions are seen within the C7 and T2 and T3 vertebral  bodies. This is consistent with metastatic disease to these vertebrae.  There is abnormal soft tissue extending from the T2 vertebral body  into the right ventral epidural space and right T2-T3 neural foramen.  This could affect the right T2 nerve root. There is moderate central  spinal stenosis at C6-7 due to bulging disc and associated  osteophytes. There is mild flattening of the cord at this level due to  the degenerative change.    C2-C3:  Normal disc, facet joints, spinal canal and neural foramina.    C3-C4:  Mild degenerative change in the facet joints.    C4-C5:  Mild annular bulge. Central canal still adequate. Moderate  foraminal stenosis due to uncinate spurs and loss of disc space  height.    C5-C6:  Loss of disc space height. Diffuse annular disc bulge. Central  canal mildly narrowed due to the bulging disc. Moderate bilateral  foraminal stenosis due to uncinate spurs.    C6-C7:  Degeneration of the disc. Diffuse annular disc bulge with  associated posterior osteophytes  leading to moderate central stenosis  and mild flattening of the cord. There is also moderate bilateral  foraminal stenosis at this level due to loss of disc space height and  uncinate spurs.    C7-T1: Degeneration of the disc. Mild annular disc bulge. Moderate  bilateral foraminal stenosis due to uncinate spurs.      Impression    IMPRESSION:    1. Study is consistent with bone metastasis to C7, T2, and T3. There  is a small amount of epidural tumor at the right T2 level which also  extends into the right T2-3 neural foramen. This could affect the  right T2 nerve root. No central stenosis is seen at this level.  2. Multilevel degenerative change. The degenerative changes are  leading to moderate central stenosis at C6-7 with mild flattening of  the cord at this level.  3. There is also moderate foraminal stenosis at multiple levels due to  loss of disc space height and uncinate spurs.    KEILY GARCIA MD   MR Thoracic Spine w/o Contrast    Narrative    MR THORACIC SPINE W/O CONTRAST 1/17/2017 3:04 PM     HISTORY: limited movement of UE/LE    TECHNIQUE: Multiplanar multisequence MRI of the thoracic spine without  gadolinium contrast.    COMPARISON: None.    FINDINGS: Inversion recovery images reveal infiltrative lesions within  the the C7, T2 and T12 vertebral bodies. There is also an infiltrative  process in the T9 lamina and spinous process. These are consistent  with metastatic disease. A very small amount of epidural tumor is seen  at the right T3 2 level causing only slight mass effect on the dural  sac. This is better seen on the MR scan of the cervical spine. There  are multilevel degenerative changes with loss of disc space height  throughout the thoracic spine. Mild bulging discs are seen at T4-5,  C5-6, and there is a small central disc protrusion at T6-7.      Impression    IMPRESSION:    1. Infiltrative lesions within the C7 and T2, and T12 vertebral bodies  and within the T9 lamina and spinous process.  This is consistent with  metastatic disease.  2. Small amount of epidural tumor at the T2 level but no cord  compression or central stenosis.  3. Multilevel degenerative change.    KEILY GARCIA MD   MR Brain w/o Contrast    Narrative    MR BRAIN W/O CONTRAST 1/17/2017 3:04 PM     HISTORY: bilateral UE/LE weakness    TECHNIQUE: Multiplanar, multisequence MRI of the brain without IV  contrast material. No contrast was administered because of the length  of the examination. The patient was becoming uncomfortable.    COMPARISON: None.    FINDINGS: Motion artifact degrades quality of these images.  There is  generalized atrophy of the brain. . There are small areas of  restricted diffusion in the cortex of the right frontal lobe. These  measure about 4 mm in size. These would be compatible with recent  cortical infarcts. No lesions are seen in the cerebellum or left  hemisphere.. . The facial structures appear normal. The arteries at  the base of the brain and the dural venous sinuses appear patent. No  brain metastasis are identified. No mass lesions are seen.      Impression    IMPRESSION:   1. Small areas of restricted diffusion in the cortex of the right  frontal lobe. These are consistent with small recent cortical  infarcts.  2. No brain metastasis are identified. No mass lesions are seen.  3. Generalized atrophy of the brain. .  4. No skull metastasis are identified.     KEILY GARCIA MD   MR Lumbar Spine w/o Contrast    Narrative    MR LUMBAR SPINE WITHOUT CONTRAST1/17/2017 2:59 PM      HISTORY: History of cancer, increasing weakness.    TECHNIQUE:  Multiplanar, multisequence MRI of the lumbar spine without  contrast.     COMPARISON: None.    FINDINGS:This report assumes five lumbar type vertebrae.     The conus and visualized portions of the thoracic spinal cord appear  normal. The paraspinal soft tissues appear normal as visualized. The  bone marrow has normal signal intensity. No bone metastasis are seen  in the  lumbar region. No central spinal stenosis or cord compression.    L1-L2:   Normal disc, facet joints, spinal canal and neural foramina.    L2-L3:  Normal disc, facet joints, spinal canal and neural foramina.    L3-L4:  Degeneration of the disc. Diffuse annular disc bulge. There is  a small more right central disc herniation causing mild mass effect on  the dural sac. Central canal is mildly narrowed due to this right  central disc herniation. This herniation extends slightly inferior to  the level of the disc and posterior to the L4 vertebral body.    L4-L5:  Degeneration of the disks. Loss of disc space height. Diffuse  annular disc bulge. There is disc material extending into the right  and left neural foramen causing moderate bilateral foraminal stenosis.  Mild central stenosis due to the bulging disc and degenerative change  off the facet joints.    L5-S1:  Normal disc, facet joints, spinal canal and neural foramina.      Impression    IMPRESSION:   1. No bone metastasis are seen in the lumbar region.  2. Multilevel degenerative change.  3. Moderate size right central disc herniation at L3-L4 causing mild  mass effect on the dural sac. This could affect the right L5 nerve  root as it arises from the sac. It is also causing mild central  stenosis.  4. Moderate bilateral L4-5 foraminal stenosis due to a bulging disc  and degenerative change off the facet joints. There is also moderate  central stenosis and lateral recess stenosis at this level due to  degenerative change off the facet joints and a bulging disc.    KEILY GARCIA MD   MR Cervical Spine w Contrast    Narrative    MR CERVICAL SPINE WITH CONTRAST 1/17/2017 9:39 PM     HISTORY: Metastatic disease. Evaluation needed with contrast for  treatment purposes.    TECHNIQUE: Postcontrast images were obtained through the cervical  spine with 7 mL of Gadavist.    COMPARISON: MR cervical spine without contrast on same date.    FINDINGS: Postcontrast images show  enhancement of the bone lesions in  the C7 and T2 vertebral bodies consistent with osseous metastases. In  addition, there is a small amount of enhancing epidural tumor at the  T2 level in the right anterolateral epidural space causing some mild  central canal stenosis but no cord deformity. There is also enhancing  soft tissue in the right C6-C7 and C7-T1 neural foramen as well as the  right T2-T3 neural foramen consistent with some tumor. There is also a  small amount of epidural tumor in the right anterior epidural space at  C7. There is no evidence for any intradural tumor any intramedullary  tumor. Degenerative changes are also superimposed most advanced at  C6-C7 where there is moderate central canal and bilateral neural  foraminal stenosis along with mild cord deformity. This is just above  the small amount of epidural tumor at C7.      Impression    IMPRESSION: Osseous metastases at C7 and T1 with some epidural tumor  in the right anterior lateral epidural spaces at these levels as well  as some epidural tumor in the right side of neural foramen at C6-C7,  C7-T1, and T2-T3. The epidural tumor is not causing any cord  impingement at this time. There is moderate facet degenerative central  canal stenosis at C6-C7 with some mild cord deformity.    SO MALONE MD   XR Lumbar Puncture Spinal Tap Diag    Narrative    EXAM: XR LUMBAR PUNCTURE SPINAL TAP DIAGNOSTIC  1/18/2017 9:43 AM       History:  Extremity weakness, rule out Guillain [place].     PROCEDURE: The patient consented for a lumbar puncture. The benefits  and risks of the procedure were explained to the patient, including  but not limited to worsening headache, hemorrhage, infection, lower  extremity pain, or nerve root injury. The patient was sterilely  prepped and draped with the patient in the supine position, over the  lower back. Under fluoroscopic guidance, the interlaminar spaces were  noted. 1% lidocaine was administered for local anesthetic over  the  L2-3 interlaminar space, and a 22 gauge needle was advanced into the  thecal sac under fluoroscopic guidance. There was initial aspiration  of clear CSF. About 8 cc's total were aspirated.  The needle was  removed. There were no immediate complications associated with the  procedure.       Fluoro time: 0.2 minutes.   Number of Images: 2      Impression    IMPRESSION: Successful lumbar puncture.    JUAN NICE PA-C                Progress Notes by Amber White RN at 2017 10:33 AM     Author:  Amber White RN Service:  (none) Author Type:      Filed:  2017 11:20 AM Note Time:  2017 10:33 AM Status:  Addendum    :  Amber White RN ()      Related Notes: Original Note by Amber White RN () filed at 2017 11:19 AM         Call out to Dr. Barriga office to clarify EMG order. Spoke to MD's nurse coordinator and she will call me back.    1118 I received a call from Juni the coordinator with Dr. Huang and she clarified the EMG order, I called Teri back at [facility]  ([phone] option 2) and she has all the information she needs as her techs called the MD and clarified the order.        Progress Notes by Giancarlo Cuevas RN at 2017 10:08 AM     Author:  Giancarlo Cuevas RN Service:  (none) Author Type:  Registered Nurse    Filed:  2017 10:08 AM Note Time:  2017 10:08 AM Status:  Signed    :  Giancarlo Cuevas RN (Registered Nurse)           Writer attempted to call wife x1. No answer at home number       Progress Notes by Juan Nice PA at 2017  9:36 AM     Author:  Juan Nice PA Service:  (none) Author Type:  Physician Assistant    Filed:  2017  9:37 AM Note Time:  2017  9:36 AM Status:  Signed    :  Juan Nice PA (Physician Assistant)           RADIOLOGY PROCEDURE NOTE  Patient name: Rick Teixeira  MRN: [id number]  :  1945    Pre-procedure diagnosis: Weakness  Post-procedure diagnosis: Same    Procedure Date/Time: January 18, 2017  9:36 AM  Procedure: Lumbar Puncture  Estimated blood loss: None  Specimen(s) collected with description: 8 cc clear CSF  The patient tolerated the procedure well with no immediate complications.  Significant findings:none    See imaging dictation for procedural details.    Provider name: Alessandro Nice  Assistant(s):None           Progress Notes by Dolores Clinton RN at 1/18/2017  4:15 AM     Author:  Dolores Clinton RN Service:  (none) Author Type:  Registered Nurse    Filed:  1/18/2017  5:14 AM Note Time:  1/18/2017  4:15 AM Status:  Signed    :  Dolores Clinton RN (Registered Nurse)           Woke patient for 4 am assessment and change linens as he became incontinent of urine. Noted to be very confused and illogical. Extremities continue to be very weak, all other neuro's intact. VSS. RRT called, please see RRT note.              Procedure Notes     No notes of this type exist for this encounter.         Progress Notes - Therapies (Notes from 01/18/17 through 01/21/17)      Progress Notes by DEMETRIS KEYS at 1/20/2017  8:14 PM     Author:  DEMETRIS KEYS Service:  Respiratory Therapy Author Type:  Respiratory Therapist    Filed:  1/20/2017  8:15 PM Note Time:  1/20/2017  8:14 PM Status:  Signed    :  DEMETRIS KEYS RT (Respiratory Therapist)           Patient was able to achieve a NIF -80 and VC 1700 ml with good effort.  Will cont to follow.  1/20/2017  Demetris Keys RRT       Progress Notes by Yanira Peck OT at 1/20/2017 10:24 AM     Author:  Yanira Peck OT Service:  (none) Author Type:  Occupational Therapist    Filed:  1/20/2017  4:24 PM Note Time:  1/20/2017 10:24 AM Status:  Signed    :  Yanira Peck OT (Occupational Therapist)              01/20/17 1000   Quick Adds   Type of Visit Initial Occupational Therapy  Evaluation   Living Environment   Lives With alone   Living Arrangements house   Living Environment Comment plans to discharge to  rehab   Self-Care   Equipment Currently Used at Home none   Functional Level Prior   Ambulation 0-->independent   Transferring 0-->independent   Toileting 0-->independent   Bathing 0-->independent   Dressing 0-->independent   Eating 0-->independent   Communication 0-->understands/communicates without difficulty   Swallowing 0-->swallows foods/liquids without difficulty   Cognition 0 - no cognition issues reported   Fall history within last six months no   Prior Functional Level Comment I at baseline       Present no   General Information   Onset of Illness/Injury or Date of Surgery - Date 01/17/17   Referring Physician Enrique Ahn MD   Patient/Family Goals Statement get stronger   Additional Occupational Profile Info/Pertinent History of Current Problem 71 year old male with metastatic intrahepatic cholangiocarcinoma presenting with sudden onset bilateral upper and lower extremity weakness. 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    Precautions/Limitations fall precautions   Cognitive Status Examination   Orientation orientation to person, place and time   Level of Consciousness alert   Able to Follow Commands WNL/WFL   Visual Perception   Visual Perception Wears glasses   Sensory Examination   Sensory Comments Intact to light touch through UE and LE   Pain Assessment   Patient Currently in Pain Yes, see Vital Sign flowsheet  (R shoulder)   Strength   Strength Comments demonstrates significant diffuse weakness; able to wiggle fingers (soft  with L hand), 2/5 wrist, 1/5 elbow and 3/5 shoulder shrug.   Mobility   Bed Mobility Comments MAX A of 2   Transfer Skills   Transfer Comments Dependent on ceiling lift   Balance   Balance Comments unable to hold self up in sitting or standing   Upper Body Dressing   Level of [place]:  "Dress Upper Body dependent (less than 25% patients effort)   Lower Body Dressing   Level of [place]: Dress Lower Body dependent (less than 25% patients effort)   Toileting   Level of [place]: Toilet dependent (less than 25% patients effort)   Grooming   Level of [place]: Grooming dependent (less than 25% patients effort)   Eating/Self Feeding   Level of [place]: Eating dependent (less than 25% patients effort)   Activities of Daily Living Analysis   Impairments Contributing to Impaired Activities of Daily Living balance impaired;pain;strength decreased;postural control impaired   General Therapy Interventions   Planned Therapy Interventions ADL retraining;balance training;ROM;strengthening   Clinical Impression   Criteria for Skilled Therapeutic Interventions Met yes, treatment indicated   OT Diagnosis Decreased ADL I   Influenced by the following impairments decreased balance, weakness   Assessment of Occupational Performance 5 or more Performance Deficits   Identified Performance Deficits dressing, toileting, bathing, mobility, hygiene, home mgmt   Clinical Decision Making (Complexity) High complexity   Therapy Frequency daily   Predicted Duration of Therapy Intervention (days/wks) 5 days   Anticipated Discharge Disposition Acute Rehabilitation Facility   Risks and Benefits of Treatment have been explained. Yes   Patient, Family & other staff in agreement with plan of care Yes   [facility] TM \"6 Clicks\"   2016, Trustees of [facility], under license to [employer].  All rights reserved.   6 Clicks Short Forms Daily Activity Inpatient Short Form   [facility]  \"6 Clicks\" Daily Activity Inpatient Short Form   1. Putting on and taking off regular lower body clothing? 1 - Total   2. Bathing (including washing, rinsing, drying)? 1 - Total   3. Toileting, which includes using toilet, bedpan or urinal? 1 - Total   4. Putting on and taking off regular upper body " clothing? 1 - Total   5. Taking care of personal grooming such as brushing teeth? 1 - Total   6. Eating meals? 1 - Total   Daily Activity Raw Score (Score out of 24.Lower scores equate to lower levels of function) 6   Total Evaluation Time   Total Evaluation Time (Minutes) 15          Progress Notes by Murray Tenorio RT at 1/20/2017  8:20 AM     Author:  Murray Tenorio RT Service:  Respiratory Therapy Author Type:  Respiratory Therapist    Filed:  1/20/2017  8:21 AM Note Time:  1/20/2017  8:20 AM Status:  Signed    :  Murray Tenorio RT (Respiratory Therapist)           Patient was able to achieve a NIF -80 and VC 2200 ml with good effort.  Will cont to follow.    Murray Tenorio RT        Progress Notes by AMANDA KEYS at 1/19/2017  8:15 PM     Author:  AMANDA KEYS Service:  Respiratory Therapy Author Type:  Respiratory Therapist    Filed:  1/19/2017  8:16 PM Note Time:  1/19/2017  8:15 PM Status:  Signed    :  AMANDA KEYS RT (Respiratory Therapist)           Patient was able to achieve a NIF -80 and VC 2000 ml with good effort.  Will cont to follow.  1/19/2017  Amanda Keys RRT       Progress Notes by Chris Ferrell RT at 1/19/2017  3:25 PM     Author:  Chris Ferrell RT Service:  (none) Author Type:  Respiratory Therapist    Filed:  1/19/2017  3:27 PM Note Time:  1/19/2017  3:25 PM Status:  Signed    :  Chris Ferrell RT (Respiratory Therapist)           Patient was able to achieve on the  NIF -80 and the VC 2100ml.  1/19/2017  Chris Ferrell

## 2017-01-17 NOTE — CONSULTS
"[facility]   Neurosurgery Consult Note         CC: Weakness    Primary care Provider: Clinic, [facility]    Referring provider:   No referring provider defined for this encounter.      Kiana: Rick Teixeira is a 71 year old male that presents to [facility] with a complaint of BUE and BLE weakness over the past 24 hours or so. He has a history of cholangiocarcinoma and lung cancer which he says is \"stage IV and he only has a few months to live\". He reports developing weakness in his arms and legs over the last day or so. He feels the right side is weaker than his left in both his arms and legs. He has not ambulated in the last day or so. He has no radicular pain.      Past Medical History   Diagnosis Date     Hypertension      Cancer (H)      liver and lung       Past Surgical History   Procedure Laterality Date     Genitourinary surgery       testicular cancer       No current facility-administered medications for this encounter.     Current Outpatient Prescriptions   Medication     AMLODIPINE BESYLATE PO     LISINOPRIL PO       Allergies   Allergen Reactions     Ancef [Cefazolin] Other (See Comments)     Chest heaviness     Penicillins Unknown       Social History     Social History     Marital Status:      Spouse Name: N/A     Number of Children: N/A     Years of Education: N/A     Social History Main Topics     Smoking status: Former Smoker     Smokeless tobacco: None     Alcohol Use: No     Drug Use: No     Sexual Activity: Not Asked     Other Topics Concern     None     Social History Narrative     None       No family history on file.      Review Of Systems  Skin: negative  Eyes: negative  Ears/Nose/Throat: negative  Respiratory: No shortness of breath, dyspnea on exertion, cough, or hemoptysis  Cardiovascular: negative  Gastrointestinal: negative  Genitourinary: negative  Musculoskeletal: as above  Neurologic: as above  Psychiatric: " negative  Hematologic/Lymphatic/Immunologic: negative  Endocrine: negative    B/P: 133/101, T: 98.2, P: 84, R: 14    Examination:  Awake  Alert  Oriented x 3  Speech clear  Cranial nerves II - XII intact  Face symmetric  Tongue midline  Neck nontender  Normal ROM of neck  Motor exam - 3/5 RUE, 2/5 LUE, 3/5 BLE, DF 4-/5 BLE, PF 4/5 BLE (? Effort)  Sensation with nondermatomal sensory loss  Clonus negative  DTR 1+ bilateral pattellar tendon  Williamson's sign negative  Spurling's sign negative  Ambulation no examined    Imaging:   MRI brain - small cortical restricted diffusion in the right frontal lobe  MRI thoracic spine - T2, T9 and T12 metastatic lesions without canal stenosis. He has a very small amount of epidural tumor at T2 without cord compression  MRI lumbar spine - mild stenosis at L3-4 on the right secondary to disk bulge and bilateral moderate L4-5 stenosis  MRI cervical spine -  C7 metastatic disease in the vertebral bodies with C6-7 moderate stenosis without myelomalacia       Assessment/Plan:   I am not sure that the cervical stenosis could cause his symptoms, his stenosis at C6-7 is moderate at best and there is no clear myelomalacia. He informed me that he is not interested in surgical intervention since he is stage IV and was told he only has a few months to live. Also, I do not think surgery would resolve this weakness. I have recommended that he have IV steroids, a MRI cervical spine with contrast and Radiation Oncology consult. Will follow up in am.        Lei Starks MD, MS, FAANS  Neurosurgeon  [facility]  [facility]  [address], [address]  [place]  [address]  Tel [phone]

## 2017-01-17 NOTE — IP AVS SNAPSHOT
"          [facility] [address]: [phone]                                              INTERAGENCY TRANSFER FORM - LAB / IMAGING / EKG / EMG RESULTS   2017                    Hospital Admission Date: 2017  RODNEY MARIE   : 1945  Sex: Male        Attending Provider: Henry Pantoja DO     Allergies:  Ancef, Penicillins    Infection:  None   Service:  HOSPITALIST    Ht:  1.778 m (5' 10\")   Wt:  77 kg (169 lb 12.1 oz)   Admission Wt:  74.844 kg (165 lb)    BMI:  24.36 kg/m 2   BSA:  1.95 m 2            Patient PCP Information     Provider PCP Type    [facility]         Lab Results - 3 Days (17 - 17)      Magnesium [[id number]]  Resulted: 17 0858, Result status: Final result    Ordering provider: Chico Brown MD  17 0000 Resulting lab: [facility]    Specimen Information    Type Source Collected On   Blood  17 0811          Components       Value Reference Range Flag Lab   Magnesium 1.8 1.6 - 2.3 mg/dL  FrStHsLb            Magnesium [[id number]]  Resulted: 17 2037, Result status: Final result    Ordering provider: Chico Brown MD  17 1449 Resulting lab: [facility]    Specimen Information    Type Source Collected On   Blood  17 2005          Components       Value Reference Range Flag Lab   Magnesium 2.2 1.6 - 2.3 mg/dL  FrStHsLb            Phosphorus [[id number]]  Resulted: 17 1056, Result status: Final result    Ordering provider: Enrique Ahn MD  17 0540 Resulting lab: [facility]    Specimen Information    Type Source Collected On     17 0540          Components       Value Reference Range Flag Lab   Phosphorus 2.8 2.5 - 4.5 mg/dL  FrStHsLb            Magnesium [[id number]] (Abnormal)  Resulted: 17 0727, Result status: Final result    Ordering provider: Enrique Ahn MD  17 0540 " Resulting lab: [facility]    Specimen Information    Type Source Collected On     01/20/17 0540          Components       Value Reference Range Flag Lab   Magnesium 1.4 1.6 - 2.3 mg/dL L FrStHsLb            Basic metabolic panel [[id number]] (Abnormal)  Resulted: 01/20/17 0559, Result status: Final result    Ordering provider: Enrique Ahn MD  01/20/17 0000 Resulting lab: [facility]    Specimen Information    Type Source Collected On   Blood  01/20/17 0540          Components       Value Reference Range Flag Lab   Sodium 139 133 - 144 mmol/L  FrStHsLb   Potassium 4.6 3.4 - 5.3 mmol/L  FrStHsLb   Chloride 109 94 - 109 mmol/L  FrStHsLb   Carbon Dioxide 20 20 - 32 mmol/L  FrStHsLb   Anion Gap 10 3 - 14 mmol/L  FrStHsLb   Glucose 104 70 - 99 mg/dL H FrStHsLb   Urea Nitrogen 23 7 - 30 mg/dL  FrStHsLb   Creatinine 1.13 0.66 - 1.25 mg/dL  FrStHsLb   GFR Estimate 64 >60 mL/min/1.7m2  FrStHsLb   Comment:  Non  GFR Calc   GFR Estimate If Black 78 >60 mL/min/1.7m2  FrStHsLb   Comment:  African American GFR Calc   Calcium 8.2 8.5 - 10.1 mg/dL L FrStHsLb            CBC with platelets [[id number]] (Abnormal)  Resulted: 01/20/17 0546, Result status: Final result    Ordering provider: Enrique Ahn MD  01/20/17 0000 Resulting lab: [facility]    Specimen Information    Type Source Collected On   Blood  01/20/17 0540          Components       Value Reference Range Flag Lab   WBC 5.9 4.0 - 11.0 10e9/L  FrStHsLb   RBC Count 3.16 4.4 - 5.9 10e12/L L FrStHsLb   Hemoglobin 11.0 13.3 - 17.7 g/dL L FrStHsLb   Hematocrit 32.9 40.0 - 53.0 % L FrStHsLb    78 - 100 fl H FrStHsLb   MCH 34.8 26.5 - 33.0 pg H FrStHsLb   MCHC 33.4 31.5 - 36.5 g/dL  FrStHsLb   RDW 16.7 10.0 - 15.0 % H FrStHsLb   Platelet Count 112 150 - 450 10e9/L L FrStHsLb            Paraneoplastic antibody [[id number]]  Resulted: 01/19/17 1536, Result status: In process    Ordering provider: Abrahan,  Patsy CANCINO MD  01/19/17 1455 Resulting lab: [facility]    Specimen Information    Type Source Collected On   Blood  01/19/17 1525            Cytology Non Gyn [[id number]]  Resulted: 01/19/17 1213, Result status: Final result    Ordering provider: Patsy Gauthier MD  01/17/17 1751 Resulting lab: [facility]    Specimen Information    Type Source Collected On   Cytology  01/18/17 0934          Components       Value Reference Range Flag Lab   Copath Report --      Result:         Patient Name: RODNEY MARIE  MR#: [id number]  Specimen #: [id number]  Collected: 1/18/2017  Received: 1/18/2017  Reported: 1/19/2017 12:12  Ordering Phy(s): PATSY GAUTHIER    For improved result formatting, select 'View Enhanced Report Format',  under Linked Documents section.    SPECIMEN/STAIN PROCESS:  Cerebrospinal Fluid       Pap-Cyto x 1, Ramires's stain-cyto x 1    ----------------------------------------------------------------    CYTOLOGIC INTERPRETATION:     Cerebrospinal Fluid:   Negative for Malignancy  See description:  Specimen Adequacy: Satisfactory for evaluation.    Electronically signed out by:    Holly Jay M.D.    Processed and screened at [facility],  [facility]    CLINICAL HISTORY:    ,    GROSS:  Cerebrospinal Fluid:  Received 3 ml of colorless, clear fluid, processed  as 1 Pap stained cytospin and 1 Ramires stained cytospin.    MICROSCOPIC:    The specimen is scantily cellular and consists of scant mat ure  lymphocytes and monocytes.  Numerous red cells are present in the  background, possibly from a traumatic tap.  Specimen is negative for  infectious organisms or malignancy..    CPT Codes:  A: 65555-PIV, 70586-ILRSEUM    TESTING LAB LOCATION:  [address]  [address] [phone]    COLLECTION SITE:  Client:  [facility]  Location:  [facility] (S)              Comprehensive metabolic panel  [[id number]] (Abnormal)  Resulted: 01/19/17 0744, Result status: Final result    Ordering provider: Enrique Ahn MD  01/19/17 0000 Resulting lab: [facility]    Specimen Information    Type Source Collected On   Blood  01/19/17 0715          Components       Value Reference Range Flag Lab   Sodium 138 133 - 144 mmol/L  FrStHsLb   Potassium 3.9 3.4 - 5.3 mmol/L  FrStHsLb   Chloride 105 94 - 109 mmol/L  FrStHsLb   Carbon Dioxide 25 20 - 32 mmol/L  FrStHsLb   Anion Gap 8 3 - 14 mmol/L  FrStHsLb   Glucose 112 70 - 99 mg/dL H FrStHsLb   Urea Nitrogen 25 7 - 30 mg/dL  FrStHsLb   Creatinine 1.15 0.66 - 1.25 mg/dL  FrStHsLb   GFR Estimate 63 >60 mL/min/1.7m2  FrStHsLb   Comment:  Non  GFR Calc   GFR Estimate If Black 76 >60 mL/min/1.7m2  FrStHsLb   Comment:  African American GFR Calc   Calcium 8.6 8.5 - 10.1 mg/dL  FrStHsLb   Bilirubin Total 0.5 0.2 - 1.3 mg/dL  FrStHsLb   Albumin 2.9 3.4 - 5.0 g/dL L FrStHsLb   Protein Total 6.7 6.8 - 8.8 g/dL L FrStHsLb   Alkaline Phosphatase 237 40 - 150 U/L H FrStHsLb   ALT 45 0 - 70 U/L  FrStHsLb   AST 84 0 - 45 U/L H FrStHsLb            INR [[id number]] (Abnormal)  Resulted: 01/19/17 0738, Result status: Final result    Ordering provider: Michael Lopez MD  01/19/17 0000 Resulting lab: [facility]    Specimen Information    Type Source Collected On   Blood  01/19/17 0715          Components       Value Reference Range Flag Lab   INR 1.15 0.86 - 1.14  H FrStHsLb            CBC with platelets [[id number]] (Abnormal)  Resulted: 01/19/17 0728, Result status: Final result    Ordering provider: Enrique Ahn MD  01/19/17 0000 Resulting lab: [facility]    Specimen Information    Type Source Collected On   Blood  01/19/17 0715          Components       Value Reference Range Flag Lab   WBC 5.6 4.0 - 11.0 10e9/L  FrStHsLb   RBC Count 2.91 4.4 - 5.9 10e12/L L FrStHsLb   Hemoglobin 10.0 13.3 - 17.7 g/dL L FrStHsLb    Hematocrit 30.3 40.0 - 53.0 % L FrStHsLb    78 - 100 fl H FrStHsLb   MCH 34.4 26.5 - 33.0 pg H FrStHsLb   MCHC 33.0 31.5 - 36.5 g/dL  FrStHsLb   RDW 17.1 10.0 - 15.0 % H FrStHsLb   Platelet Count 135 150 - 450 10e9/L L FrStHsLb            Folate [[id number]]  Resulted: 01/19/17 0205, Result status: Final result    Ordering provider: Patsy Gauthier MD  01/18/17 1447 Resulting lab: [facility]    Specimen Information    Type Source Collected On   Blood  01/18/17 1858          Components       Value Reference Range Flag Lab   Folate 17.7 >5.4 ng/mL  51            Vitamin B12 [[id number]] (Abnormal)  Resulted: 01/19/17 0045, Result status: Final result    Ordering provider: Patsy Gauthier MD  01/18/17 1447 Resulting lab: [facility]    Specimen Information    Type Source Collected On   Blood  01/18/17 1858          Components       Value Reference Range Flag Lab   Vitamin B12 >6000 193 - 986 pg/mL H 51            Vitamin D Deficiency [[id number]] (Abnormal)  Resulted: 01/18/17 1943, Result status: Final result    Ordering provider: Henry Pantoja DO  01/18/17 0510 Resulting lab: [facility]    Specimen Information    Type Source Collected On     01/18/17 0510          Components       Value Reference Range Flag Lab   Vitamin D Deficiency screening 82 20 - 75 ug/L H 51   Comment:         Season, race, dietary intake, and treatment affect the concentration of   25-hydroxy-Vitamin D. Values may decrease during winter months and increase   during summer months. Values 20-29 ug/L may indicate Vitamin D insufficiency   and values <20 ug/L may indicate Vitamin D deficiency.   Vitamin D determination is routinely performed by an immunoassay specific for   25 hydroxyvitamin D3.  If an individual is on vitamin D2 (ergocalciferol)   supplementation, please specify 25 OH vitamin D2 and D3 level  determination   by   LCMSMS test VITD23.              Folate [[id number]]  Resulted: 01/18/17 1633, Result status: In process    Ordering provider: Henry Pantoja, DO  01/18/17 0510 Resulting lab: [facility]    Specimen Information    Type Source Collected On     01/18/17 0510            Vitamin B12 [[id number]]  Resulted: 01/18/17 1633, Result status: In process    Ordering provider: Henry Pantoja,   01/18/17 0510 Resulting lab: [facility]    Specimen Information    Type Source Collected On     01/18/17 0510            TSH with free T4 reflex [[id number]]  Resulted: 01/18/17 1302, Result status: Final result    Ordering provider: Henry Pantoja,   01/18/17 0510 Resulting lab: [facility]    Specimen Information    Type Source Collected On     01/18/17 0510          Components       Value Reference Range Flag Lab   TSH 0.81 0.40 - 4.00 mU/L  FrStHsLb            Lipid panel reflex to direct LDL [[id number]] (Abnormal)  Resulted: 01/18/17 1258, Result status: Final result    Ordering provider: Henry Pantoja,   01/18/17 0510 Resulting lab: [facility]    Specimen Information    Type Source Collected On     01/18/17 0510          Components       Value Reference Range Flag Lab   Cholesterol 197 <200 mg/dL  FrStHsLb   Triglycerides 131 <150 mg/dL  FrStHsLb   HDL Cholesterol 54 >39 mg/dL  FrStHsLb   LDL Cholesterol Calculated 117 <100 mg/dL H FrStHsLb   Comment:         Above desirable:  100-129 mg/dl   Borderline High:  130-159 mg/dL   High:             160-189 mg/dL   Very high:       >189 mg/dl     Non HDL Cholesterol 143 <130 mg/dL H FrStHsLb   Comment:         Above Desirable:  130-159 mg/dl   Borderline high:  160-189 mg/dl   High:             190-219 mg/dl   Very high:       >219 mg/dl              Troponin I [[id number]] (Abnormal)  Resulted: 01/18/17 1258, Result status: Final result    Ordering provider: Henry Pantoja,   01/18/17 0510 Resulting lab:  [facility]    Specimen Information    Type Source Collected On     01/18/17 0510          Components       Value Reference Range Flag Lab   Troponin I ES 0.070 0.000 - 0.045 ug/L H FrStHsLb   Comment:         The 99th percentile for upper reference range is 0.045 ug/L.  Troponin values   in   the range of 0.045 - 0.120 ug/L may be associated with risks of adverse   clinical events.              Hemoglobin A1c [[id number]]  Resulted: 01/18/17 1258, Result status: Final result    Ordering provider: Henry Pantoja DO  01/18/17 0510 Resulting lab: [facility]    Specimen Information    Type Source Collected On     01/18/17 0510          Components       Value Reference Range Flag Lab   Hemoglobin A1C 4.8 4.3 - 6.0 %  FrStHsLb            Glucose CSF: Tube 1 [[id number]]  Resulted: 01/18/17 1049, Result status: Final result    Ordering provider: Patsy Gauthier MD  01/17/17 1751 Resulting lab: [facility]    Specimen Information    Type Source Collected On   CSF Cerebral spinal fluid 01/18/17 0935          Components       Value Reference Range Flag Lab   Glucose CSF 69 40 - 70 mg/dL  FrStHsLb   Comment:  CSF glucose concentrations are about 60 percent of normal plasma glucose.            Protein total CSF: Tube 1 [[id number]] (Abnormal)  Resulted: 01/18/17 1049, Result status: Final result    Ordering provider: Patsy Gauthier MD  01/17/17 1751 Resulting lab: [facility]    Specimen Information    Type Source Collected On   CSF Cerebral spinal fluid 01/18/17 0935          Components       Value Reference Range Flag Lab   Protein Total CSF 85 15 - 60 mg/dL H FrStHsLb            Cell count with differential CSF: Tube 4 [[id number]] (Abnormal)  Resulted: 01/18/17 1033, Result status: Edited Result - FINAL    Ordering provider: Patsy Gauthier MD  01/17/17 1751 Resulting lab: [facility]    Specimen  Information    Type Source Collected On   CSF Cerebral spinal fluid 01/18/17 0935          Components       Value Reference Range Flag Lab   WBC CSF 0 0 - 5 /uL  FrStHsLb   RBC CSF 21 0 - 2 /uL H FrStHsLb   Tube Number 4 #  FrStHsLb   Color CSF Colorless CLRL   FrStHsLb   Appearance CSF Clear CLER   FrStHsLb            Magnesium [[id number]] (Abnormal)  Resulted: 01/18/17 0537, Result status: Final result    Ordering provider: Henry Pantoja DO  01/18/17 0510 Resulting lab: [facility]    Specimen Information    Type Source Collected On     01/18/17 0510          Components       Value Reference Range Flag Lab   Magnesium 2.5 1.6 - 2.3 mg/dL H FrStHsLb            Comprehensive metabolic panel [[id number]] (Abnormal)  Resulted: 01/18/17 0537, Result status: Final result    Ordering provider: Henry Pantoja DO  01/18/17 0000 Resulting lab: [facility]    Specimen Information    Type Source Collected On   Blood  01/18/17 0510          Components       Value Reference Range Flag Lab   Sodium 137 133 - 144 mmol/L  FrStHsLb   Potassium 4.6 3.4 - 5.3 mmol/L  FrStHsLb   Chloride 103 94 - 109 mmol/L  FrStHsLb   Carbon Dioxide 25 20 - 32 mmol/L  FrStHsLb   Anion Gap 9 3 - 14 mmol/L  FrStHsLb   Glucose 185 70 - 99 mg/dL H FrStHsLb   Urea Nitrogen 21 7 - 30 mg/dL  FrStHsLb   Creatinine 1.16 0.66 - 1.25 mg/dL  FrStHsLb   GFR Estimate 62 >60 mL/min/1.7m2  FrStHsLb   Comment:  Non  GFR Calc   GFR Estimate If Black 75 >60 mL/min/1.7m2  FrStHsLb   Comment:  African American GFR Calc   Calcium 9.1 8.5 - 10.1 mg/dL  FrStHsLb   Bilirubin Total 0.8 0.2 - 1.3 mg/dL  FrStHsLb   Albumin 3.1 3.4 - 5.0 g/dL L FrStHsLb   Protein Total 7.3 6.8 - 8.8 g/dL  FrStHsLb   Alkaline Phosphatase 243 40 - 150 U/L H FrStHsLb   ALT 33 0 - 70 U/L  FrStHsLb   AST 79 0 - 45 U/L H FrStHsLb            CBC with platelets [[id number]] (Abnormal)  Resulted: 01/18/17 0516, Result status: Final result     "Ordering provider: Kit Henry DO Claudia  01/18/17 0000 Resulting lab: [facility]    Specimen Information    Type Source Collected On   Blood  01/18/17 0510          Components       Value Reference Range Flag Lab   WBC 4.3 4.0 - 11.0 10e9/L  FrStHsLb   RBC Count 3.06 4.4 - 5.9 10e12/L L FrStHsLb   Hemoglobin 10.7 13.3 - 17.7 g/dL L FrStHsLb   Hematocrit 31.6 40.0 - 53.0 % L FrStHsLb    78 - 100 fl H FrStHsLb   MCH 35.0 26.5 - 33.0 pg H FrStHsLb   MCHC 33.9 31.5 - 36.5 g/dL  FrStHsLb   RDW 16.5 10.0 - 15.0 % H FrStHsLb   Platelet Count 155 150 - 450 10e9/L  FrStHsLb            Testing Performed By     Lab - Abbreviation Name Director Address Valid Date Range    14 - FrStHsLb [facility] Unknown [address] [place] [address] 05/08/15 1057 - Present    45 - [id number] [facility] Unknown Unknown 01/28/02 0000 - Present    51 - Unknown [facility] Unknown [address] [facility] [address] 12/31/14 1010 - Present    88 - Unknown [facility] Unknown Unknown 10/30/02 0000 - Present            Unresulted Labs (24h ago through future)    Start       Ordered    01/22/17 0600  Platelet count   AM DRAW,   Routine     Comments:  If no result is listed, this lab has not been done the past 365 days. LATEST LAB RESULT: PLATELET COUNT (10e9/L)       Date                     Value                 01/20/2017               112*             ----------    01/21/17 0833    Unscheduled  Potassium -  (Potassium Replacement - \"Standard\" - For K levels less than 3.4 mmol/L - UU,UR,UA,RH,SH,PH,WY )   CONDITIONAL (SPECIFY),   Routine     Comments:  Obtain Potassium Level for these conditions:  *IF no potassium result within 24 hours before initiation of order set, draw potassium level with next lab collect.    *2 HOURS AFTER last IV potassium replacement dose and 4 hours after an oral replacement dose.  *Next morning after potassium dose.     Repeat Potassium Replacement if " "necessary.    01/17/17 2029    Unscheduled  Magnesium -  (Magnesium Replacement -  Adult - \"Standard\" - Replacement for all levels less than 1.6 mg/dL )   CONDITIONAL (SPECIFY),   Routine     Comments:  Obtain Magnesium Level for these conditions:  *IF no magnesium result within 24 hrs before initiation of order set, draw magnesium level with next lab collect.    *2 HOURS AFTER last magnesium replacement dose when magnesium replacement given for level less than 1.6   *Next morning after magnesium dose.     Repeat Magnesium Replacement if necessary.    01/17/17 2029    Unscheduled  Blood gas blood with oxy   CONDITIONAL X 3,   Routine     Comments:  Release order if patient in respiratory distress and Notify Provider.    01/17/17 2029    Unscheduled  Glucose   CONDITIONAL X 3,   Routine     Comments:  Release order if patient experiencing hyperglycemia or hypoglycemia symptoms and Notify Provider.    01/17/17 2029    Unscheduled  Hemoglobin   CONDITIONAL X 3,   Routine     Comments:  Release order if patient experiencing active bleeding and/or hypotension and Notify Provider.    01/17/17 2029    Unscheduled  Magnesium -  (Magnesium Replacement -  Adult - \"Standard\" - Replacement for all levels less than 1.6 mg/dL )   CONDITIONAL (SPECIFY),   Routine     Comments:  Obtain Magnesium Level for these conditions:  *IF no magnesium result within 24 hrs before initiation of order set, draw magnesium level with next lab collect.    *2 HOURS AFTER last magnesium replacement dose when magnesium replacement given for level less than 1.6   *Next morning after magnesium dose.     Repeat Magnesium Replacement if necessary.    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    FINDINGS: The procedure and risks were explained to the patient in  detail and informed consent was obtained. Maximal Sterile Barrier  Technique Utilized: Cap AND mask AND sterile gown AND sterile gloves  AND sterile full body drape AND hand hygiene AND skin preparation 2%  chlorhexidine for cutaneous antisepsis (or acceptable alternative  antiseptics).       Sterile Ultrasound Technique Utilized ?Sterile gel AND sterile probe  covers. The patient was prepped and draped, and 1% lidocaine was used  as local anesthetic. Ultrasound was used to document location and  patency of the right internal jugular vein, and a permanent image was  saved. Under sterile ultrasound guidance, a puncture was made into the  right internal jugular vein, and a 5 Green[place]ic dilator was advanced over  a wire into the right atrium. A subcutaneous tunnel was then created  along the right anterior chest wall to the internal jugular access  site. A Bard EndoShape 14.5 Green[place]ic 19 cm tip to cuff pheresis  catheter was then advanced through the tunnel. The dilator was then  exchanged for a peel-away sheath, and pheresis catheter was advanced  through the peel-away sheath into the right atrium. The peel-away  sheath was then removed. Both ports of the pheresis catheter were  aspirated and flushed and demonstrated good function and was  subsequently hep-locked. The catheter was sutured to the skin site  with 2-0 Ethilon. SecureSeal adhesive was applied to the neck incision  site.    A permanent fluoroscopic image was obtained documenting the tip of the  pheresis catheter in the upper right atrium. There were no immediate  complications.    Fluoroscopy time: 0.1 minutes.    Total fluoroscopy dose: 1  mGy.    Local anesthetic: 17 mL 1% lidocaine.    Conscious sedation: 1 mg IV Versed, 25 mg IV fentanyl.     Sedation time: 20 minutes.    The patient was monitored by radiology nursing staff under my  supervision and remained stable throughout the study.      Impression:       IMPRESSION: Successful right tunneled pheresis catheter placement.    MAGGY ROBISON MD    Specimen Information    Type Source Collected On                  CT Head Neck Angio w/o & w Contrast [[id number]]  Resulted: 01/18/17 2016, Result status: Final result    Ordering provider: Patsy Gauthier MD  01/18/17 1537 Resulted by: Archie Nogueira MD    Performed: 01/18/17 1705 - 01/18/17 1808 Resulting lab: RADIOLOGY RESULTS    Narrative:       CT ANGIOGRAM OF THE HEAD AND NECK WITHOUT AND WITH CONTRAST  1/18/2017  6:08 PM     HISTORY: Admitted yesterday for generalized weakness. Evaluate for  stroke. Small infarcts in the cortex of the right frontal lobe on  yesterday's MRI. Known osseous metastases and C7-T1 epidural tumor.    TECHNIQUE:  Precontrast localizing scans were followed by CT  angiography with an injection of 70 mL Isovue-370 IV with scans  through the head and neck.  Images were transferred to a separate 3-D  workstation where multiplanar reformations and 3-D images were  created.  Estimates of carotid stenoses are made relative to the  distal internal carotid artery diameters except as noted. Radiation  dose for this scan was reduced using automated exposure control,  adjustment of the mA and/or kV according to patient size, or iterative  reconstruction technique.    COMPARISON: MRI yesterday.    CT HEAD FINDINGS:  No contrast enhancing lesions.  Cerebral blood flow  is grossly normal.    CT ANGIOGRAM HEAD FINDINGS:  Arteries are widely patent with no  aneurysm, significant stenosis, occlusion or intraarterial thrombus.  Venous circulation is unremarkable.    CT ANGIOGRAM NECK FINDINGS:   Right carotid artery: No  significant stenosis.      Left carotid artery: Minimal calcified plaque.  No significant  stenosis.      Vertebral arteries: No significant stenosis.      Other findings: There is a dominant poorly circumscribed nodule in the  right upper lobe about 2 cm diameter. There are also numerous much  smaller peripheral nodules in the upper lobes. The appearance is  suggestive of metastatic disease. Destructive lesion is seen in the C7  vertebral body.      Impression:       IMPRESSION:   1. Minimal plaque in the proximal left internal carotid artery.  2. Otherwise normal CT angiogram of the head and neck.  3. Bone and lung metastases.    CANELO CHOI MD    Specimen Information    Type Source Collected On                  XR Lumbar Puncture Spinal Tap Diag [[id number]]  Resulted: 01/18/17 1348, Result status: Final result    Ordering provider: Henry Pantoja DO  01/18/17 0726 Resulted by: Alessandro Nice PA    Performed: 01/18/17 0922 - 01/18/17 0943 Resulting lab: RADIOLOGY RESULTS    Narrative:       EXAM: XR LUMBAR PUNCTURE SPINAL TAP DIAGNOSTIC  1/18/2017 9:43 AM       History:  Extremity weakness, rule out Guillain [place].     PROCEDURE: The patient consented for a lumbar puncture. The benefits  and risks of the procedure were explained to the patient, including  but not limited to worsening headache, hemorrhage, infection, lower  extremity pain, or nerve root injury. The patient was sterilely  prepped and draped with the patient in the supine position, over the  lower back. Under fluoroscopic guidance, the interlaminar spaces were  noted. 1% lidocaine was administered for local anesthetic over the  L2-3 interlaminar space, and a 22 gauge needle was advanced into the  thecal sac under fluoroscopic guidance. There was initial aspiration  of clear CSF. About 8 cc's total were aspirated.  The needle was  removed. There were no immediate complications associated with the  procedure.       Fluoro time: 0.2  minutes.   Number of Images: 2      Impression:       IMPRESSION: Successful lumbar puncture.    JUAN NGUYEN PA-C    Specimen Information    Type Source Collected On                  Testing Performed By     Lab - Abbreviation Name Director Address Valid Date Range    104 - Rad Rslts RADIOLOGY RESULTS Unknown Unknown 05 1553 - Present               ECG/EMG Results (17 - 17)      Echo Bubble Study with Lumason [[id number]]  Resulted: 17 1556, Result status: Final result    Ordering provider: Enrique Ahn MD  17 1221 Resulted by: Yakov Hannon MD    Performed: 17 1528 - 17 1529 Resulting lab: RADIOLOGY RESULTS    Narrative:       Interpretation Summary                    [facility]  Echocardiography Laboratory  [address] [address]        Name: RODNEY MARIE  MRN: [id number]  : 1945  Study Date: 2017 02:46 PM  Age: 71 yrs  Gender: Male  Patient Location: [facility]  Reason For Study: Cerebrovascular Incident  Ordering Physician: ENRIQUE AHN  Referring Physician: [facility]  Performed By: Sulma Roberson RDCS     BSA: 1.9 m2  Height: 70 in  Weight: 167 lb  HR: 89  BP: 125/97 mmHg  ______________________________________________________________________________        Procedure  Complete Portable Bubble Echo Adult. Contrast Lumason.  ______________________________________________________________________________     Interpretation Summary     Left ventricular systolic function is borderline reduced.The left ventricle  visual ejection fraction is estimated at 50-55%.  The right ventricular systolic function is normal.  A contrast injection (Bubble Study) was performed that was negative for flow  across the interatrial septum.  Trivial to small pericardial effusion.  The rhythm was atrial fibrillation.     ______________________________________________________________________________           Left  Ventricle  The left ventricle is normal in size. There is normal left ventricular wall  thickness. The visual ejection fraction is estimated at 50-55%. Left  ventricular systolic function is borderline reduced. There is borderline-mild  global hypokinesia of the left ventricle.     Right Ventricle  The right ventricle is normal size. The right ventricular systolic function  is normal.  Atria  The left atrium is mildly dilated. The right atrium is mildly dilated. There  is no color Doppler evidence of an atrial shunt. A contrast injection (Bubble  Study) was performed that was negative for flow across the interatrial  septum.     Mitral Valve  There is trace mitral regurgitation.     Tricuspid Valve  Right ventricular systolic pressure could not be approximated due to  inadequate tricuspid regurgitation.     Aortic Valve  The aortic valve is trileaflet. There is trace aortic regurgitation. No  aortic stenosis is present.     Pulmonic Valve  The pulmonic valve is not well visualized. There is no pulmonic valvular  stenosis.     Vessels  The aortic root is normal size. Normal size ascending aorta. The IVC is  normal in size and reactivity with respiration, suggesting normal central  venous pressure.  Pericardium  trivial to small pericardial effusion.     Rhythm  The rhythm was atrial fibrillation.     ______________________________________________________________________________  MMode/2D Measurements & Calculations  IVSd: 1.1 cm  LVIDd: 4.8 cm  LVIDs: 3.7 cm  LVPWd: 1.1 cm  FS: 23.6 %  EDV(Teich): 109.2 ml  ESV(Teich): 57.8 ml  LV mass(C)d: 192.9 grams  Ao root diam: 3.6 cm  LA dimension: 4.2 cm  asc Aorta Diam: 3.6 cm  LA/Ao: 1.2  LVOT diam: 2.3 cm  LVOT area: 4.3 cm2  LA Volume (BP): 56.8 ml     LA Volume Index (BP): 29.4 ml/m2        Doppler Measurements & Calculations  MV E max caden: 116.5 cm/sec  MV P1/2t max caden: 113.2 cm/sec  MV P1/2t: 42.9 msec  MVA(P1/2t): 5.1 cm2  MV dec slope: 774.0 cm/sec2  MV dec time:  0.14 sec              ______________________________________________________________________________        Report approved by: Reginaldo Benitez 01/18/2017 03:56 PM       1    Type Source Collected On     01/18/17 1446                  Encounter-Level Documents:     There are no encounter-level documents.      Order-Level Documents:     There are no order-level documents.

## 2017-01-17 NOTE — ED NOTES
"[facility]  ED Nurse Handoff Report    ED Chief complaint: Generalized Weakness      ED Diagnosis:   Final diagnoses:   Leg weakness, bilateral       Code Status: Full Code    Allergies:   Allergies   Allergen Reactions     Ancef [Cefazolin] Other (See Comments)     Chest heaviness     Penicillins Unknown       Activity level:  Total Care     Needed?: No    Isolation: hx lung/liver cancer, currently doing chemo, pt would like a private room if possible  Infection: current chemo pt    Bariatric?: No      Vital Signs:   Filed Vitals:    01/17/17 1215   BP: 137/100   Pulse: 100   Temp: 98.2  F (36.8  C)   TempSrc: Oral   Resp: 18   Height: 1.778 m (5' 10\")   Weight: 74.844 kg (165 lb)   SpO2: 100%       Cardiac Rhythm: ,        Pain level:      Is this patient confused?: No    Patient Report: Rick comes to the ER today by ems to eval for weakness that started yest and has gotten much worse to the point he couldn't get out of bed today. He has strong grasps but significant weakness in both arms and legs. 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 He was given IV magnesium and po potassium to bring those levels up. Neuro has assessed him and is conisdering michelle forte, his niff was low at -44.    Family Comments: son at bedside    OBS brochure/video discussed/provided to patient: No    ED Medications:   Medications   magnesium sulfate 2 g in NS intermittent infusion (PharMEDium or [facility] Cmpd) (0 g Intravenous Stopped 1/17/17 1555)   potassium chloride SA (K-DUR/KLOR-CON M) CR tablet 20 mEq (20 mEq Oral Given 1/17/17 1527)   morphine (PF) (ASTRAMORPH/DURAMORPH) injection 2 mg (2 mg Intravenous Given 1/17/17 1557)   ondansetron (ZOFRAN) injection 4 mg (4 mg Intravenous Given 1/17/17 1555)       Drips infusing?:  No      ED NURSE PHONE NUMBER: [phone]           "

## 2017-01-17 NOTE — IP AVS SNAPSHOT
[address] [facility]    [address]    [place] [address]    Phone:  [phone]                                       After Visit Summary   1/17/2017    Rick Teixeira    MRN: [id number]           After Visit Summary Signature Page     I have received my discharge instructions, and my questions have been answered. I have discussed any challenges I see with this plan with the nurse or doctor.    ..........................................................................................................................................  Patient/Patient Representative Signature      ..........................................................................................................................................  Patient Representative Print Name and Relationship to Patient    ..................................................               ................................................  Date                                            Time    ..........................................................................................................................................  Reviewed by Signature/Title    ...................................................              ..............................................  Date                                                            Time

## 2017-01-17 NOTE — IP AVS SNAPSHOT
MRN:[id number]                      After Visit Summary   1/17/2017    Rick Teixeira    MRN: [id number]           Thank you!     Thank you for choosing [facility] for your care. Our goal is always to provide you with excellent care. Hearing back from our patients is one way we can continue to improve our services. Please take a few minutes to complete the written survey that you may receive in the mail after you visit with us. Thank you!        Patient Information     Date Of Birth          1945        About your hospital stay     You were admitted on:  January 17, 2017 You last received care in the:  [place] [address] [facility]    You were discharged on:  January 21, 2017        Reason for your hospital stay       Extremities weakness possible GB                  Who to Call     For medical emergencies, please call 911.  For non-urgent questions about your medical care, please call your primary care provider or clinic, [phone]          Attending Provider     Provider    Arabella Kelley MD Anderson, Henry Tyalor, DO       Primary Care Provider Office Phone # Fax #    [facility] [place] [facility] [phone] [address] [place] [address]        Further instructions from your care team       Your risk factors for stroke or TIA (transient ischemic attack):    Your Risk Factors Your Results Normal Ranges   High blood pressure BP Readings from Last 1 Encounters:   01/21/17 122/82    Less than 120/80   Cholesterol              Total CHOL      197   1/18/2017   Less than 150    Triglycerides   TRIG      131   1/18/2017 Less than 150   LDL LDL      117   1/18/2017    Less than 70   HDL HDL       54   1/18/2017         Greater than 40 (men)  Greater than 50 (women)   Diabetes   Recent Labs  Lab 01/20/17  0540   *    Fasting blood glucose    Smoking/tobacco use  Quit smoking and tobacco   Overweight  Lose 1-2  "pounds a week   Lack of exercise  30 minutes moderate activity each day   Other risk factors include carotid (neck) artery disease, atrial fibrillation and stress. You may be on new medicine to treat high blood pressure, cholesterol, diabetes or atrial fibrillation.    Understanding Stroke Booklet given to patient. Please refer to booklet for further information.    Stroke warning signs and symptoms - CALL 911 right away for:  - Sudden numbness or weakness in the face, arm or leg (often on one side of the body).  - Sudden confusion or trouble understanding what is going on.  - Sudden blurred or decreased vision in one or both eyes.  - Sudden trouble speaking, loss of balance, dizziness or problems with coordination.  - Sudden, severe headache for no reason.  - Fainting or seizures.  - Symptoms may go away then come back suddenly.    [facility] Neurology: [phone]  [facility]: [phone]  [employer] Nephrology: [phone]      Pending Results     Date and Time Order Name Status Description    1/19/2017 1455 Paraneoplastic antibody In process             Statement of Approval     Ordered          01/21/17 1222  I have reviewed and agree with all the recommendations and orders detailed in this document.   EFFECTIVE NOW     Approved and electronically signed by:  Chico Brown MD             Admission Information        Provider Department Dept Phone    1/17/2017 Henry Pantoja DO  [facility] [phone]      Your Vitals Were     Blood Pressure Pulse Temperature    122/82 mmHg 127 98.1  F (36.7  C) (Axillary)    Respirations Height Weight    16 1.778 m (5' 10\") 77 kg (169 lb 12.1 oz)    BMI (Body Mass Index) Pulse Oximetry       24.36 kg/m2 96%       MyChart Information     [employer] lets you send messages to your doctor, view your test results, renew your prescriptions, schedule appointments and more. To sign up, go to [email or web address] . " "Click on \"Log in\" on the left side of the screen, which will take you to the Welcome page. Then click on \"Sign up Now\" on the right side of the page.     You will be asked to enter the access code listed below, as well as some personal information. Please follow the directions to create your username and password.     Your access code is: [email or web address]  Expires: 2017  3:59 PM     Your access code will  in 90 days. If you need help or a new code, please call your [facility] or [phone].        Care EveryWhere ID     This is your Care EveryWhere ID. This could be used by other organizations to access your [facility] medical records  [id number]           Review of your medicines      CONTINUE these medicines which have NOT CHANGED        Dose / Directions    AMLODIPINE BESYLATE PO        Dose:  5 mg   Take 5 mg by mouth daily   Refills:  0       ATENOLOL PO        Dose:  25 mg   Take 25 mg by mouth daily   Refills:  0       DOCUSATE SODIUM PO        Take by mouth daily   Refills:  0       LISINOPRIL PO        Dose:  10 mg   Take 10 mg by mouth daily   Refills:  0       multivitamin, therapeutic Tabs tablet        Dose:  1 tablet   Take 1 tablet by mouth daily   Refills:  0       PROCHLORPERAZINE MALEATE PO        Dose:  10 mg   Take 10 mg by mouth daily   Refills:  0       sennosides 8.6 MG tablet   Commonly known as:  SENOKOT        Dose:  1 tablet   Take 1 tablet by mouth 2 times daily   Refills:  0       VITAMIN D (CHOLECALCIFEROL) PO        Dose:  400 Units   Take 400 Units by mouth daily   Refills:  0                Protect others around you: Learn how to safely use, store and throw away your medicines at www.disposemymeds.org.             Medication List: This is a list of all your medications and when to take them. Check marks below indicate your daily home schedule. Keep this list as a reference.      Medications           Morning Afternoon Evening Bedtime As Needed    AMLODIPINE BESYLATE PO "   Take 5 mg by mouth daily                                ATENOLOL PO   Take 25 mg by mouth daily   Last time this was given:  25 mg on 1/21/2017 10:04 AM                                DOCUSATE SODIUM PO   Take by mouth daily   Last time this was given:  100 mg on 1/21/2017  8:05 AM                                LISINOPRIL PO   Take 10 mg by mouth daily                                multivitamin, therapeutic Tabs tablet   Take 1 tablet by mouth daily   Last time this was given:  1 tablet on 1/21/2017  8:05 AM                                PROCHLORPERAZINE MALEATE PO   Take 10 mg by mouth daily                                sennosides 8.6 MG tablet   Commonly known as:  SENOKOT   Take 1 tablet by mouth 2 times daily   Last time this was given:  1 tablet on 1/21/2017  8:05 AM                                VITAMIN D (CHOLECALCIFEROL) PO   Take 400 Units by mouth daily   Last time this was given:  400 Units on 1/21/2017  8:05 AM

## 2017-01-17 NOTE — IP AVS SNAPSHOT
` `     [address] [facility]: [phone]            Medication Administration Report for Rick Teixeira as of 01/21/17 1523   Legend:    Given Hold Not Given Due Canceled Entry Other Actions    Time Time (Time) Time  Time-Action       Inactive    Active    Linked        Medications 01/15/17 01/16/17 01/17/17 01/18/17 01/19/17 01/20/17 01/21/17    acetaminophen (TYLENOL) Suppository 650 mg  Dose: 650 mg Freq: EVERY 4 HOURS PRN Route: RE  PRN Reason: mild pain  Start: 01/17/17 2029   Admin Instructions: Alternate ibuprofen (if ordered) with acetaminophen.  Maximum acetaminophen dose from all sources = 75 mg/kg/day not to exceed 4 grams/day.               acetaminophen (TYLENOL) tablet 650 mg  Dose: 650 mg Freq: EVERY 4 HOURS PRN Route: PO  PRN Reason: mild pain  Start: 01/17/17 2029   Admin Instructions: Alternate ibuprofen (if ordered) with acetaminophen.  Maximum acetaminophen dose from all sources = 75 mg/kg/day not to exceed 4 grams/day.       2326 (650 mg)-Given        2251 (650 mg)-Given              atenolol (TENORMIN) tablet 25 mg  Dose: 25 mg Freq: DAILY Route: PO  Start: 01/21/17 0930          1004 (25 mg)-Given           bisacodyl (DULCOLAX) Suppository 10 mg  Dose: 10 mg Freq: DAILY PRN Route: RE  PRN Reason: constipation  Start: 01/17/17 2029   Admin Instructions: Hold for loose stools.  This is the third step of a three step constipation treatment protocol.               cholecalciferol (vitamin D) tablet 400 Units  Dose: 400 Units Freq: DAILY Route: PO  Start: 01/18/17 0900       1108 (400 Units)-Given        1313 (400 Units)-Given        0905 (400 Units)-Given        0805 (400 Units)-Given           docusate sodium (COLACE) capsule 100 mg  Dose: 100 mg Freq: DAILY Route: PO  Start: 01/18/17 0900       1109 (100 mg)-Given        1313 (100 mg)-Given        0905 (100 mg)-Given        0805 (100 mg)-Given           enoxaparin (LOVENOX) injection 40 mg  Dose: 40 mg Freq: EVERY 24  HOURS Route: SC  Start: 01/19/17 1400        1424 (40 mg)-Given        1435 (40 mg)-Given        1449 (40 mg)-Given           heparin 100 UNIT/ML injection 3 mL  Dose: 3 mL Freq: EVERY 24 HOURS PRN Route: IK  PRN Reason: line flush  PRN Comment: post meds or blood draw and PRN  Start: 01/19/17 0845   Admin Instructions: For tunneled apheresis capable catheter  To RED lumen               hydrALAZINE (APRESOLINE) injection 10 mg  Dose: 10 mg Freq: EVERY 4 HOURS PRN Route: IV  PRN Reason: high blood pressure  PRN Comment: give for SBP > 180  Start: 01/17/17 2110              HYDROcodone-acetaminophen (NORCO) 5-325 MG per tablet 1-2 tablet  Dose: 1-2 tablet Freq: EVERY 4 HOURS PRN Route: PO  PRN Reason: moderate to severe pain  Start: 01/17/17 2029   Admin Instructions: Maximum acetaminophen dose from all sources= 75 mg/kg/day not to exceed 4 grams         0127 (1 tablet)-Given       0512 (2 tablet)-Given       0932 (2 tablet)-Given       1424 (2 tablet)-Given       1836 (2 tablet)-Given       2237 (2 tablet)-Given        0640 (2 tablet)-Given       1200 (2 tablet)-Given       1600 (2 tablet)-Given       2033 (2 tablet)-Given        0220 (2 tablet)-Given       0711 (2 tablet)-Given           HYDROmorphone (PF) (DILAUDID) injection 0.3-0.5 mg  Dose: 0.3-0.5 mg Freq: EVERY 2 HOURS PRN Route: IV  PRN Reason: severe pain  Start: 01/17/17 2029   Admin Instructions: Hold while on PCA.               labetalol (NORMODYNE/TRANDATE) injection 10 mg  Dose: 10 mg Freq: EVERY 2 HOURS PRN Route: IV  PRN Reason: high blood pressure  PRN Comment: give for SBP > 180  Start: 01/17/17 2110   Admin Instructions: Hold for HR < 60               labetalol (NORMODYNE/TRANDATE) injection 10-40 mg  Dose: 10-40 mg Freq: EVERY 10 MIN PRN Route: IV  PRN Reasons: high blood pressure,other  PRN Comment: for Systolic Blood Pressure greater than 200 mmHg or Diastolic Blood Pressure greater than 110 mmHg  Start: 01/18/17 1220   Admin Instructions: Use  if Heart Rate 60 bpm or greater upon antihpertensive initiation.  Hold if Heart Rate less than 60 bpm.  Increase or repeat the dose if Blood Pressure goal not met. Give 10 mg, wait 10 minutes.  If not effective then give 20 mg. Wait 10 min.  If not effective then give 40 mg.  May add prn hydrazaline (if ordered) if Systolic Blood Pressure parameter is not met after reaching labetalol 40mg.  If hydrALAZINE not ordered can use enalapril (if ordered) (MAX daily dose: 300 mg / 24 hrs)   Notify provider within 1 hour if Blood Pressure parameters are not met.               lidocaine (LIDODERM) 5 % Patch 1-2 patch  Dose: 1-2 patch Freq: EVERY 24 HOURS 0800 Route: TD  Start: 01/20/17 1145   Admin Instructions: Apply patch(s) to right side of upper back. To prevent lidocaine toxicity, patient should be patch free for 12 hrs daily. Patches may be cut to smaller size prior to removing release liner.  NEVER APPLY HEAT OVER PATCH which will increase absorption and may lead to risk of local anesthetic toxicity. Do not apply over area where liposomal bupivacaine was injected for 96 hours post injection.          1155 (1 patch)-Given        0804 (1 patch)-Given           lidocaine (LIDODERM) Patch in Place  Freq: EVERY 8 HOURS Route: TD  Start: 01/20/17 1145   Admin Instructions: Chart every shift, confirming that patch is still in place on patient (no barcode scan needed). See patch order for dose information.  NEVER APPLY HEAT OVER PATCH which will increase absorption and may lead to risk of local anesthetic toxicity. Do not apply over area where liposomal bupivacaine injected for 96 hours.          1202 ( )-Patch in Place       2035 ( )-Patch in Place        0441 ( )-Negative [C]       1241 ( )-Patch in Place       [ ] 1945           lidocaine (LIDODERM) patch REMOVAL  Freq: EVERY 24 HOURS 2000 Route: TD  Start: 01/20/17 2000   Admin Instructions: Patient should have a 12 hour patch free interval          2035 ( )-Given        [  "] 2000           lidocaine (LMX4) cream  Freq: EVERY 1 HOUR PRN Route: Top  PRN Reason: pain  PRN Comment: with VAD insertion or accessing implanted port.  Start: 01/17/17 2029   Admin Instructions: Do NOT give if patient has a history of allergy to any local anesthetic or any \"greta\" product.   Apply 30 minutes prior to VAD insertion or port access.  MAX Dose:  2.5 g (  of 5 g tube)               lidocaine 1 % 1 mL  Dose: 1 mL Freq: EVERY 1 HOUR PRN Route: OTHER  PRN Comment: mild pain with VAD insertion or accessing implanted port  Start: 01/17/17 2029   Admin Instructions: Do NOT give if patient has a history of allergy to any local anesthetic or any \"greta\" product. MAX dose 1 mL subcutaneous OR intradermal in divided doses.               magnesium oxide (MAG-OX) tablet 400 mg  Dose: 400 mg Freq: DAILY Route: PO  Start: 01/20/17 1015         1435 (400 mg)-Given        0805 (400 mg)-Given           magnesium sulfate 4 g in 100 mL sterile water (premade)  Dose: 4 g Freq: EVERY 4 HOURS PRN Route: IV  PRN Reason: magnesium supplementation  Start: 01/20/17 1005   Admin Instructions: For serum Mg++ less than 1.6 mg/dL  Give 4 g and recheck magnesium level 2 hours after dose, and next AM.          1501 (4 g)-New Bag [C]            magnesium sulfate 4 g in 100 mL sterile water (premade)  Dose: 4 g Freq: EVERY 4 HOURS PRN Route: IV  PRN Reason: magnesium supplementation  Start: 01/17/17 2029   Admin Instructions: For serum Mg++ less than 1.6 mg/dL  Give 4 g and recheck magnesium level 2 hours after dose, and next AM.       2347 (4 g)-New Bag               Medication Instruction  Freq: CONTINUOUS PRN Route: XX  Start: 01/18/17 1218   Admin Instructions: No D5W IV solutions unless patient hypoglycemic.  Pharmacy to remove all dextrose from iv fluid orders as able.               metoprolol (LOPRESSOR) injection 2.5 mg  Dose: 2.5 mg Freq: EVERY 4 HOURS PRN Route: IV  PRN Reason: other  PRN Comment: HR > 120, hold for SBP < " 90  Start: 01/18/17 1500         2013 (2.5 mg)-Given [C]        0220 (2.5 mg)-Given [C]       0705 (2.5 mg)-Given [C]           mirtazapine (REMERON) tablet 15 mg  Dose: 15 mg Freq: AT BEDTIME Route: PO  Start: 01/19/17 2200        2107 (15 mg)-Given        2112 (15 mg)-Given        [ ] 2200           multivitamin, therapeutic (THERA-VIT) tablet 1 tablet  Dose: 1 tablet Freq: DAILY Route: PO  Start: 01/18/17 0900       1109 (1 tablet)-Given        1313 (1 tablet)-Given        0906 (1 tablet)-Given        0805 (1 tablet)-Given           naloxone (NARCAN) injection 0.1-0.4 mg  Dose: 0.1-0.4 mg Freq: EVERY 2 MIN PRN Route: IV  PRN Reason: opioid reversal  Start: 01/17/17 2029   Admin Instructions: For respiratory rate LESS than or EQUAL to 8.  Partial reversal dose:  0.1 mg titrated q 2 minutes for Analgesia Side Effects Monitoring Sedation Level of 3 (frequently drowsy, arousable, drifts to sleep during conversation).Full reversal dose:  0.4 mg bolus for Analgesia Side Effects Monitoring Sedation Level of 4 (somnolent, minimal or no response to stimulation).               ondansetron (ZOFRAN-ODT) ODT tab 4 mg  Dose: 4 mg Freq: EVERY 6 HOURS PRN Route: PO  PRN Reason: nausea  Start: 01/17/17 2029   Admin Instructions: This is Step 1 of nausea and vomiting management.  If nausea not resolved in 15 minutes, go to Step 2 prochlorperazine (COMPAZINE). Do not push through foil backing. Peel back foil and gently remove. Place on tongue immediately. Administration with liquid unnecessary                            Or  ondansetron (ZOFRAN) injection 4 mg  Dose: 4 mg Freq: EVERY 6 HOURS PRN Route: IV  PRN Reasons: nausea,vomiting  Start: 01/17/17 2029   Admin Instructions: This is Step 1 of nausea and vomiting management.  If nausea not resolved in 15 minutes, go to Step 2 prochlorperazine (COMPAZINE).         1220 (4 mg)-Given        1147 (4 mg)-Given            polyethylene glycol (MIRALAX/GLYCOLAX) Packet 17 g  Dose: 17 g  Freq: DAILY PRN Route: PO  PRN Reason: constipation  Start: 01/17/17 2029   Admin Instructions: Give in 8oz of  water, juice, or soda. Hold for loose stools.  This is the second step of a three step constipation treatment protocol.  1 Packet = 17 grams. Mixed prescribed dose in 8 ounces of water. Follow with 8 oz. of water.               potassium chloride (KLOR-CON) Packet 20-40 mEq  Dose: 20-40 mEq Freq: EVERY 2 HOURS PRN Route: ORAL OR FEED  PRN Reason: potassium supplementation  Start: 01/17/17 2029   Admin Instructions: Use if unable to tolerate tablets.  If Serum K+ 3.0-3.3, dose = 60 mEq po total dose (40 mEq x1 followed in 2 hours by 20 mEq x1). Recheck K+ level 4 hours after dose and the next AM.  If Serum K+ 2.5-2.9, dose = 80 mEq po total dose (40 mEq Q2H x2). Recheck K+ level 4 hours after dose and the next AM.  If Serum K+ less than 2.5, See IV order.  Dissolve packet contents in 4-8 ounces of cold water or juice.               potassium chloride 10 mEq in 100 mL intermittent infusion  Dose: 10 mEq Freq: EVERY 1 HOUR PRN Route: IV  PRN Reason: potassium supplementation  Start: 01/17/17 2029   Admin Instructions: Infuse via PERIPHERAL LINE or CENTRAL LINE. Use for central line replacement if patient weight less than 65 kg, if patient is on TPN with high potassium content or if unit does not stock 20 mEq bags.   If Serum K+ 3.0-3.3, dose = 10 mEq/hr x4 doses (40 mEq IV total dose). Recheck K+ level 2 hours after dose and the next AM.   If Serum K+ less than 3.0, dose = 10 mEq/hr x6 doses (60 mEq IV total dose). Recheck K+ level 2 hours after dose and the next AM.               potassium chloride 10 mEq in 100 mL intermittent infusion with 10 mg lidocaine  Dose: 10 mEq Freq: EVERY 1 HOUR PRN Route: IV  PRN Reason: potassium supplementation  Start: 01/17/17 2029   Admin Instructions: Infuse via PERIPHERAL LINE. Use potassium with lidocaine for pain with peripheral administration.  If Serum K+ 3.0-3.3, dose  = 10 mEq/hr x4 doses (40 mEq IV total dose). Recheck K+ level 2 hours after dose and the next AM.  If Serum K+ less than 3.0, dose = 10 mEq/hr x6 doses (60 mEq IV total dose). Recheck K+ level 2 hours after dose and the next AM.               potassium chloride 20 mEq in 50 mL intermittent infusion  Dose: 20 mEq Freq: EVERY 1 HOUR PRN Route: IV  PRN Reason: potassium supplementation  Start: 01/17/17 2029   Admin Instructions: Infuse via CENTRAL LINE Only. May need EKG if less than 65 kg or on TPN - Max rate is 0.3 mEq/kg/hr for patients not on EKG monitoring.   If Serum K+ 3.0-3.3, dose = 20 mEq/hr x2 doses (40 mEq IV total dose). Recheck K+ level 2 hours after dose and the next AM.  If Serum K+ less than 3.0, dose = 20 mEq/hr x3 doses (60 mEq IV total dose). Recheck K+ level 2 hours after dose and the next AM.               potassium chloride SA (K-DUR/KLOR-CON M) CR tablet 20-40 mEq  Dose: 20-40 mEq Freq: EVERY 2 HOURS PRN Route: PO  PRN Reason: potassium supplementation  Start: 01/17/17 2029   Admin Instructions: Use if able to take PO.   If Serum K+ 3.0-3.3, dose = 60 mEq po total dose (40 mEq x1 followed in 2 hours by 20 mEq x1). Recheck K+ level 4 hours after dose and the next AM.  If Serum K+ 2.5-2.9, dose = 80 mEq po total dose (40 mEq Q2H x2). Recheck K+ level 4 hours after dose and the next AM.  If Serum K+ less than 2.5, See IV order.       2326 (40 mEq)-Given               senna-docusate (SENOKOT-S;PERICOLACE) 8.6-50 MG per tablet 1-2 tablet  Dose: 1-2 tablet Freq: 2 TIMES DAILY PRN Route: PO  PRN Comment: constipation   Start: 01/17/17 2029   Admin Instructions: If no bowel movement in 24 hours, increase to 2 tablets PO BID.  Hold for loose stools.   This is the first step of a three step constipation treatment protocol.        1109 (1 tablet)-Given              sennosides (SENOKOT) tablet 1 tablet  Dose: 1 tablet Freq: 2 TIMES DAILY Route: PO  Start: 01/17/17 2115      (5556)-Not Given         (1110)-Not Given [C]       (2218)-Not Given        1313 (1 tablet)-Given       2107 (1 tablet)-Given        0905 (1 tablet)-Given       2019 (1 tablet)-Given        0805 (1 tablet)-Given       [ ] 2100           sodium chloride (PF) 0.9% PF flush 10 mL  Dose: 10 mL Freq: EVERY 1 HOUR PRN Route: IK  PRN Reasons: line flush,post meds or blood draw  PRN Comment: and PRN  Start: 01/19/17 0845   Admin Instructions: For tunneled apheresis capable catheter  To RED lumen               sodium chloride (PF) 0.9% PF flush 3 mL  Dose: 3 mL Freq: EVERY 1 HOUR PRN Route: IK  PRN Reasons: line flush,post meds or blood draw  Start: 01/17/17 2029   Admin Instructions: for peripheral IV flush post IV meds        1034 (3 mL)-Given              sodium chloride (PF) 0.9% PF flush 3 mL  Dose: 3 mL Freq: EVERY 8 HOURS Route: IK  Start: 01/17/17 2030   Admin Instructions: And Q1H PRN, to lock peripheral IV dormant line.       2331 (3 mL)-Given        (0654)-Not Given       1428 (3 mL)-Given       2219 (3 mL)-Given        1220 (3 mL)-Given              2134 (3 mL)-Given        0633 (3 mL)-Given       1435 (3 mL)-Given       2113 (3 mL)-Given        0814 (3 mL)-Given       1449 (3 mL)-Given       [ ] 2200           sodium chloride (PF) 0.9% PF flush 3 mL  Dose: 3 mL Freq: EVERY 1 HOUR PRN Route: IK  PRN Reason: line flush  PRN Comment: for peripheral IV flush post IV meds  Start: 01/17/17 2029         1644 (3 mL)-Given           Future Medications  Medications 01/15/17 01/16/17 01/17/17 01/18/17 01/19/17 01/20/17 01/21/17       0.9% sodium chloride BOLUS  Dose: 250 mL Freq: 2 TIMES DAILY PRN Route: IV  PRN Comment: for symptomatic hypotension and Systolic Blood Pressure less than or equal to  80 mmHg.  Start: 01/22/17 0800   Admin Instructions: Notify provider if administered.               albumin human 5 % injection 3,500 mL  Dose: 3,500 mL Freq: ONCE Route: APHERESIS  Start: 01/22/17 0800              Anticoagulant Citrate Dextrose  Formula A   Dose: 1,000 mL Freq: ONCE Route: APHERESIS  Start: 01/22/17 0800   Admin Instructions: Ratio of 15:1, 13:1, 10:1 with blood (15 mL, 13 mL or 10 mL of blood to 1 mL ACD-A ), Nurse to determine.  For Apheresis Use Only.               calcium gluconate 5 g in NaCl 0.9 % 100 mL  Dose: 5 g Freq: ONCE Route: IV  Start: 01/22/17 0800   Admin Instructions: IVPB given via IV Infusion Pump.  To be infused over time of Apheresis Treatment. Administered by Apheresis Nurse ONLY.               heparin Lock (1000 units/mL High concentration) 1,000-5,000 Units  Dose: 1-5 mL Freq: ONCE PRN Route: IV  PRN Reason: other  PRN Comment: POST APHERESIS to lock CVC   Start: 01/22/17 0800   Admin Instructions: Prior to blood draw, remove 5 mL blood and discard from each port. Send in 1 vial of 10 mL.               ondansetron (ZOFRAN) injection 4 mg  Dose: 4 mg Freq: ONCE Route: IV  Start: 01/22/17 0800             Completed Medications  Medications 01/15/17 01/16/17 01/17/17 01/18/17 01/19/17 01/20/17 01/21/17         Dose: 5,000 mL Freq: ONCE Route: APHERESIS  Start: 01/20/17 1030   End: 01/20/17 1115         1115 (5,000 mL)-Given              Dose: 5,000 mL Freq: ONCE Route: APHERESIS  Start: 01/19/17 0900   End: 01/19/17 1005        1005 (5,000 mL)-Given [C]               Dose: 500-2,000 mL Freq: ONCE Route: APHERESIS  Start: 01/20/17 1015   End: 01/20/17 1115   Admin Instructions: Ratio of 15:1, 13:1, 10:1 with blood (15 mL, 13 mL or 10 mL of blood to 1 mL ACD-A ), Nurse to determine.  For Apheresis Use Only.          1115 (1,000 mL)-Given              Dose: 500-2,000 mL Freq: ONCE Route: APHERESIS  Start: 01/19/17 0900   End: 01/19/17 1005   Admin Instructions: Ratio of 15:1, 13:1, 10:1 with blood (15 mL, 13 mL or 10 mL of blood to 1 mL ACD-A ), Nurse to determine.  For Apheresis Use Only.         1005 (1,000 mL)-Given [C]               Dose: 5 g Freq: ONCE Route: IV  Start: 01/19/17 0900   End: 01/19/17 1005   Admin  Instructions: IVPB given via IV Infusion Pump.  To be infused over time of Apheresis Treatment. Administered by Apheresis Nurse ONLY.         1005 (5 g)-Given [C]               Dose: 6 g Freq: ONCE Route: IV  Start: 01/20/17 1015   End: 01/20/17 1115   Admin Instructions: IVPB given via IV Infusion Pump.  To be infused over time of Apheresis Treatment. Administered by Apheresis Nurse ONLY.          1115 (6 g)-Given              Dose: 1-6 mL Freq: ONCE PRN Route: IV  PRN Reason: other  PRN Comment: POST APHERESIS to lock CVC   Start: 01/20/17 1200   End: 01/20/17 1343   Admin Instructions: Prior to blood draw, remove 5 mL blood and discard from each port. Send in 1 vial of 10 mL.          1343 (3,200 Units)-Given              Dose: 2-6 mL Freq: ONCE Route: IK  Start: 01/19/17 0900   End: 01/19/17 1300   Admin Instructions: Prior to blood draw, remove 5 mL blood and discard from each port. Send in 1 vial of 10 mL.         1300 (3,200 Units)-Given [C]               Dose: 6,000 Units Freq: ONCE Route: IK  Start: 01/19/17 0830   End: 01/19/17 0823        0823 (6,000 Units)-Given       (1327)-Not Given [C]               Dose: 70 mL Freq: ONCE Route: IV  Start: 01/18/17 1615   End: 01/18/17 1720       1720 (70 mL)-Given                Dose: 1-30 mL Freq: ONCE PRN Route: SC  PRN Comment: for local anesthetic.  When verbally ordered by prescriber during the procedure.  Start: 01/19/17 0752   End: 01/19/17 0824   Admin Instructions: Dose to be divided into smaller volumes appropriate for the procedure.         0824 (18 mg)-Given               Dose: 10 mL Freq: ONCE Route: IV  Start: 01/18/17 1530   End: 01/18/17 1530       1530 (10 mL)-Given [C]                Dose: 20 mL Freq: ONCE Route: IV  Start: 01/18/17 1530   End: 01/18/17 1530       1530 (20 mL)-Given [C]                Dose: 100 mL Freq: ONCE Route: IV  Start: 01/18/17 1615   End: 01/18/17 1720   Admin Instructions: This entry is for use by Radiology to  intermittently used as a flush in patients receiving a CT scan.        1720 (100 mL)-Given                Dose: 5 mL Freq: ONCE Route: IV  Start: 01/18/17 1530   End: 01/18/17 1529       1529 (2 mL)-Given             Discontinued Medications  Medications 01/15/17 01/16/17 01/17/17 01/18/17 01/19/17 01/20/17 01/21/17         Dose: 250 mL Freq: 2 TIMES DAILY PRN Route: IV  PRN Comment: for symptomatic hypotension and Systolic Blood Pressure less than or equal to  80 mmHg.  Start: 01/20/17 1030   End: 01/20/17 1422   Admin Instructions: Notify provider if administered.          1422-Med Discontinued          Dose: 250 mL Freq: 2 TIMES DAILY PRN Route: IV  PRN Comment: for symptomatic hypotension and Systolic Blood Pressure less than or equal to  80 mmHg.  Start: 01/19/17 0835   End: 01/20/17 0958   Admin Instructions: Notify provider if administered.          0958-Med Discontinued          Start: 01/19/17 0753   End: 01/19/17 0840   Admin Instructions: Rosa Hernandez: cabinet override         0840-Med Discontinued           Dose: 25-50 mcg Freq: EVERY 5 MIN PRN Route: IV  PRN Reason: severe pain  PRN Comment: If inadequate response may repeat 25 mcg IV slowly Q 5 min PRN severe pain  Start: 01/19/17 0752   End: 01/19/17 0840   Admin Instructions: Doses can be exceeded under direct oversight of patient by physician.         0802 (25 mcg)-Given       0840-Med Discontinued           Dose: 0.2 mg Freq: EVERY 1 MIN PRN Route: IV  PRN Reason: benzodiazepine reversal  PRN Comment: If inadequate response after 45 seconds, may repeat 0.2 mg IV Q 1 minute PRN oversedation.  Start: 01/19/17 0752   End: 01/19/17 0840   Admin Instructions: Give over 15 seconds.  Maximum total dose of 1 mg.  Continue monitoring until discharge criteria met for a minimum of 2 hours.         0840-Med Discontinued           Start: 01/19/17 0727   End: 01/19/17 0746   Admin Instructions: Rene Al: cabinet override         0746-Med Discontinued            Dose: 3 mL Freq: EVERY 24 HOURS Route: IK  Start: 01/19/17 0900   End: 01/19/17 1412   Admin Instructions: Line flush, post meds or blood draw, and PRN    For tunneled apheresis-capable catheter  To BLUE lumen         [ ] 0900       1412-Med Discontinued           Dose: 2-6 mL Freq: ONCE Route: IK  Start: 01/19/17 0845   End: 01/19/17 0850   Admin Instructions: Prior to blood draw, remove 5 mL blood and discard from each port. Send in 1 vial of 10 mL.                0850-Med Discontinued           Freq: CONTINUOUS PRN Route: XX  Start: 01/19/17 0019   End: 01/19/17 0840   Admin Instructions: May take regular AM medications except those listed below.         0840-Med Discontinued           Start: 01/19/17 0753   End: 01/19/17 0840   Admin Instructions: Rosa Hernandez: cabinet override         0840-Med Discontinued           Dose: 0.5-1 mg Freq: EVERY 4 MIN PRN Route: IV  PRN Reason: sedation  PRN Comment: If inadequate response may repeat 0.5 mg IV slowly Q 4 minutes PRN sedation until desired response.  Start: 01/19/17 0752   End: 01/19/17 0840   Admin Instructions: Doses can be exceeded under direct oversight of patient by physician.         0801 (1 mg)-Given       0840-Med Discontinued           Dose: 0.1-0.4 mg Freq: EVERY 2 MIN PRN Route: IV  PRN Reason: opioid reversal  Start: 01/19/17 0752   End: 01/19/17 0840   Admin Instructions: For respiratory rate LESS than or EQUAL to 8.  Partial reversal dose:  0.1 mg titrated q 2 minutes for Analgesia Side Effects Monitoring Sedation Level of 3 (frequently drowsy, arousable, drifts to sleep during conversation).Full reversal dose:  0.4 mg bolus for Analgesia Side Effects Monitoring Sedation Level of 4 (somnolent, minimal or no response to stimulation).         0840-Med Discontinued           Dose: 10 mL Freq: EVERY HOUR Route: IK  Start: 01/19/17 0900   End: 01/19/17 1413   Admin Instructions: Line flush and PRN  For tunneled apheresis-capable catheter  To BLUE  lumen         [ ] 0900       [ ] 1000       [ ] 1100       [ ] 1200       [ ] 1300       0523-Med Discontinued            Medications 01/15/17 01/16/17 01/17/17 01/18/17 01/19/17 01/20/17 01/21/17

## 2017-01-17 NOTE — CONSULTS
[facility]    Neurology Consultation     Date of Admission:  1/17/2017  Date of Consult (When I saw the patient): 01/17/2017    Assessment and Plan  Rick Teixeira is a 71 year old male who was admitted on 1/17/2017. I was asked to see the patient for generalized weakness.      #. Admit for stroke workup  --Likely due to hypercoag from cancer  --MRI brain-done 2-3 small R cortical ischemic strokes  --vessel imaging: pending  --echo with bubble: pending  --tele monitoring  --labs: HA1C, lipids, TSH: pending  --likely needs chronic anticoagulation- will defer until full workup done  # Generalized weakness  --The small R sided strokes do not explain these symptoms  --decreased reflexes- unlikely cervical cord  --High in differential is GBS  --Also possible rapid onset other neuropathy or myopathy, cancer related but this was very quick onset for that  --LP and CSF eval  --NIF, FVC BID, more often if respiratory weakness or abnormal values found  #anemic- may add to weakness.  #. 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    Patsy Gauthier MD    Code Status   No Order        Reason for Consult  Reason for consult: I was asked by Dr. Pantoja to evaluate this patient for severe weakness.      Chief Complaint  Weak all over    History is obtained from the patient and electronic chart    History of Present Illness  Rick Teixeira is a 71 year old male with metastatic cholangiocarinoma who presents with rapid/subacute onset of weakness BUE and BLE over 2 days.  He has been unable to walk for the past 2 days.  Notes weakness in the arms and legs.  Continued chronic mid back pain, no other significant new pain, no muscle pain.  He notes some numbness as well.  No bowel/bladder problems but was unable to urinate in the ER today.  He denies any facial weakness, no slurred speech,  no problems breathing.  No recent illness- no URI or diarrhea.  No recent fevers or feeling ill.  Last chemo was 2 weeks ago.  He has plans for starting new chemo next week.    Past Medical History  I have reviewed this patient's medical history and updated it with pertinent information if needed.   Past Medical History   Diagnosis Date     Hypertension      Cancer (H)      liver and lung       Past Surgical History  I have reviewed this patient's surgical history and updated it with pertinent information if needed.  Past Surgical History   Procedure Laterality Date     Genitourinary surgery       testicular cancer       Prior to Admission Medications  Prior to Admission Medications   Prescriptions Last Dose Informant Patient Reported? Taking?   AMLODIPINE BESYLATE PO   Yes Yes   LISINOPRIL PO   Yes Yes      Facility-Administered Medications: None     Allergies  Allergies   Allergen Reactions     Ancef [Cefazolin] Other (See Comments)     Chest heaviness     Penicillins Unknown       Social History  I have reviewed this patient's social history and updated it with pertinent information if needed. Rick Teixeira  reports that he has quit smoking. He does not have any smokeless tobacco history on file. He reports that he does not drink alcohol or use illicit drugs.    Family History  I have reviewed this patient's family history and updated it with pertinent information if needed.   No significant neuro history.     Review of Systems  The 10 point Review of Systems is negative other than noted in the HPI.    Physical Exam  Temp: 98.2  F (36.8  C) Temp src: Oral BP: (!) 133/101 mmHg Pulse: 84   Resp: 14 SpO2: 94 % O2 Device: None (Room air)    Vital Signs with Ranges  Temp:  [98.2  F (36.8  C)] 98.2  F (36.8  C)  Pulse:  [] 84  Resp:  [14-18] 14  BP: (124-137)/() 133/101 mmHg  SpO2:  [94 %-100 %] 94 %  165 lbs 0 oz    General Appearance:  No acute distress  Neuro:       Mental Status Exam:  A,A, fully  oriented- able to discuss in detail his chemo treatments, symptoms.       Cranial Nerves: CN 2-12 examined and intact       Motor:  2/5 B prox arms, biceps, 3/5B wrist flexors and hand , 2/5 B hip flexors, knee flexors, 3/5 B dorsi and plantar flexion       Reflexes:  0 throughout, downgoing toes, no clonus       Sensory:  Decreased pinprick B arms, R foot up to above knee, L foot up to below knee       Coordination:  Unable to assess- too weak       Gait:  Unable to assess- too weak  Neck: no nuchal rigidity. No carotid bruits.    Extremities: No clubbing, no cyanosis, no edema  CV: RRR nl s1, s2  Resp: CTAB        Data  Results for orders placed or performed during the hospital encounter of 01/17/17 (from the past 24 hour(s))   Basic metabolic panel   Result Value Ref Range    Sodium 137 133 - 144 mmol/L    Potassium 3.3 (L) 3.4 - 5.3 mmol/L    Chloride 102 94 - 109 mmol/L    Carbon Dioxide 30 20 - 32 mmol/L    Anion Gap 5 3 - 14 mmol/L    Glucose 99 70 - 99 mg/dL    Urea Nitrogen 16 7 - 30 mg/dL    Creatinine 1.17 0.66 - 1.25 mg/dL    GFR Estimate 62 >60 mL/min/1.7m2    GFR Estimate If Black 75 >60 mL/min/1.7m2    Calcium 9.5 8.5 - 10.1 mg/dL   CBC (+ platelets, no diff)   Result Value Ref Range    WBC 3.7 (L) 4.0 - 11.0 10e9/L    RBC Count 2.87 (L) 4.4 - 5.9 10e12/L    Hemoglobin 10.0 (L) 13.3 - 17.7 g/dL    Hematocrit 30.0 (L) 40.0 - 53.0 %     (H) 78 - 100 fl    MCH 34.8 (H) 26.5 - 33.0 pg    MCHC 33.3 31.5 - 36.5 g/dL    RDW 16.8 (H) 10.0 - 15.0 %    Platelet Count 144 (L) 150 - 450 10e9/L   Magnesium   Result Value Ref Range    Magnesium 1.1 (L) 1.6 - 2.3 mg/dL   INR   Result Value Ref Range    INR 1.11 0.86 - 1.14   Hepatic panel   Result Value Ref Range    Bilirubin Direct 0.3 (H) 0.0 - 0.2 mg/dL    Bilirubin Total 0.8 0.2 - 1.3 mg/dL    Albumin 3.2 (L) 3.4 - 5.0 g/dL    Protein Total 7.2 6.8 - 8.8 g/dL    Alkaline Phosphatase 242 (H) 40 - 150 U/L    ALT 28 0 - 70 U/L    AST 51 (H) 0 - 45 U/L    MR Cervical Spine w/o Contrast    Narrative    MR CERVICAL SPINE W/O CONTRAST   1/17/2017 3:03 PM     HISTORY: bilateral UE/LE weakness    TECHNIQUE:  Multiplanar multisequence MRI of the cervical spine  without gadolinium contrast.     COMPARISON:  None.    FINDINGS: Motion artifact degrades quality of these images.  Infiltrative lesions are seen within the C7 and T2 and T3 vertebral  bodies. This is consistent with metastatic disease to these vertebrae.  There is abnormal soft tissue extending from the T2 vertebral body  into the right ventral epidural space and right T2-T3 neural foramen.  This could affect the right T2 nerve root. There is moderate central  spinal stenosis at C6-7 due to bulging disc and associated  osteophytes. There is mild flattening of the cord at this level due to  the degenerative change.    C2-C3:  Normal disc, facet joints, spinal canal and neural foramina.    C3-C4:  Mild degenerative change in the facet joints.    C4-C5:  Mild annular bulge. Central canal still adequate. Moderate  foraminal stenosis due to uncinate spurs and loss of disc space  height.    C5-C6:  Loss of disc space height. Diffuse annular disc bulge. Central  canal mildly narrowed due to the bulging disc. Moderate bilateral  foraminal stenosis due to uncinate spurs.    C6-C7:  Degeneration of the disc. Diffuse annular disc bulge with  associated posterior osteophytes leading to moderate central stenosis  and mild flattening of the cord. There is also moderate bilateral  foraminal stenosis at this level due to loss of disc space height and  uncinate spurs.    C7-T1: Degeneration of the disc. Mild annular disc bulge. Moderate  bilateral foraminal stenosis due to uncinate spurs.      Impression    IMPRESSION:    1. Study is consistent with bone metastasis to C7, T2, and T3. There  is a small amount of epidural tumor at the right T2 level which also  extends into the right T2-3 neural foramen. This could affect  the  right T2 nerve root. No central stenosis is seen at this level.  2. Multilevel degenerative change. The degenerative changes are  leading to moderate central stenosis at C6-7 with mild flattening of  the cord at this level.  3. There is also moderate foraminal stenosis at multiple levels due to  loss of disc space height and uncinate spurs.    KEILY GARCIA MD   MR Thoracic Spine w/o Contrast    Narrative    MR THORACIC SPINE W/O CONTRAST 1/17/2017 3:04 PM     HISTORY: limited movement of UE/LE    TECHNIQUE: Multiplanar multisequence MRI of the thoracic spine without  gadolinium contrast.    COMPARISON: None.    FINDINGS: Inversion recovery images reveal infiltrative lesions within  the the C7, T2 and T12 vertebral bodies. There is also an infiltrative  process in the T9 lamina and spinous process. These are consistent  with metastatic disease. A very small amount of epidural tumor is seen  at the right T3 2 level causing only slight mass effect on the dural  sac. This is better seen on the MR scan of the cervical spine. There  are multilevel degenerative changes with loss of disc space height  throughout the thoracic spine. Mild bulging discs are seen at T4-5,  C5-6, and there is a small central disc protrusion at T6-7.      Impression    IMPRESSION:    1. Infiltrative lesions within the C7 and T2, and T12 vertebral bodies  and within the T9 lamina and spinous process. This is consistent with  metastatic disease.  2. Small amount of epidural tumor at the T2 level but no cord  compression or central stenosis.  3. Multilevel degenerative change.    KEILY GARCIA MD   MR Brain w/o Contrast    Narrative    MR BRAIN W/O CONTRAST 1/17/2017 3:04 PM     HISTORY: bilateral UE/LE weakness    TECHNIQUE: Multiplanar, multisequence MRI of the brain without IV  contrast material. No contrast was administered because of the length  of the examination. The patient was becoming uncomfortable.    COMPARISON: None.    FINDINGS: Motion  artifact degrades quality of these images.  There is  generalized atrophy of the brain. . There are small areas of  restricted diffusion in the cortex of the right frontal lobe. These  measure about 4 mm in size. These would be compatible with recent  cortical infarcts. No lesions are seen in the cerebellum or left  hemisphere.. . The facial structures appear normal. The arteries at  the base of the brain and the dural venous sinuses appear patent. No  brain metastasis are identified. No mass lesions are seen.      Impression    IMPRESSION:   1. Small areas of restricted diffusion in the cortex of the right  frontal lobe. These are consistent with small recent cortical  infarcts.  2. No brain metastasis are identified. No mass lesions are seen.  3. Generalized atrophy of the brain. .  4. No skull metastasis are identified.     KEILY GARCIA MD   MR Lumbar Spine w/o Contrast    Narrative    MR LUMBAR SPINE WITHOUT CONTRAST1/17/2017 2:59 PM      HISTORY: History of cancer, increasing weakness.    TECHNIQUE:  Multiplanar, multisequence MRI of the lumbar spine without  contrast.     COMPARISON: None.    FINDINGS:This report assumes five lumbar type vertebrae.     The conus and visualized portions of the thoracic spinal cord appear  normal. The paraspinal soft tissues appear normal as visualized. The  bone marrow has normal signal intensity. No bone metastasis are seen  in the lumbar region. No central spinal stenosis or cord compression.    L1-L2:   Normal disc, facet joints, spinal canal and neural foramina.    L2-L3:  Normal disc, facet joints, spinal canal and neural foramina.    L3-L4:  Degeneration of the disc. Diffuse annular disc bulge. There is  a small more right central disc herniation causing mild mass effect on  the dural sac. Central canal is mildly narrowed due to this right  central disc herniation. This herniation extends slightly inferior to  the level of the disc and posterior to the L4 vertebral  body.    L4-L5:  Degeneration of the disks. Loss of disc space height. Diffuse  annular disc bulge. There is disc material extending into the right  and left neural foramen causing moderate bilateral foraminal stenosis.  Mild central stenosis due to the bulging disc and degenerative change  off the facet joints.    L5-S1:  Normal disc, facet joints, spinal canal and neural foramina.      Impression    IMPRESSION:   1. No bone metastasis are seen in the lumbar region.  2. Multilevel degenerative change.  3. Moderate size right central disc herniation at L3-L4 causing mild  mass effect on the dural sac. This could affect the right L5 nerve  root as it arises from the sac. It is also causing mild central  stenosis.  4. Moderate bilateral L4-5 foraminal stenosis due to a bulging disc  and degenerative change off the facet joints. There is also moderate  central stenosis and lateral recess stenosis at this level due to  degenerative change off the facet joints and a bulging disc.    KEILY GARCIA MD   EKG 12 lead   Result Value Ref Range    Interpretation ECG Click View Image link to view waveform and result            Imaging and procedures:  I personally reviewed these images.  MRI brain: very small areas R cortical infarcts  MRI Cspine: bone mets C7, T2, T3; epidural tumor T2; moderate central stenosis c6-7  MRI Tspine: as above mets and T2 tumor  MRI L spine: mod R L3-4 disk, no mets

## 2017-01-17 NOTE — ED PROVIDER NOTES
History     Chief Complaint:  Generalized weakness    HPI   Rick Teixeira is a 71 year old male with known metastatic intrahepatic cholangiocarcinoma  last treated with gemcitabine and cisplatin 3 weeks ago who presents via EMS with generalized weakness. The patient reports since yesterday he has had gradually worsening generalized weakness. Today he has had difficulty walking or even raising his arms due to lack of strength and patient called his clinic who advised they call 911 services. The patient was otherwise able to ambulate yesterday. The patient does note he talked to his oncologist yesterday and will have his chemotherapy medications changed to FOLFOX due to lack of improvement on his previous medications after being seen at [place]. The patient notes since hearing he has months to live last week by his doctor he has been getting less and less sleep and was prescribed a sleep aid as well as narocotics for this. Patient also describes increased numbness in his fingertips, which is new, as well as return of back pain which he has experienced in the past. The patient denies recent headaches or falls. He has had numbness to the tops of his bilateral feet, which is not new, as well. Has no difficulty breathing or speech difficulty.    Allergies:  Ancef  Penicillins     Medications:    Gemcitabine  Cisplatin  Amlodipine besylate  Lisinopril    Problem List:    Carcinoma of hepatic duct     Past Medical History:    HTN  Cancer    Past Surgical History:    Testicular cancer    Family History:    The patient has no pertinent family history.     Social History:  .  The patient is a former smoker.  The patient denies alcohol consumption.      Review of Systems   Constitutional: Positive for fatigue (generalized weakness).   Musculoskeletal: Positive for back pain.   Neurological: Positive for numbness.   All other systems reviewed and are negative.    Physical Exam     Patient Vitals for the past 24 hrs:    "BP Temp Temp src Pulse Resp SpO2 Height Weight   01/17/17 1215 (!) 137/100 mmHg 98.2  F (36.8  C) Oral 100 18 100 % 1.778 m (5' 10\") 74.844 kg (165 lb)          Physical Exam  General: Resting comfortably on the gurney  Eyes:  The pupils are equal and round, EOMI  ENT:    Atraumatic  Neck:  Normal range of motion    No neck pain     Able to move neck w/o pain  CV:  Irregluar rate and rhythm    Skin warm and well perfused     DP/PT and radial pulses equal  Resp:  Lungs are clear    Non-labored    No rales    No wheezing   GI:  Abdomen is soft, there is no rigidity    No distension    No rebound tenderness     No abdominal tenderness  MS:  Normal muscular tone    Tender over mid thoracic spine    No lumbar or cervical spine tenderness  Skin:  No rash or acute skin lesions noted  Neuro:   Awake, alert.      Speech is normal and fluent.    Face is symmetric.     No facial droop    Decreased movement in UE and LE, able to move bilateral UE and LE but decreased    SILT on bilateral UE/LE    Diminished reflexes in patellar, brachioradialis    Downgoing babinski bilaterally  Psych:  Normal affect.  Appropriate interactions.    Emergency Department Course   ECG:  Completed at 1606.  Read at 1611.   Rate 86 bpm. WA interval *. QRS duration 84. QT/QTc 362/433. P-R-T axes * 9 7. atrial fibrillation, abnormal ECG.      Imaging:  Radiographic findings were communicated with the patient who voiced understanding of the findings.    MR Lumbar spine w/o contrast:  1. No bone metastasis are seen in the lumbar region.  2. Multilevel degenerative change.  3. Moderate size right central disc herniation at L3-L4 causing mild  mass effect on the dural sac. This could affect the right L5 nerve  root as it arises from the sac. It is also causing mild central  stenosis.  4. Moderate bilateral L4-5 foraminal stenosis due to a bulging disc  and degenerative change off the facet joints. There is also moderate  central stenosis and lateral recess " stenosis at this level due to  degenerative change off the facet joints and a bulging disc.  Results per radiology.     MR Brain w/o contrast:  1. Small areas of restricted diffusion in the cortex of the right  frontal lobe. These are consistent with small recent cortical  infarcts.  2. No brain metastasis are identified. No mass lesions are seen.  3. Generalized atrophy of the brain. .  4. No skull metastasis are identified.  Results per radiology.     MR Thoracic Spine w/o contrast:  1. Infiltrative lesions within the C7 and T2, and T12 vertebral bodies  and within the T9 lamina and spinous process. This is consistent with  metastatic disease.  2. Small amount of epidural tumor at the T2 level but no cord  compression or central stenosis.  3. Multilevel degenerative change.  Results per radiology.     MR Cervical Spine w/o contrast:  1. Study is consistent with bone metastasis to C7, T2, and T3. There  is a small amount of epidural tumor at the right T2 level which also  extends into the right T2-3 neural foramen. This could affect the  right T2 nerve root. No central stenosis is seen at this level.  2. Multilevel degenerative change. The degenerative changes are  leading to moderate central stenosis at C6-7 with mild flattening of  the cord at this level.  3. There is also moderate foraminal stenosis at multiple levels due to  loss of disc space height and uncinate spurs.  Results per radiology.     Laboratory:  BMP: K 3.3 (L), ow wnl (Creat 1.17)  CBC: WBC 3.7 (L), HGB 10.0 (L),  (L), ow wnl  Magnesium: 1.1 (L)  INR: 1.11  HFP: Bili direct 0.3 (H), Alb 3.2 (L), Alk Phosph 242 (H), AST 51 (H), ow wnl    Interventions:  Magnesium sulfate 2g IV  K-dur 20 mEq tablet  Zofran 4 mg IV  Decadron 10 mg IV  Dilaudid 2 mg IV    Emergency Department Course:  Nursing notes and vitals reviewed.  I performed an exam of the patient as documented above.     The above imaging study(s) were ordered with results noted above.      Blood drawn. This was sent to the lab for further testing, results above.     The patient received the intervention(s) above.   1620: Discussed patient with assistant with Dr. Starks and neurology  1650: Discussed the patient with Dr. Starks  Discussed patient with Dr. Barriga  Discussed patient with Dr. Pantoja  1745: Patient evaluated by Dr. Barriga  1800: Discussed patient with Dr. Pantoja    Findings and plan explained to the Patient who consents to admission. Discussed the patient with Dr. Pantoja, who will admit the patient for further monitoring, evaluation, and treatment.     Impression & Plan    Medical Decision Making:  Rick Teixeira is a 71 year old male who presented to the emergency department with generalized weakness. Vital signs are within normal limits. Exam notable for significant lower extremity and upper extremity weakness bilaterally. His face is normal and symmetric. He is breathing comfortable and speech is clear. He was complaining of some back pain yesterday and I was concerned for metastatic spine lesions causing compression on the cord causing the profound weakness. Could also be stroke versus fracture versus Guillain [place]. Discussed with neurology who recommended obtaining cervical spine, brain, and the rest of his spine if cervical spine does not show findings that would be consistent with this. I ordered the MRIs and had discussions with the MRI tech about what I wanted and he also sent the images to the radiologist in the middle of it after the cervical spine images to see if there would be anything on the cervical spine that could be explaining his symptoms. I was told that they did not feel that the cervical spine had a lesion that would be causing his weakness so they proceeded to the other imaging studies. MRI showed cortical infarcts, lesion on T2, bony metastases, small epidural tumor. Will hold off on aspirin given unclear if will NSGY recommendations. The patient  could not tolerate being in too long and so had to be brought back and they did not do IV contrast. Electrolytes also obtained and he does have hypomagnesium of 1.1. This may be contributing but unlikely to be causing the profound weakness. Replaced the magnesium as well as the potassium. AST and ALK phos were also elevated which is likely from the metastatic lesions. Discussed the patient with neurosurgery as well as neurology again and neurology will evaluate the patient in the emergency department. Also discussed with neurosurgery P.A. who will review the imaging with her staff Dr. Starks. Dr. Starks had talked to nurse and wanted decadron given which was done. I attempted to call neurosurgery again to get the updated plan and Dr. Starks evaluated the patient in the ED. Did not feel that his weakness could be caused by the lesions on MRI. Did recommend MRI cervical spine with IV contrast, decadron and neurology consult. Dr. Barriga evaluated the patient and thought Guillain [place] could be a possibility. Recommend lumbar puncture by IR and EMG tomorrow. Recommended NIF and if normal, can go to regular inpatient medicine bed. Otherwise will need ICU bed. NIF obtained and slightly abnormal at -45, vital capacity 3 L. Changed bed to IMC bed given slightly abnormal NIF. Clinically he is breathing comfortably. I discussed the patient with the hospitalist who agreed to accept the patient, also updated him multiple times after NSGY and neurology consults.    Diagnosis:    ICD-10-CM   1. Leg weakness, bilateral M62.81       Disposition:  Admitted.    Ronnie BUNCH, am serving as a scribe at 3:29 PM on 1/17/2017 to document services personally performed by Dr. Kelley, based on my observations and the provider's statements to me.     EMERGENCY DEPARTMENT        Arabella Kelley MD  01/17/17 2998

## 2017-01-17 NOTE — IP AVS SNAPSHOT
"[facility]: [phone]                                              INTERAGENCY TRANSFER FORM - PHYSICIAN ORDERS   2017                    Hospital Admission Date: 2017  RODNEY MARIE   : 1945  Sex: Male        Attending Provider: Henry Pantoja DO     Allergies:  Ancef, Penicillins    Infection:  None   Service:  HOSPITALIST    Ht:  1.778 m (5' 10\")   Wt:  77 kg (169 lb 12.1 oz)   Admission Wt:  74.844 kg (165 lb)    BMI:  24.36 kg/m 2   BSA:  1.95 m 2            Patient PCP Information     Provider PCP Type    [facility]      ED Clinical Impression     Diagnosis Description Comment Added By Time Added    Bilateral arm weakness [M62.81] Bilateral arm weakness [M62.81]  Arabella Kelley MD 2017  4:54 PM    Bilateral leg weakness [M62.81] Bilateral leg weakness [M62.81]  Arabella Kelley MD 2017  4:54 PM    Bone metastasis (H) [C79.51] Bone metastasis (H) [C79.51]  Arabella Kelley MD 2017  4:55 PM      Hospital Problems as of 2017              Priority Class Noted POA    Weakness Medium  2017 Yes    Generalized muscle weakness Medium  2017 Yes    Disturbance of skin sensation Medium  2017 Yes    Metastatic cholangiocarcinoma to bone (H) Medium  2017 Yes      Non-Hospital Problems as of 2017              Priority Class Noted    Carcinoma of hepatic duct (H) Medium  2016      Code Status History     Date Active Date Inactive Code Status Order ID Comments User Context    2017 12:21 PM  Full Code [id number]  Chico Brown MD Outpatient    2017  8:29 PM 2017 12:21 PM Full Code [id number]  Henry Pantoja DO Inpatient         Medication Review      CONTINUE these medications which have NOT CHANGED        Dose / Directions Comments    AMLODIPINE BESYLATE PO        Dose:  5 mg   Take 5 mg by mouth daily   Refills:  0        " ATENOLOL PO        Dose:  25 mg   Take 25 mg by mouth daily   Refills:  0        DOCUSATE SODIUM PO        Take by mouth daily   Refills:  0        LISINOPRIL PO        Dose:  10 mg   Take 10 mg by mouth daily   Refills:  0        multivitamin, therapeutic Tabs tablet        Dose:  1 tablet   Take 1 tablet by mouth daily   Refills:  0        PROCHLORPERAZINE MALEATE PO        Dose:  10 mg   Take 10 mg by mouth daily   Refills:  0        sennosides 8.6 MG tablet   Commonly known as:  SENOKOT        Dose:  1 tablet   Take 1 tablet by mouth 2 times daily   Refills:  0        VITAMIN D (CHOLECALCIFEROL) PO        Dose:  400 Units   Take 400 Units by mouth daily   Refills:  0                  Further instructions from your care team       Your risk factors for stroke or TIA (transient ischemic attack):    Your Risk Factors Your Results Normal Ranges   High blood pressure BP Readings from Last 1 Encounters:   01/21/17 122/82    Less than 120/80   Cholesterol              Total CHOL      197   1/18/2017   Less than 150    Triglycerides   TRIG      131   1/18/2017 Less than 150   LDL LDL      117   1/18/2017    Less than 70   HDL HDL       54   1/18/2017         Greater than 40 (men)  Greater than 50 (women)   Diabetes   Recent Labs  Lab 01/20/17  0540   *    Fasting blood glucose    Smoking/tobacco use  Quit smoking and tobacco   Overweight  Lose 1-2 pounds a week   Lack of exercise  30 minutes moderate activity each day   Other risk factors include carotid (neck) artery disease, atrial fibrillation and stress. You may be on new medicine to treat high blood pressure, cholesterol, diabetes or atrial fibrillation.    Understanding Stroke Booklet given to patient. Please refer to booklet for further information.    Stroke warning signs and symptoms - CALL 911 right away for:  - Sudden numbness or weakness in the face, arm or leg (often on one side of the body).  - Sudden confusion or trouble understanding what is  going on.  - Sudden blurred or decreased vision in one or both eyes.  - Sudden trouble speaking, loss of balance, dizziness or problems with coordination.  - Sudden, severe headache for no reason.  - Fainting or seizures.  - Symptoms may go away then come back suddenly.    [facility]: [phone]  [facility]: [phone]  [facility] Nephrology: [phone]      Summary of Visit     Reason for your hospital stay       Extremities weakness possible GB             Statement of Approval     Ordered          01/21/17 1222  I have reviewed and agree with all the recommendations and orders detailed in this document.   EFFECTIVE NOW     Approved and electronically signed by:  Chico Brown MD

## 2017-01-17 NOTE — H&P
[facility]    History and Physical  Hospitalist       Date of Admission:  1/17/2017  Date of Service (when I saw the patient): 01/17/2017    Assessment and Plan  Rick Teixeira is a 71 year old male with past medical history including metastatic cholangiocarcinoma presenting with lower extremity weakness.  Admitted for further evaluation and treatment.      Metastatic cholangiocarcinoma, weakness, concern for progression of metastatic disease vs other (Guillian [place], etc): Patient with diffusely metastatic disease, per report. Follows with [facility] per report, see records scanned into chart review. Presented to the emergency department with complaint of bilateral upper and lower extremity weakness.  MRI of the brain, as well as cervical, thoracic, and lumbar spine show multiple areas of metastases, see report for further details.   - MRI with contrast ordered in ED per report.    - Neurology consultation.   - Oncology consultation.    - Neurosurgery consultation.   - Oncology consultation.   - Radiation oncology consult.    - IR consulted per recommendations from Neurology in order to r/o Guillain [place].    - Therapy evaluations.   - Consider palliative care consultation as clinically indicated.   - Steroids per Neurology discretion.     Small recent cortical brain infarcts: MRI of the brain showed small areas of restricted diffusion in the cortex of the right frontal lobe.  No brain metastases identified, per report.  See report for further details.   - Neurology following, greatly appreciated.   - Permissive hypertension.    - Hold PTA blood pressure medications.     History of hypertension: Patient maintained on amlodipine as well as lisinopril prior to admission.   - Hold PTA Amlodipine    - Hold PTA Lisinopril.    - PRN antihypertensives available.     Hypokalemia: Patient with low potassium and magnesium on admission.   - Monitor and replete.    Anemia, macrocytic, thrombocytopenia,  pancytopenia: MCV is 105.  White blood cell count and platelet count are also reduced.  Hemoglobin is 10.0 on admission.   - Monitor.    DVT Prophylaxis: Pneumatic Compression Devices    Code: Full Code    Disposition: Inpatient status. Due to concern for possible Guillian [place], arranged for [place] bed due to reduced NIF on testing per report.     Dr. Henry Pantoja D.O.  [facility] Hospitalist  Pager [phone]    Primary Care Physician  [facility]    Chief Complaint  Extremity weakness    History is obtained from the patient and medical records.     History of Present Illness  Rick Teixeira is a 71 year old male with past medical history including metastatic cholangiocarcinoma presenting with lower extremity weakness.  Admitted for further evaluation and treatment.  Patient with diffusely metastatic cholangiocarcinoma disease, per report.  Presented to the emergency department with complaint of bilateral upper and lower extremity weakness.  MRI of the brain, as well as cervical, thoracic, and lumbar spine show multiple areas of metastases, see report for further details.  Per report, patient being treated with gemcitabine and cisplatin with last treatment being completed approximately 3 weeks ago.  The patient reports gradually worsening weakness in the extremities since yesterday, prompting his evaluation in the emergency department.  His chemotherapy regimen was recently adjusted due to lack of response from previous treatment regimen, per report.  The patient reports weakness in extremities, as well as numbness in his fingertips and feet, increased back pain.  Denies headache, falls, chest pain, shortness of breath, abdominal pain, nausea, vomiting, dysuria, rash.    Past Medical History   I have reviewed this patient's medical history and updated it with pertinent information if needed.   Past Medical History   Diagnosis Date     Hypertension      Cancer (H)      liver  and lung       Past Surgical History  I have reviewed this patient's surgical history and updated it with pertinent information if needed.  Past Surgical History   Procedure Laterality Date     Genitourinary surgery       testicular cancer       Prior to Admission Medications  Prior to Admission Medications   Prescriptions Last Dose Informant Patient Reported? Taking?   AMLODIPINE BESYLATE PO   Yes Yes   LISINOPRIL PO   Yes Yes      Facility-Administered Medications: None     Allergies  Allergies   Allergen Reactions     Ancef [Cefazolin] Other (See Comments)     Chest heaviness     Penicillins Unknown       Social History  I have reviewed this patient's social history and updated it with pertinent information if needed. Rick Teixeira  reports that he has quit smoking. He does not have any smokeless tobacco history on file. He reports that he does not drink alcohol or use illicit drugs.    Family History  I have reviewed this patient's family history and updated it with pertinent information if needed.     Review of Systems  The 10 point Review of Systems is negative other than noted in the HPI or here.     Physical Exam  Temp: 98.2  F (36.8  C) Temp src: Oral BP: 127/90 mmHg Pulse: 84   Resp: 14 SpO2: 94 % O2 Device: None (Room air)    Vital Signs with Ranges  Temp:  [98.2  F (36.8  C)] 98.2  F (36.8  C)  Pulse:  [] 84  Resp:  [14-18] 14  BP: (127-137)/() 127/90 mmHg  SpO2:  [94 %-100 %] 94 %  165 lbs 0 oz    GENERAL: Alert and oriented. NAD. Conversational, appropriate.   HEENT: Normocephalic. EOMI. No icterus or injection. Nares normal.   LUNGS: Clear to auscultation. No dyspnea at rest.   HEART: Regular rate. Extremities perfused.   ABDOMEN: Soft, nontender, and nondistended. Positive bowel sounds.   EXTREMITIES: No LE edema noted.   NEUROLOGIC: Moves extremities x4 on command. Global weakness present.     Data  Data reviewed today:  I personally reviewed both laboratory and imaging data.      Recent Labs  Lab 01/17/17  1228   WBC 3.7*   HGB 10.0*   *   *   INR 1.11      POTASSIUM 3.3*   CHLORIDE 102   CO2 30   BUN 16   CR 1.17   ANIONGAP 5   SERINA 9.5   GLC 99   ALBUMIN 3.2*   PROTTOTAL 7.2   BILITOTAL 0.8   ALKPHOS 242*   ALT 28   AST 51*       Recent Results (from the past 24 hour(s))   MR Cervical Spine w/o Contrast    Narrative    MR CERVICAL SPINE W/O CONTRAST   1/17/2017 3:03 PM     HISTORY: bilateral UE/LE weakness    TECHNIQUE:  Multiplanar multisequence MRI of the cervical spine  without gadolinium contrast.     COMPARISON:  None.    FINDINGS: Motion artifact degrades quality of these images.  Infiltrative lesions are seen within the C7 and T2 and T3 vertebral  bodies. This is consistent with metastatic disease to these vertebrae.  There is abnormal soft tissue extending from the T2 vertebral body  into the right ventral epidural space and right T2-T3 neural foramen.  This could affect the right T2 nerve root. There is moderate central  spinal stenosis at C6-7 due to bulging disc and associated  osteophytes. There is mild flattening of the cord at this level due to  the degenerative change.    C2-C3:  Normal disc, facet joints, spinal canal and neural foramina.    C3-C4:  Mild degenerative change in the facet joints.    C4-C5:  Mild annular bulge. Central canal still adequate. Moderate  foraminal stenosis due to uncinate spurs and loss of disc space  height.    C5-C6:  Loss of disc space height. Diffuse annular disc bulge. Central  canal mildly narrowed due to the bulging disc. Moderate bilateral  foraminal stenosis due to uncinate spurs.    C6-C7:  Degeneration of the disc. Diffuse annular disc bulge with  associated posterior osteophytes leading to moderate central stenosis  and mild flattening of the cord. There is also moderate bilateral  foraminal stenosis at this level due to loss of disc space height and  uncinate spurs.    C7-T1: Degeneration of the disc. Mild  annular disc bulge. Moderate  bilateral foraminal stenosis due to uncinate spurs.      Impression    IMPRESSION:    1. Study is consistent with bone metastasis to C7, T2, and T3. There  is a small amount of epidural tumor at the right T2 level which also  extends into the right T2-3 neural foramen. This could affect the  right T2 nerve root. No central stenosis is seen at this level.  2. Multilevel degenerative change. The degenerative changes are  leading to moderate central stenosis at C6-7 with mild flattening of  the cord at this level.  3. There is also moderate foraminal stenosis at multiple levels due to  loss of disc space height and uncinate spurs.    KEILY GARCIA MD   MR Thoracic Spine w/o Contrast    Narrative    MR THORACIC SPINE W/O CONTRAST 1/17/2017 3:04 PM     HISTORY: limited movement of UE/LE    TECHNIQUE: Multiplanar multisequence MRI of the thoracic spine without  gadolinium contrast.    COMPARISON: None.    FINDINGS: Inversion recovery images reveal infiltrative lesions within  the the C7, T2 and T12 vertebral bodies. There is also an infiltrative  process in the T9 lamina and spinous process. These are consistent  with metastatic disease. A very small amount of epidural tumor is seen  at the right T3 2 level causing only slight mass effect on the dural  sac. This is better seen on the MR scan of the cervical spine. There  are multilevel degenerative changes with loss of disc space height  throughout the thoracic spine. Mild bulging discs are seen at T4-5,  C5-6, and there is a small central disc protrusion at T6-7.      Impression    IMPRESSION:    1. Infiltrative lesions within the C7 and T2, and T12 vertebral bodies  and within the T9 lamina and spinous process. This is consistent with  metastatic disease.  2. Small amount of epidural tumor at the T2 level but no cord  compression or central stenosis.  3. Multilevel degenerative change.    KEILY GARCIA MD   MR Brain w/o Contrast    Narrative     MR BRAIN W/O CONTRAST 1/17/2017 3:04 PM     HISTORY: bilateral UE/LE weakness    TECHNIQUE: Multiplanar, multisequence MRI of the brain without IV  contrast material. No contrast was administered because of the length  of the examination. The patient was becoming uncomfortable.    COMPARISON: None.    FINDINGS: Motion artifact degrades quality of these images.  There is  generalized atrophy of the brain. . There are small areas of  restricted diffusion in the cortex of the right frontal lobe. These  measure about 4 mm in size. These would be compatible with recent  cortical infarcts. No lesions are seen in the cerebellum or left  hemisphere.. . The facial structures appear normal. The arteries at  the base of the brain and the dural venous sinuses appear patent. No  brain metastasis are identified. No mass lesions are seen.      Impression    IMPRESSION:   1. Small areas of restricted diffusion in the cortex of the right  frontal lobe. These are consistent with small recent cortical  infarcts.  2. No brain metastasis are identified. No mass lesions are seen.  3. Generalized atrophy of the brain. .  4. No skull metastasis are identified.     KEILY GARCIA MD   MR Lumbar Spine w/o Contrast    Narrative    MR LUMBAR SPINE WITHOUT CONTRAST1/17/2017 2:59 PM      HISTORY: History of cancer, increasing weakness.    TECHNIQUE:  Multiplanar, multisequence MRI of the lumbar spine without  contrast.     COMPARISON: None.    FINDINGS:This report assumes five lumbar type vertebrae.     The conus and visualized portions of the thoracic spinal cord appear  normal. The paraspinal soft tissues appear normal as visualized. The  bone marrow has normal signal intensity. No bone metastasis are seen  in the lumbar region. No central spinal stenosis or cord compression.    L1-L2:   Normal disc, facet joints, spinal canal and neural foramina.    L2-L3:  Normal disc, facet joints, spinal canal and neural foramina.    L3-L4:  Degeneration of  the disc. Diffuse annular disc bulge. There is  a small more right central disc herniation causing mild mass effect on  the dural sac. Central canal is mildly narrowed due to this right  central disc herniation. This herniation extends slightly inferior to  the level of the disc and posterior to the L4 vertebral body.    L4-L5:  Degeneration of the disks. Loss of disc space height. Diffuse  annular disc bulge. There is disc material extending into the right  and left neural foramen causing moderate bilateral foraminal stenosis.  Mild central stenosis due to the bulging disc and degenerative change  off the facet joints.    L5-S1:  Normal disc, facet joints, spinal canal and neural foramina.      Impression    IMPRESSION:   1. No bone metastasis are seen in the lumbar region.  2. Multilevel degenerative change.  3. Moderate size right central disc herniation at L3-L4 causing mild  mass effect on the dural sac. This could affect the right L5 nerve  root as it arises from the sac. It is also causing mild central  stenosis.  4. Moderate bilateral L4-5 foraminal stenosis due to a bulging disc  and degenerative change off the facet joints. There is also moderate  central stenosis and lateral recess stenosis at this level due to  degenerative change off the facet joints and a bulging disc.    KEILY GARCIA MD

## 2017-01-17 NOTE — ED NOTES
Bed: ED14  Expected date:   Expected time:   Means of arrival:   Comments:  421 - 71 M weakness eta 1213

## 2017-01-17 NOTE — IP AVS SNAPSHOT
` ` Patient Information     Patient Name Sex     Rick Teixeira ([id number]) Male 1945       Room Bed    [facility] 0739-01      Patient Demographics     Address Phone    [address], [place] [address] [phone] (Home)  [phone] (Mobile)      Patient Ethnicity & Race     Ethnic Group Patient Race    American White      Emergency Contact(s)     Name Relation Home Work Mobile    mayank jarquin Spouse [phone]       Documents on File        Status Date Received Description       Documents for the Patient    Consent for EHR Access Sent 16     Insurance Card Received 17     External Medication Information Consent       Patient ID       [facility] Specified Other       Consent for Services/Privacy Notice - Hospital/Clinic Received 17     Privacy Notice - [place] Received 17     Consent for Services/Privacy Notice - Hospital/Clinic  16 CONSENT FOR SERVICE    Consent for EHR Access  16 CONSENT TO SHARE ELECTRONIC HEALTH RECORDS (E H R)    Advance Directives and Living Will Not Received  Validation of AD 2017    Advance Directives and Living Will Received 17 Health Care Directive 2017       Documents for the Encounter    CMS IM for Patient Signature Received 17 2MM      Admission Information     Attending Provider Admitting Provider Admission Type Admission Date/Time    Henry Pantoja DO Anderson, Zachary Kim,  Emergency 17  1214    Discharge Date Hospital Service Auth/Cert Status Service Area     Hospitalist [facility] SERVICES    Unit Room/Bed Admission Status     [address] [facility] 0739/0739-01 Admission (Confirmed)            Admission     Complaint    Weakness      Hospital Account     Name Acct ID Class Status Primary Coverage    Rick Teixeira [id number] Inpatient Open BCBS [place]            Guarantor Account (for Hospital Account #[id number])     Name Relation to Pt  Service Area Active? Acct Type    Rick Texieira Self FCS Yes Personal/Family    Address Phone          [address] [place] [id number] [phone](H)              Coverage Information (for Hospital Account #[id number])     1. [place]     F/O Payor/Plan Precert #    [place]     Subscriber Subscriber #    Rick Teixeira [id number]    Address Phone    [address] [address]  [place] [address] [phone]          2. MEDICARE/MEDICARE PT A ONLY     F/O Payor/Plan Precert #    MEDICARE/MEDICARE PT A ONLY     Subscriber Subscriber #    Rick Teixeira [id number]    Address Phone    [employer]  [address]  [place] [address] [phone]

## 2017-01-18 PROBLEM — R20.9 DISTURBANCE OF SKIN SENSATION: Status: ACTIVE | Noted: 2017-01-01

## 2017-01-18 PROBLEM — M62.81 GENERALIZED MUSCLE WEAKNESS: Status: ACTIVE | Noted: 2017-01-01

## 2017-01-18 PROBLEM — C22.1: Status: ACTIVE | Noted: 2017-01-01

## 2017-01-18 PROBLEM — C79.51: Status: ACTIVE | Noted: 2017-01-01

## 2017-01-18 NOTE — PLAN OF CARE
"Problem: Goal Outcome Summary  Goal: Goal Outcome Summary  Outcome: No Change  VSS, afebrile. Denies pain. Become confused at 0400, no further change in neuro's. Extremities continue to be very weak and unable to lift up much. Getting angry and agitated because we are not letting him get up out of bed or walk to the bathroom. Saying \"I will call the  and put you in prison\". Unable to reorient or reason with for very long. Offered urinal many times and had used urinal appropriately late last night but was incontinent x1. Patient did fall back asleep after an hour.   Tele- Afib CVR. Plan lumbar puncture this morning to rule out Guillain [place].           "

## 2017-01-18 NOTE — PROGRESS NOTES
01/18/17 1601   General Information   Onset Date 01/17/17   Start of Care Date 01/18/17   Referring Physician Enrique Ahn MD   Patient Profile Review/OT: Additional Occupational Profile Info See Profile for full history and prior level of function   Patient/Family Goals Statement To figure out what is going on.    Swallowing Evaluation Bedside swallow evaluation   Behaviorial Observations WFL (within functional limits)   Mode of current nutrition Oral diet   Type of oral diet Regular;Thin liquid   Respiratory Status Room air   Comments Rick Teixeira is a 71 year old male with past medical history including metastatic cholangiocarcinoma presenting with lower extremity weakness. He was admitted for further evaluation and treatment. Per chart review, possible differential diagnosis is Guillan-Wyalusing syndrome. Upon clinical interview, pt denied any history of swallowing difficulties. Reported tolerating a regular diet and thin liquids at home.    Clinical Swallow Evaluation   Oral Musculature generally intact   Structural Abnormalities none present   Dentition present and adequate   Mucosal Quality good   Mandibular Strength and Mobility intact   Oral Labial Strength and Mobility WFL   Lingual Strength and Mobility other (see comments)  (twitching-like movements noted when protruded tongue)   Velar Elevation intact   Laryngeal Function Cough;Throat clear;Swallow;Voicing initiated   Oral Musculature Comments Generally WFL. Small twitching-like movements noted when protruded tongue.    Additional Documentation Yes   Clinical Swallow Eval: Thin Liquid Texture Trial   Mode of Presentation, Thin Liquids cup;straw   Volume of Liquid or Food Presented sips from cup x3, sips from straw x6   Oral Phase of Swallow WFL   Pharyngeal Phase of Swallow intact   Diagnostic Statement No overt s/sx of aspiration. Prompt swallow response with adequate hyolaryngeal elevation upon manual palpation.    Clinical Swallow Eval: [place]  Solid Texture Trial   Mode of Presentation, Puree spoon   Volume of Puree Presented teaspoons x3   Oral Phase, Puree WFL   Pharyngeal Phase, Puree wet vocal quality after swallow  (delayed)   Successful Strategies Trialed During Procedure, Puree other (see comments)  (volitional throat clear eliminated wet vocal quality)   Diagnostic Statement No coughing, choking, or throat clearing. Delayed wet vocal quality x1 after completion of puree trials. Voice back to baseline when cued to use volitional throat clear. Pt denied feeling of pharyngeal stasis.    Clinical Swallow Eval: Solid Food Texture Trial   Mode of Presentation, Solid fed by clinician   Volume of Solid Food Presented 1 saltine cracker   Oral Phase, Solid WFL   Pharyngeal Phase, Solid intact   Diagnostic Statement Mastication WFL. No oral residue. Denied feeling of pharyngeal stasis. No overt s/sx of aspiration.    Esophageal Phase of Swallow   Patient reports or presents with symptoms of esophageal dysphagia No   Swallow Eval: Clinical Impressions   Skilled Criteria for Therapy Intervention No problems identified which require skilled intervention   Functional Assessment Scale (FAS) 7   Treatment Diagnosis Normal oral and pharyngeal swallow   Diet texture recommendations Regular diet;Thin liquids   Recommended Feeding/Eating Techniques small sips/bites;other (see comments)  (slow pacing, regular oral cares, sit upright at 90 degrees)   Anticipated Discharge Disposition other (see comments)  (defer to OT/PT and medical team)   Risks and Benefits of Treatment have been explained. Yes   Patient, family and/or staff in agreement with Plan of Care Yes   Clinical Impression Comments Clinical swallow evaluation completed per MD request. Pt presents with normal oral and pharyngeal swallowing function. Mastication WFL. Swallow response is prompt with adequate hyolaryngeal elevation upon manual palpation. No overt s/sx of aspiration with sips of thin liquid by  cup/straw or dry solid texture. Isolated incidence of delayed gurgly vocal quality after trial of puree which cleared with volitional throat clear. Pt is at increased risk of aspiration given need for full assistance for feeding 2/2 UE weakness. Recommend continue regular diet and thin liquids. Swallowing/feeding precautions include pt awake/alert, sit up at 90 degrees/in chair, small bites/sips, slow pace, swallow all of oral contents prior to next bite/sip, alternate solids/liquids as needed, regular oral cares. No further SLP needs are identified at this time. SLP will sign off. Thank you for involvement in this pt's care. Please re-consult if needs arise in the future. Defer discharge recommendations to OT/PT.    Total Evaluation Time   Total Evaluation Time (Minutes) 12

## 2017-01-18 NOTE — CODE/RAPID RESPONSE
House MD note. Re: Confusion.    71 year old male with metastatic cholangiocarcinoma admitted to [facility] this evening with weakness, started on Decadron and has received 16 mg's in the past 12 hours. His nurse noted that he is more confused than he was on admission. ( H & P reviewed. ) On questioning him, he denies pain, is calm and cooperative, but is disoriented to the time, thinks it's 1940. His VS are stable. I suspect he will have bouts of confusion with his underlying illness and now with high dose steroids he is at risk of having AMS. There is no need for any further workup at this hour in my opinion and will defer further treatment plan to his PMD this AM.    You Ahn MD  1/18/2017  4:46 AM

## 2017-01-18 NOTE — PHARMACY-ADMISSION MEDICATION HISTORY
Admission medication history interview status for the 1/17/2017  admission is complete. See EPIC admission navigator for prior to admission medications     Medication history source reliability:Moderate    Actions taken by pharmacist (provider contacted, etc): Referenced CareEverywhere records     Additional medication history information not noted on PTA med list :None    Medication reconciliation/reorder completed by provider prior to medication history? No    Time spent in this activity: 20 min    Prior to Admission medications    Medication Sig Last Dose Taking? Auth Provider   AMLODIPINE BESYLATE PO Take 5 mg by mouth daily  1/17/2017 at am Yes Reported, Patient   LISINOPRIL PO Take 10 mg by mouth daily  1/17/2017 at am Yes Reported, Patient   ATENOLOL PO Take 25 mg by mouth daily 1/17/2017 at am Yes Unknown, Entered By History   DOCUSATE SODIUM PO Take by mouth daily 1/17/2017 at Unknown time Yes Unknown, Entered By History   sennosides (SENOKOT) 8.6 MG tablet Take 1 tablet by mouth 2 times daily 1/17/2017 at am Yes Unknown, Entered By History   VITAMIN D, CHOLECALCIFEROL, PO Take 400 Units by mouth daily 1/17/2017 at am Yes Unknown, Entered By History   multivitamin, therapeutic (THERA-VIT) TABS tablet Take 1 tablet by mouth daily 1/17/2017 at am Yes Unknown, Entered By History   PROCHLORPERAZINE MALEATE PO Take 10 mg by mouth daily  1/17/2017 at am Yes Unknown, Entered By History

## 2017-01-18 NOTE — PLAN OF CARE
Problem: Goal Outcome Summary  Goal: Goal Outcome Summary  Outcome: Improving  Vitally stable ex DBP hypertensive in 90s to low 100s.   Tele: Afib with CVR  Resp: diminished in bases, stable in mid 90s on room air  GI: passing flatus, no BM, softeners given. NPO for LP this AM. Regular diet dependent on swallow evaluation.   : Voids using urinal  Neuro/CMS: Oriented x4. Neurologically intact ex extremity weakness. Pt unable to lift arms or legs off bed. Pt is able to move left fingers, but unable to move right fingers. Bedfast  Lumbar puncture site to back is C/D/I with bandaid covering.   EMG done, bubble study in progress. Neurology to see patient this afternoon.

## 2017-01-18 NOTE — PROGRESS NOTES
Call out to Dr. Barriga office to clarify EMG order. Spoke to MD's nurse coordinator and she will call me back.    1118 I received a call from Juni the coordinator with Dr. Huang and she clarified the EMG order, I called Teri back at [facility]  ([phone] option 2) and she has all the information she needs as her techs called the MD and clarified the order.

## 2017-01-18 NOTE — PROGRESS NOTES
"[facility]    Neurosurgery Progress Note    Date of Service (when I saw the patient): 01/18/2017     Assessment and Plan  Rick Teixeira is a 71 year old male who was admitted on 1/17/2017 with complaints of upper and lower extremity weakness for the past 2-3 days.  His history is significant for cholangiocarcinoma and lung cancer.  Dr. Starks met with the patient yesterday and the patient had told him his life expectancy was only a \"few months.\"  He states his weakness is greater on the right when compared to the left.  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  Not recommending surgery now. Await recommendations per Radiation Oncology.      I have discussed the following assessment and plan with Dr. Starks who is in agreement with initial plan and will follow up with further consultation recommendations.    [facility]  [facility]  [facility]  [address]  [address]  [place] [address]    Tel [phone]  Pager [phone]      Interval History  No changes    Physical Exam  Temp: 97.4  F (36.3  C) Temp src: Axillary BP: (!) 128/91 mmHg Pulse: 84 Heart Rate: 101 Resp: 15 SpO2: 98 % O2 Device: None (Room air)    Filed Vitals:    01/17/17 1215 01/18/17 0430   Weight: 165 lb (74.844 kg) 167 lb 12.3 oz (76.1 kg)     Vital Signs with Ranges  Temp:  [97.4  F (36.3  C)-98.2  F (36.8  C)] 97.4  F (36.3  C)  Pulse:  [] 84  Heart Rate:  [] 101  Resp:  [10-18] 15  BP: (123-152)/() 128/91 mmHg  SpO2:  [90 %-100 %] 98 %  I/O last 3 completed shifts:  In: 300 [P.O.:300]  Out: 450 [Urine:450]    Heart Rate: 101, Blood pressure 128/91, pulse 84, temperature 97.4  F (36.3  C), temperature source Axillary, resp. rate 15, height 5' 10\" (1.778 m), weight 167 lb 12.3 oz (76.1 kg), SpO2 98 %.  167 lbs 12.32 " oz  HEENT:  Normocephalic, atraumatic.     Neck:  Supple, non-tender, without lymphadenopathy.  Heart:  No peripheral edema  Lungs:  No SOB  Skin:  Warm and dry, good capillary refill.  Extremities:  Good radial and dorsalis pedis pulses bilaterally, no edema, cyanosis or clubbing.    NEUROLOGICAL EXAMINATION:     Mental status:  Alert and Oriented x 3, speech is fluent.  Cranial nerves:  II-XII intact.   Motor:  RUE 3/5 LUE 2/5  DF 4-/5 bilaterally, PF 4/5 bilaterally  Sensation: Some decreased sensation to patchy distribution to light touch  Reflexes:  Negative Babinski.  Negative Clonus.    Gait:  Deferred    Medications       sodium chloride (PF)  3 mL Intracatheter Q8H     docusate sodium  100 mg Oral Daily     multivitamin, therapeutic  1 tablet Oral Daily     sennosides  1 tablet Oral BID     cholecalciferol  400 Units Oral Daily       Data    CBC RESULTS:   Recent Labs   Lab Test  01/18/17   0510   WBC  4.3   RBC  3.06*   HGB  10.7*   HCT  31.6*   MCV  103*   MCH  35.0*   MCHC  33.9   RDW  16.5*   PLT  155     Basic Metabolic Panel:  NA      137   1/18/2017   POTASSIUM      4.6   1/18/2017  CHLORIDE      103   1/18/2017  SERINA      9.1   1/18/2017  CO2       25   1/18/2017  BUN       21   1/18/2017  CR     1.16   1/18/2017  GLC      185   1/18/2017  INR:  INR     1.11   1/17/2017

## 2017-01-18 NOTE — PROGRESS NOTES
[facility]    Neurology Progress Note    Date of Service (when I saw the patient): 01/18/2017     Today's developments: Continued severe weakness.  No bulbar symptoms at this time.  NIF/FVC stable.  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    Assessment and Plan  Rick Teixeira is a 71 year old male who was admitted on 1/17/2017. I was asked to see the patient for generalized weakness.      #. 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  --NIF, FVC BID- stable so far, more often if respiratory weakness or abnormal values found  #anemic- may add to weakness.  #. 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  Urinated without problems.  Apparently some confusion/encephalopathy overnight, cleared today.  Denies respiratory distress or swallowing or speech problems    Physical Exam  Temp: 98.1  F (36.7  C)  Temp src: Axillary BP: 119/87 mmHg Pulse: 84 Heart Rate: 81 Resp: 17 SpO2: 97 % O2 Device: None (Room air)    Filed Vitals:    01/17/17 1215 01/18/17 0430   Weight: 74.844 kg (165 lb) 76.1 kg (167 lb 12.3 oz)     Vital Signs with Ranges  Temp:  [97.4  F (36.3  C)-98.2  F (36.8  C)] 98.1  F (36.7  C)  Pulse:  [84] 84  Heart Rate:  [] 81  Resp:  [9-25] 17  BP: (119-152)/() 119/87 mmHg  SpO2:  [90 %-98 %] 97 %  I/O last 3 completed shifts:  In: 720 [P.O.:720]  Out: 650 [Urine:650]      General Appearance:  No acute distress  Neuro:       Mental Status Exam:   Fully oriented       Cranial Nerves:  EOMI, face equal and symmetric, speech normal       Motor:   2/5 B biceps, delt, wrist extension, 3/5 finger movements.  B 2/5 hip flexor, 3/5 knee flexion, 3/5 dorsiflexion, 4/5 plantarflexion       Reflexes:  0 throughout  CV: RRR nl s1, s2  Resp: CTAB    Extremities: No clubbing, no cyanosis, no edema    Medications    - MEDICATION INSTRUCTIONS -         sodium chloride (PF)  3 mL Intracatheter Q8H     docusate sodium  100 mg Oral Daily     multivitamin, therapeutic  1 tablet Oral Daily     sennosides  1 tablet Oral BID     cholecalciferol  400 Units Oral Daily       Data  Results for orders placed or performed during the hospital encounter of 01/17/17 (from the past 24 hour(s))   EKG 12 lead   Result Value Ref Range    Interpretation ECG Click View Image link to view waveform and result    Neurology IP Consult: Patient to be seen: Routine - within 24 hours; extremity weakness; Consultant may enter orders: Yes    Narrative    Patsy Gauthier MD     1/18/2017  7:34 AM  [facility]    Neurology Consultation     Date of Admission:  1/17/2017  Date of Consult (When I saw the patient): 01/17/2017    Assessment and Plan  Rick Teixeira is a 71 year old male who was admitted on   1/17/2017. I was asked to see the patient for generalized   weakness.      #. 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  #. 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    Patsy Gauthier MD    Code Status   No Order        Reason for Consult  Reason for consult: I was asked by Dr. Pantoja to evaluate this   patient for severe weakness.      Chief Complaint  Weak all over    History is obtained from the patient and electronic chart    History of Present Illness  Rick Teixeira is a 71 year old male with metastatic   cholangiocarinoma who presents with rapid/subacute onset of   weakness BUE and BLE over 2 days.  He has been unable to walk for   the past 2 days.  Notes weakness in the arms and legs.  Continued   chronic mid back pain, no other significant new pain, no muscle   pain.  He notes some numbness as well.  No bowel/bladder problems   but was unable to urinate in the ER today.  He denies any facial   weakness, no slurred speech, no problems breathing.  No recent   illness- no URI or diarrhea.  No recent fevers or feeling ill.  Last chemo was 2 weeks ago.  He has plans for starting new chemo   next week.    Past Medical History  I have reviewed this patient's medical history and updated it   with pertinent information if needed.   Past  Medical History   Diagnosis Date     Hypertension      Cancer (H)      liver and lung       Past Surgical History  I have reviewed this patient's surgical history and updated it   with pertinent information if needed.  Past Surgical History   Procedure Laterality Date     Genitourinary surgery       testicular cancer       Prior to Admission Medications  Prior to Admission Medications   Prescriptions Last Dose Informant Patient Reported? Taking?   AMLODIPINE BESYLATE PO   Yes Yes   LISINOPRIL PO   Yes Yes      Facility-Administered Medications: None     Allergies  Allergies   Allergen Reactions     Ancef [Cefazolin] Other (See Comments)     Chest heaviness     Penicillins Unknown       Social History  I have reviewed this patient's social history and updated it with   pertinent information if needed. Rick Teixeira  reports that   he has quit smoking. He does not have any smokeless tobacco   history on file. He reports that he does not drink alcohol or use   illicit drugs.    Family History  I have reviewed this patient's family history and updated it with   pertinent information if needed.   No significant neuro history.     Review of Systems  The 10 point Review of Systems is negative other than noted in   the HPI.    Physical Exam  Temp: 98.2  F (36.8  C) Temp src: Oral BP: (!) 133/101 mmHg   Pulse: 84   Resp: 14 SpO2: 94 % O2 Device: None (Room air)    Vital Signs with Ranges  Temp:  [98.2  F (36.8  C)] 98.2  F (36.8  C)  Pulse:  [] 84  Resp:  [14-18] 14  BP: (124-137)/() 133/101 mmHg  SpO2:  [94 %-100 %] 94 %  165 lbs 0 oz    General Appearance:  No acute distress  Neuro:       Mental Status Exam:  A,A, fully oriented- able to discuss in   detail his chemo treatments, symptoms.       Cranial Nerves: CN 2-12 examined and intact       Motor:  2/5 B prox arms, biceps, 3/5B wrist flexors and hand   , 2/5 B hip flexors, knee flexors, 3/5 B dorsi and plantar   flexion       Reflexes:  0 throughout,  downgoing toes, no clonus       Sensory:  Decreased pinprick B arms, R foot up to above   knee, L foot up to below knee       Coordination:  Unable to assess- too weak       Gait:  Unable to assess- too weak  Neck: no nuchal rigidity. No carotid bruits.    Extremities: No clubbing, no cyanosis, no edema  CV: RRR nl s1, s2  Resp: CTAB        Data  Results for orders placed or performed during the hospital   encounter of 01/17/17 (from the past 24 hour(s))   Basic metabolic panel   Result Value Ref Range    Sodium 137 133 - 144 mmol/L    Potassium 3.3 (L) 3.4 - 5.3 mmol/L    Chloride 102 94 - 109 mmol/L    Carbon Dioxide 30 20 - 32 mmol/L    Anion Gap 5 3 - 14 mmol/L    Glucose 99 70 - 99 mg/dL    Urea Nitrogen 16 7 - 30 mg/dL    Creatinine 1.17 0.66 - 1.25 mg/dL    GFR Estimate 62 >60 mL/min/1.7m2    GFR Estimate If Black 75 >60 mL/min/1.7m2    Calcium 9.5 8.5 - 10.1 mg/dL   CBC (+ platelets, no diff)   Result Value Ref Range    WBC 3.7 (L) 4.0 - 11.0 10e9/L    RBC Count 2.87 (L) 4.4 - 5.9 10e12/L    Hemoglobin 10.0 (L) 13.3 - 17.7 g/dL    Hematocrit 30.0 (L) 40.0 - 53.0 %     (H) 78 - 100 fl    MCH 34.8 (H) 26.5 - 33.0 pg    MCHC 33.3 31.5 - 36.5 g/dL    RDW 16.8 (H) 10.0 - 15.0 %    Platelet Count 144 (L) 150 - 450 10e9/L   Magnesium   Result Value Ref Range    Magnesium 1.1 (L) 1.6 - 2.3 mg/dL   INR   Result Value Ref Range    INR 1.11 0.86 - 1.14   Hepatic panel   Result Value Ref Range    Bilirubin Direct 0.3 (H) 0.0 - 0.2 mg/dL    Bilirubin Total 0.8 0.2 - 1.3 mg/dL    Albumin 3.2 (L) 3.4 - 5.0 g/dL    Protein Total 7.2 6.8 - 8.8 g/dL    Alkaline Phosphatase 242 (H) 40 - 150 U/L    ALT 28 0 - 70 U/L    AST 51 (H) 0 - 45 U/L   MR Cervical Spine w/o Contrast    Narrative    MR CERVICAL SPINE W/O CONTRAST   1/17/2017 3:03 PM     HISTORY: bilateral UE/LE weakness    TECHNIQUE:  Multiplanar multisequence MRI of the cervical spine  without gadolinium contrast.     COMPARISON:  None.    FINDINGS: Motion  artifact degrades quality of these images.  Infiltrative lesions are seen within the C7 and T2 and T3   vertebral  bodies. This is consistent with metastatic disease to these   vertebrae.  There is abnormal soft tissue extending from the T2 vertebral   body  into the right ventral epidural space and right T2-T3 neural   foramen.  This could affect the right T2 nerve root. There is moderate   central  spinal stenosis at C6-7 due to bulging disc and associated  osteophytes. There is mild flattening of the cord at this level   due to  the degenerative change.    C2-C3:  Normal disc, facet joints, spinal canal and neural   foramina.    C3-C4:  Mild degenerative change in the facet joints.    C4-C5:  Mild annular bulge. Central canal still adequate.   Moderate  foraminal stenosis due to uncinate spurs and loss of disc space  height.    C5-C6:  Loss of disc space height. Diffuse annular disc bulge.   Central  canal mildly narrowed due to the bulging disc. Moderate bilateral  foraminal stenosis due to uncinate spurs.    C6-C7:  Degeneration of the disc. Diffuse annular disc bulge with  associated posterior osteophytes leading to moderate central   stenosis  and mild flattening of the cord. There is also moderate bilateral  foraminal stenosis at this level due to loss of disc space height   and  uncinate spurs.    C7-T1: Degeneration of the disc. Mild annular disc bulge.   Moderate  bilateral foraminal stenosis due to uncinate spurs.      Impression    IMPRESSION:    1. Study is consistent with bone metastasis to C7, T2, and T3.   There  is a small amount of epidural tumor at the right T2 level which   also  extends into the right T2-3 neural foramen. This could affect the  right T2 nerve root. No central stenosis is seen at this level.  2. Multilevel degenerative change. The degenerative changes are  leading to moderate central stenosis at C6-7 with mild flattening   of  the cord at this level.  3. There is also moderate  foraminal stenosis at multiple levels   due to  loss of disc space height and uncinate spurs.    KEILY GARCIA MD   MR Thoracic Spine w/o Contrast    Narrative    MR THORACIC SPINE W/O CONTRAST 1/17/2017 3:04 PM     HISTORY: limited movement of UE/LE    TECHNIQUE: Multiplanar multisequence MRI of the thoracic spine   without  gadolinium contrast.    COMPARISON: None.    FINDINGS: Inversion recovery images reveal infiltrative lesions   within  the the C7, T2 and T12 vertebral bodies. There is also an   infiltrative  process in the T9 lamina and spinous process. These are   consistent  with metastatic disease. A very small amount of epidural tumor is   seen  at the right T3 2 level causing only slight mass effect on the   dural  sac. This is better seen on the MR scan of the cervical spine.   There  are multilevel degenerative changes with loss of disc space   height  throughout the thoracic spine. Mild bulging discs are seen at   T4-5,  C5-6, and there is a small central disc protrusion at T6-7.      Impression    IMPRESSION:    1. Infiltrative lesions within the C7 and T2, and T12 vertebral   bodies  and within the T9 lamina and spinous process. This is consistent   with  metastatic disease.  2. Small amount of epidural tumor at the T2 level but no cord  compression or central stenosis.  3. Multilevel degenerative change.    KEILY GARCIA MD   MR Brain w/o Contrast    Narrative    MR BRAIN W/O CONTRAST 1/17/2017 3:04 PM     HISTORY: bilateral UE/LE weakness    TECHNIQUE: Multiplanar, multisequence MRI of the brain without IV  contrast material. No contrast was administered because of the   length  of the examination. The patient was becoming uncomfortable.    COMPARISON: None.    FINDINGS: Motion artifact degrades quality of these images.    There is  generalized atrophy of the brain. . There are small areas of  restricted diffusion in the cortex of the right frontal lobe.   These  measure about 4 mm in size. These would  be compatible with recent  cortical infarcts. No lesions are seen in the cerebellum or left  hemisphere.. . The facial structures appear normal. The arteries   at  the base of the brain and the dural venous sinuses appear patent.   No  brain metastasis are identified. No mass lesions are seen.      Impression    IMPRESSION:   1. Small areas of restricted diffusion in the cortex of the right  frontal lobe. These are consistent with small recent cortical  infarcts.  2. No brain metastasis are identified. No mass lesions are seen.  3. Generalized atrophy of the brain. .  4. No skull metastasis are identified.     KEILY GARCIA MD   MR Lumbar Spine w/o Contrast    Narrative    MR LUMBAR SPINE WITHOUT CONTRAST1/17/2017 2:59 PM      HISTORY: History of cancer, increasing weakness.    TECHNIQUE:  Multiplanar, multisequence MRI of the lumbar spine   without  contrast.     COMPARISON: None.    FINDINGS:This report assumes five lumbar type vertebrae.     The conus and visualized portions of the thoracic spinal cord   appear  normal. The paraspinal soft tissues appear normal as visualized.   The  bone marrow has normal signal intensity. No bone metastasis are   seen  in the lumbar region. No central spinal stenosis or cord   compression.    L1-L2:   Normal disc, facet joints, spinal canal and neural   foramina.    L2-L3:  Normal disc, facet joints, spinal canal and neural   foramina.    L3-L4:  Degeneration of the disc. Diffuse annular disc bulge.   There is  a small more right central disc herniation causing mild mass   effect on  the dural sac. Central canal is mildly narrowed due to this right  central disc herniation. This herniation extends slightly   inferior to  the level of the disc and posterior to the L4 vertebral body.    L4-L5:  Degeneration of the disks. Loss of disc space height.   Diffuse  annular disc bulge. There is disc material extending into the   right  and left neural foramen causing moderate bilateral  foraminal   stenosis.  Mild central stenosis due to the bulging disc and degenerative   change  off the facet joints.    L5-S1:  Normal disc, facet joints, spinal canal and neural   foramina.      Impression    IMPRESSION:   1. No bone metastasis are seen in the lumbar region.  2. Multilevel degenerative change.  3. Moderate size right central disc herniation at L3-L4 causing   mild  mass effect on the dural sac. This could affect the right L5   nerve  root as it arises from the sac. It is also causing mild central  stenosis.  4. Moderate bilateral L4-5 foraminal stenosis due to a bulging   disc  and degenerative change off the facet joints. There is also   moderate  central stenosis and lateral recess stenosis at this level due to  degenerative change off the facet joints and a bulging disc.    KEILY GARCIA MD   EKG 12 lead   Result Value Ref Range    Interpretation ECG Click View Image link to view waveform and   result            Imaging and procedures:  I personally reviewed these images.  MRI brain: very small areas R cortical infarcts  MRI Cspine: bone mets C7, T2, T3; epidural tumor T2; moderate   central stenosis c6-7  MRI Tspine: as above mets and T2 tumor  MRI L spine: mod R L3-4 disk, no mets         MR Cervical Spine w Contrast    Narrative    MR CERVICAL SPINE WITH CONTRAST 1/17/2017 9:39 PM     HISTORY: Metastatic disease. Evaluation needed with contrast for  treatment purposes.    TECHNIQUE: Postcontrast images were obtained through the cervical  spine with 7 mL of Gadavist.    COMPARISON: MR cervical spine without contrast on same date.    FINDINGS: Postcontrast images show enhancement of the bone lesions in  the C7 and T2 vertebral bodies consistent with osseous metastases. In  addition, there is a small amount of enhancing epidural tumor at the  T2 level in the right anterolateral epidural space causing some mild  central canal stenosis but no cord deformity. There is also enhancing  soft tissue in  the right C6-C7 and C7-T1 neural foramen as well as the  right T2-T3 neural foramen consistent with some tumor. There is also a  small amount of epidural tumor in the right anterior epidural space at  C7. There is no evidence for any intradural tumor any intramedullary  tumor. Degenerative changes are also superimposed most advanced at  C6-C7 where there is moderate central canal and bilateral neural  foraminal stenosis along with mild cord deformity. This is just above  the small amount of epidural tumor at C7.      Impression    IMPRESSION: Osseous metastases at C7 and T1 with some epidural tumor  in the right anterior lateral epidural spaces at these levels as well  as some epidural tumor in the right side of neural foramen at C6-C7,  C7-T1, and T2-T3. The epidural tumor is not causing any cord  impingement at this time. There is moderate facet degenerative central  canal stenosis at C6-C7 with some mild cord deformity.    SO MALONE MD   EKG 12-lead, tracing only   Result Value Ref Range    Interpretation ECG Click View Image link to view waveform and result    Comprehensive metabolic panel   Result Value Ref Range    Sodium 137 133 - 144 mmol/L    Potassium 4.6 3.4 - 5.3 mmol/L    Chloride 103 94 - 109 mmol/L    Carbon Dioxide 25 20 - 32 mmol/L    Anion Gap 9 3 - 14 mmol/L    Glucose 185 (H) 70 - 99 mg/dL    Urea Nitrogen 21 7 - 30 mg/dL    Creatinine 1.16 0.66 - 1.25 mg/dL    GFR Estimate 62 >60 mL/min/1.7m2    GFR Estimate If Black 75 >60 mL/min/1.7m2    Calcium 9.1 8.5 - 10.1 mg/dL    Bilirubin Total 0.8 0.2 - 1.3 mg/dL    Albumin 3.1 (L) 3.4 - 5.0 g/dL    Protein Total 7.3 6.8 - 8.8 g/dL    Alkaline Phosphatase 243 (H) 40 - 150 U/L    ALT 33 0 - 70 U/L    AST 79 (H) 0 - 45 U/L   CBC with platelets   Result Value Ref Range    WBC 4.3 4.0 - 11.0 10e9/L    RBC Count 3.06 (L) 4.4 - 5.9 10e12/L    Hemoglobin 10.7 (L) 13.3 - 17.7 g/dL    Hematocrit 31.6 (L) 40.0 - 53.0 %     (H) 78 - 100 fl    MCH 35.0  (H) 26.5 - 33.0 pg    MCHC 33.9 31.5 - 36.5 g/dL    RDW 16.5 (H) 10.0 - 15.0 %    Platelet Count 155 150 - 450 10e9/L   Magnesium   Result Value Ref Range    Magnesium 2.5 (H) 1.6 - 2.3 mg/dL   Lipid panel reflex to direct LDL   Result Value Ref Range    Cholesterol 197 <200 mg/dL    Triglycerides 131 <150 mg/dL    HDL Cholesterol 54 >39 mg/dL    LDL Cholesterol Calculated 117 (H) <100 mg/dL    Non HDL Cholesterol 143 (H) <130 mg/dL   Hemoglobin A1c   Result Value Ref Range    Hemoglobin A1C 4.8 4.3 - 6.0 %   Troponin I   Result Value Ref Range    Troponin I ES 0.070 (H) 0.000 - 0.045 ug/L   TSH with free T4 reflex   Result Value Ref Range    TSH 0.81 0.40 - 4.00 mU/L   Cell count with differential CSF: Tube 4   Result Value Ref Range    WBC CSF 0 0 - 5 /uL    RBC CSF 21 (H) 0 - 2 /uL    Tube Number 4 #    Color CSF Colorless CLRL    Appearance CSF Clear CLER   Glucose CSF: Tube 1   Result Value Ref Range    Glucose CSF 69 40 - 70 mg/dL   Protein total CSF: Tube 1   Result Value Ref Range    Protein Total CSF 85 (H) 15 - 60 mg/dL   XR Lumbar Puncture Spinal Tap Diag    Narrative    EXAM: XR LUMBAR PUNCTURE SPINAL TAP DIAGNOSTIC  1/18/2017 9:43 AM       History:  Extremity weakness, rule out Guillain [place].     PROCEDURE: The patient consented for a lumbar puncture. The benefits  and risks of the procedure were explained to the patient, including  but not limited to worsening headache, hemorrhage, infection, lower  extremity pain, or nerve root injury. The patient was sterilely  prepped and draped with the patient in the supine position, over the  lower back. Under fluoroscopic guidance, the interlaminar spaces were  noted. 1% lidocaine was administered for local anesthetic over the  L2-3 interlaminar space, and a 22 gauge needle was advanced into the  thecal sac under fluoroscopic guidance. There was initial aspiration  of clear CSF. About 8 cc's total were aspirated.  The needle was  removed. There were no  immediate complications associated with the  procedure.       Fluoro time: 0.2 minutes.   Number of Images: 2      Impression    IMPRESSION: Successful lumbar puncture.    JUAN NGUYEN PA-C         Images and Procedures:  I personally reviewed the images of the following studies:  I briefly reviewed the EMG- showed increased distal latency, conduction block, decreased ampiltudes, increased F waves

## 2017-01-18 NOTE — CONSULTS
RADIATION ONCOLOGY CONSULTATION    Full dictation to follow. Briefly, patient with metastatic cholangiocarcinoma, who presented with profound weakness in all 4 limbs. Spine MRI shows multiple sites concerning for metastases, most prominently in lower C-spine and upper T-spine, but no spinal cord compression, and weakness has not improved with IV steroids. He is also being worked up for other neurologic etiologies (e.g., Guillain-[place] syndrome). Will continue to follow and discuss with his other providers the appropriate course of action.    Torres Vincent MD  Radiation Oncology

## 2017-01-18 NOTE — PLAN OF CARE
Problem: Goal Outcome Summary  Goal: Goal Outcome Summary  SLP: Clinical swallow evaluation completed per MD request. Pt presents with normal oral and pharyngeal swallowing function. Mastication WFL. Swallow response is prompt with adequate hyolaryngeal elevation upon manual palpation. No overt s/sx of aspiration with sips of thin liquid by cup/straw or dry solid texture. Isolated incidence of delayed gurgly vocal quality after trial of puree which cleared with volitional throat clear. Pt is at increased risk of aspiration given need for full assistance for feeding 2/2 UE weakness. Recommend continue regular diet and thin liquids. Swallowing/feeding precautions include pt awake/alert, sit up at 90 degrees/in chair, small bites/sips, slow pace, swallow all of oral contents prior to next bite/sip, alternate solids/liquids as needed, regular oral cares. No further SLP needs are identified at this time. SLP will sign off. Thank you for involvement in this pt's care. Please re-consult if needs arise in the future. Defer discharge recommendations to OT/PT.

## 2017-01-18 NOTE — PROGRESS NOTES
RADIOLOGY PROCEDURE NOTE  Patient name: Rick Teixeira  MRN: [id number]  : 1945    Pre-procedure diagnosis: Weakness  Post-procedure diagnosis: Same    Procedure Date/Time: 2017  9:36 AM  Procedure: Lumbar Puncture  Estimated blood loss: None  Specimen(s) collected with description: 8 cc clear CSF  The patient tolerated the procedure well with no immediate complications.  Significant findings:none    See imaging dictation for procedural details.    Provider name: Alessandro Nice  Assistant(s):None

## 2017-01-18 NOTE — PROGRESS NOTES
Woke patient for 4 am assessment and change linens as he became incontinent of urine. Noted to be very confused and illogical. Extremities continue to be very weak, all other neuro's intact. VSS. RRT called, please see RRT note.

## 2017-01-18 NOTE — CONSULTS
[facility] Consultation      Rick Teixeira MRN# [id number]   YOB: 1945 Age: 71 year old   Date of Admission: 1/17/2017  Requesting physician: Henry Pantoja MD  Reason for consult: Metastatic cholangiocarcinoma.           Assessment and Plan:   71 year old male with metastatic intrahepatic cholangiocarcinoma presenting with sudden onset bilateral upper and lower extremity weakness.    1. Metastatic intrahepatic cholangiocarcinoma with pulmonary, hepatic, and skeletal metastases  - Patient had recent scans performed at [facility] that demonstrated disease progression after 7 cycles of 1st line therapy with gemcitabine and cisplatin.  Plan was to start 2nd line therapy with FOLFOX next week -- all chemo will be placed on hold until clinical improvement in his GBS/weakness.  Can consider a port placement prior to eventual hospital discharge.  Discussed with the patient.    2. Generalized weakness, sudden onset  -  Appreciate Neurology evaluation and recommendations. Discussed with Dr. Barriga and Dr. Lopez. 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  Appreciate Nephrology assistance to start plasmapheresis early tomorrow.    3. Anemia related to recent chemotherapy and cancer  - Hgb remains stable.  Continue serial CBC monitoring.    Full code    Thank you very much for the consult request.  Will follow with you.    Mindy Lombardo MD             Chief Complaint:   Generalized Weakness           History of Present Illness:   This patient is a 71 year old male with metastatic intrahepatic cholangiocarcinoma and the following oncologic history:  1.  10/5/2001: Left radical orchiectomy in [place] at [facility].  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  Tumor markers prior to surgery showed AFP = 4.3, LDH = 161.  Received radiation.  2.  7/7/2016: Presented with a several week history of shortness of breath and cough, no hemoptysis.  Chest x-ray  showed pulmonary nodules.  CT chest/abdomen/pelvis showed bilateral lung nodules, largest measuring 1.3 x 1 cm, bilateral pleural nodularity, largest measuring 1.3 x 0.5 cm, left hilar lymphadenopathy, mediastinal and right hilar lymphadenopathy, and an 8 cm heterogeneous mass in the right lobe of the liver with underlying cirrhosis.  3.  7/11/2016: Percutaneous biopsy of the liver mass showed poorly differentiated adenocarcinoma, CK 7 positive, CA 19-9 positive, negative for AFP with a background of cirrhosis, most consistent with cholangiocarcinoma.  4.  7/20/2016: Oncology consultation with Dr. Amalia Ribera and Dr. Lon Monteiro at the [facility].  7/18/2016 CBC normal, INR 1.2, alkaline phosphatase 153, ALT 72, ALT 39, albumin 3.9, total bilirubin 0.9, direct bilirubin 0.4, creatinine 0.9, AFP = 59, CA 19-9 = 5505.  5.  08/03/2016: Started first-line chemotherapy with gemcitabine and cisplatin given on days 1 and 8 every 21 days.  Cycle 1 day 8 cisplatin held due to increase in creatinine to 1.6.  6. 12/2016: After 7 cycles of gemcitabine and cisplatin, he developed disease progression based on CT scans at the [facility].  Therefore this treatment was discontinued as of 12/29/2016.     Elbert developed sudden onset bilateral upper and lower extremity weakness to the severity that he was unable to walk or transfer to car.  He had taken one Percocet prior to the onset of these symptoms but no other new medications.  He denied any associated fevers, chills, night sweats, pain, bowel or bladder dysfunction.  He then called 911 and was brought to [facility].  Extensive workup was undertaken including MRI cervical, thoracic, and lumbar spine which showed multiple skeletal metastases but no signs of spinal cord compression.  Brain MRI showed small areas of restricted diffusion in the cortex of the right frontal lobe consistent with small recent cortical infarcts and no brain metastases. Hgb was 10, WBC 3.7, and  "platelets 144,000, creatinine 1.17.  TTE showed LVEF 50-55%, normal RVSF; in atrial fibrillation. Lumbar puncture was performed and showed elevated total protein; cytology pending.             Physical Exam:   Vitals were reviewed  Blood pressure 119/87, pulse 84, temperature 98.1  F (36.7  C), temperature source Axillary, resp. rate 17, height 1.778 m (5' 10\"), weight 76.1 kg (167 lb 12.3 oz), SpO2 97 %.  Temperatures:  Current - Temp: 98.1  F (36.7  C); Max - Temp  Av.9  F (36.6  C)  Min: 97.4  F (36.3  C)  Max: 98.2  F (36.8  C)  Respiration range: Resp  Av.1  Min: 9  Max: 25  Pulse range: Pulse  Av  Min: 84  Max: 84  Blood pressure range: Systolic (24hrs), Av mmHg, Min:119 mmHg, Max:152 mmHg  ; Diastolic (24hrs), Av mmHg, Min:84 mmHg, Max:105 mmHg    Pulse oximetry range: SpO2  Av.1 %  Min: 90 %  Max: 98 %    Intake/Output Summary (Last 24 hours) at 17 1453  Last data filed at 17 1400   Gross per 24 hour   Intake    720 ml   Output    650 ml   Net     70 ml       GENERAL: No acute distress.  SKIN: No rashes or jaundice.  HEENT: Normocephalic, atraumatic. Eyes anicteric. Oropharynx is clear.  LYMPH: No palpable lymphadenopathy in the cervical, supraclavicular, axillary regions.  HEART: Irregularly irregular rate and rhythm with no murmurs.  LUNGS: Clear bilaterally anteriorly.  ABDOMEN: Soft, nontender, nondistended with no palpable hepatosplenomegaly.  EXTREMITIES: No clubbing, cyanosis, or edema.  MENTAL: Alert and oriented to person, place, and time.  NEURO: Cranial nerves II through XII grossly intact with inability to raise his legs or arms to gravity; able to wiggle fingers and toes but  strength significantly reduced.            Past Medical History:   I have reviewed this patient's past medical history  Past Medical History   Diagnosis Date     Hypertension      Cancer (H)      liver and lung             Past Surgical History:   I have reviewed this patient's " past surgical history  Past Surgical History   Procedure Laterality Date     Genitourinary surgery       testicular cancer               Social History:   I have reviewed this patient's social history  Social History   Substance Use Topics     Smoking status: Former Smoker     Smokeless tobacco: Not on file     Alcohol Use: No             Family History:   I have reviewed this patient's family history  No family history on file. Stewart syndrome, father with colon cancer, mother with ovarian cancer; 2 sisters with colon cancer.          Allergies:     Allergies   Allergen Reactions     Ancef [Cefazolin] Other (See Comments)     Chest heaviness     Penicillins Unknown             Medications:   I have reviewed this patient's current medications  Prescriptions prior to admission   Medication Sig Dispense Refill Last Dose     AMLODIPINE BESYLATE PO Take 5 mg by mouth daily    1/17/2017 at am     LISINOPRIL PO Take 10 mg by mouth daily    1/17/2017 at am     ATENOLOL PO Take 25 mg by mouth daily   1/17/2017 at am     DOCUSATE SODIUM PO Take by mouth daily   1/17/2017 at Unknown time     sennosides (SENOKOT) 8.6 MG tablet Take 1 tablet by mouth 2 times daily   1/17/2017 at am     VITAMIN D, CHOLECALCIFEROL, PO Take 400 Units by mouth daily   1/17/2017 at am     multivitamin, therapeutic (THERA-VIT) TABS tablet Take 1 tablet by mouth daily   1/17/2017 at am     PROCHLORPERAZINE MALEATE PO Take 10 mg by mouth daily    1/17/2017 at am     Current Facility-Administered Medications Ordered in Epic   Medication Dose Route Frequency Last Rate Last Dose     Medication Instruction   Does not apply Continuous PRN         labetalol (NORMODYNE/TRANDATE) injection 10-40 mg  10-40 mg Intravenous Q10 Min PRN         naloxone (NARCAN) injection 0.1-0.4 mg  0.1-0.4 mg Intravenous Q2 Min PRN         lidocaine 1 % 1 mL  1 mL Other Q1H PRN         lidocaine (LMX4) cream   Topical Q1H PRN         sodium chloride (PF) 0.9% PF flush 3 mL  3 mL  Intracatheter Q1H PRN         sodium chloride (PF) 0.9% PF flush 3 mL  3 mL Intracatheter Q8H   3 mL at 01/18/17 1428     potassium chloride SA (K-DUR/KLOR-CON M) CR tablet 20-40 mEq  20-40 mEq Oral Q2H PRN   40 mEq at 01/17/17 2326     potassium chloride (KLOR-CON) Packet 20-40 mEq  20-40 mEq Oral or Feeding Tube Q2H PRN         potassium chloride 10 mEq in 100 mL intermittent infusion  10 mEq Intravenous Q1H PRN         potassium chloride 10 mEq in 100 mL intermittent infusion with 10 mg lidocaine  10 mEq Intravenous Q1H PRN         potassium chloride 20 mEq in 50 mL intermittent infusion  20 mEq Intravenous Q1H PRN         magnesium sulfate 4 g in 100 mL sterile water (premade)  4 g Intravenous Q4H PRN   4 g at 01/17/17 2347     acetaminophen (TYLENOL) tablet 650 mg  650 mg Oral Q4H PRN   650 mg at 01/17/17 2326     acetaminophen (TYLENOL) Suppository 650 mg  650 mg Rectal Q4H PRN         HYDROcodone-acetaminophen (NORCO) 5-325 MG per tablet 1-2 tablet  1-2 tablet Oral Q4H PRN         HYDROmorphone (PF) (DILAUDID) injection 0.3-0.5 mg  0.3-0.5 mg Intravenous Q2H PRN         senna-docusate (SENOKOT-S;PERICOLACE) 8.6-50 MG per tablet 1-2 tablet  1-2 tablet Oral BID PRN   1 tablet at 01/18/17 1109     polyethylene glycol (MIRALAX/GLYCOLAX) Packet 17 g  17 g Oral Daily PRN         bisacodyl (DULCOLAX) Suppository 10 mg  10 mg Rectal Daily PRN         ondansetron (ZOFRAN-ODT) ODT tab 4 mg  4 mg Oral Q6H PRN        Or     ondansetron (ZOFRAN) injection 4 mg  4 mg Intravenous Q6H PRN         sodium chloride (PF) 0.9% PF flush 3 mL  3 mL Intracatheter Q1H PRN   3 mL at 01/18/17 1034     docusate sodium (COLACE) capsule 100 mg  100 mg Oral Daily   100 mg at 01/18/17 1109     multivitamin, therapeutic (THERA-VIT) tablet 1 tablet  1 tablet Oral Daily   1 tablet at 01/18/17 1109     sennosides (SENOKOT) tablet 1 tablet  1 tablet Oral BID   1 tablet at 01/17/17 2301     cholecalciferol (vitamin D) tablet 400 Units  400 Units  Oral Daily   400 Units at 01/18/17 1108     labetalol (NORMODYNE/TRANDATE) injection 10 mg  10 mg Intravenous Q2H PRN         hydrALAZINE (APRESOLINE) injection 10 mg  10 mg Intravenous Q4H PRN         No current Epic-ordered outpatient prescriptions on file.             Review of Systems:   The 10 point Review of Systems is negative other than noted in the HPI.            Data:   All laboratory data reviewed  Results for orders placed or performed during the hospital encounter of 01/17/17 (from the past 24 hour(s))   MR Cervical Spine w/o Contrast    Narrative    MR CERVICAL SPINE W/O CONTRAST   1/17/2017 3:03 PM     HISTORY: bilateral UE/LE weakness    TECHNIQUE:  Multiplanar multisequence MRI of the cervical spine  without gadolinium contrast.     COMPARISON:  None.    FINDINGS: Motion artifact degrades quality of these images.  Infiltrative lesions are seen within the C7 and T2 and T3 vertebral  bodies. This is consistent with metastatic disease to these vertebrae.  There is abnormal soft tissue extending from the T2 vertebral body  into the right ventral epidural space and right T2-T3 neural foramen.  This could affect the right T2 nerve root. There is moderate central  spinal stenosis at C6-7 due to bulging disc and associated  osteophytes. There is mild flattening of the cord at this level due to  the degenerative change.    C2-C3:  Normal disc, facet joints, spinal canal and neural foramina.    C3-C4:  Mild degenerative change in the facet joints.    C4-C5:  Mild annular bulge. Central canal still adequate. Moderate  foraminal stenosis due to uncinate spurs and loss of disc space  height.    C5-C6:  Loss of disc space height. Diffuse annular disc bulge. Central  canal mildly narrowed due to the bulging disc. Moderate bilateral  foraminal stenosis due to uncinate spurs.    C6-C7:  Degeneration of the disc. Diffuse annular disc bulge with  associated posterior osteophytes leading to moderate central  stenosis  and mild flattening of the cord. There is also moderate bilateral  foraminal stenosis at this level due to loss of disc space height and  uncinate spurs.    C7-T1: Degeneration of the disc. Mild annular disc bulge. Moderate  bilateral foraminal stenosis due to uncinate spurs.      Impression    IMPRESSION:    1. Study is consistent with bone metastasis to C7, T2, and T3. There  is a small amount of epidural tumor at the right T2 level which also  extends into the right T2-3 neural foramen. This could affect the  right T2 nerve root. No central stenosis is seen at this level.  2. Multilevel degenerative change. The degenerative changes are  leading to moderate central stenosis at C6-7 with mild flattening of  the cord at this level.  3. There is also moderate foraminal stenosis at multiple levels due to  loss of disc space height and uncinate spurs.    KEILY GARCIA MD   MR Thoracic Spine w/o Contrast    Narrative    MR THORACIC SPINE W/O CONTRAST 1/17/2017 3:04 PM     HISTORY: limited movement of UE/LE    TECHNIQUE: Multiplanar multisequence MRI of the thoracic spine without  gadolinium contrast.    COMPARISON: None.    FINDINGS: Inversion recovery images reveal infiltrative lesions within  the the C7, T2 and T12 vertebral bodies. There is also an infiltrative  process in the T9 lamina and spinous process. These are consistent  with metastatic disease. A very small amount of epidural tumor is seen  at the right T3 2 level causing only slight mass effect on the dural  sac. This is better seen on the MR scan of the cervical spine. There  are multilevel degenerative changes with loss of disc space height  throughout the thoracic spine. Mild bulging discs are seen at T4-5,  C5-6, and there is a small central disc protrusion at T6-7.      Impression    IMPRESSION:    1. Infiltrative lesions within the C7 and T2, and T12 vertebral bodies  and within the T9 lamina and spinous process. This is consistent  with  metastatic disease.  2. Small amount of epidural tumor at the T2 level but no cord  compression or central stenosis.  3. Multilevel degenerative change.    KEILY GARCIA MD   MR Brain w/o Contrast    Narrative    MR BRAIN W/O CONTRAST 1/17/2017 3:04 PM     HISTORY: bilateral UE/LE weakness    TECHNIQUE: Multiplanar, multisequence MRI of the brain without IV  contrast material. No contrast was administered because of the length  of the examination. The patient was becoming uncomfortable.    COMPARISON: None.    FINDINGS: Motion artifact degrades quality of these images.  There is  generalized atrophy of the brain. . There are small areas of  restricted diffusion in the cortex of the right frontal lobe. These  measure about 4 mm in size. These would be compatible with recent  cortical infarcts. No lesions are seen in the cerebellum or left  hemisphere.. . The facial structures appear normal. The arteries at  the base of the brain and the dural venous sinuses appear patent. No  brain metastasis are identified. No mass lesions are seen.      Impression    IMPRESSION:   1. Small areas of restricted diffusion in the cortex of the right  frontal lobe. These are consistent with small recent cortical  infarcts.  2. No brain metastasis are identified. No mass lesions are seen.  3. Generalized atrophy of the brain. .  4. No skull metastasis are identified.     KEILY GARCIA MD   MR Lumbar Spine w/o Contrast    Narrative    MR LUMBAR SPINE WITHOUT CONTRAST1/17/2017 2:59 PM      HISTORY: History of cancer, increasing weakness.    TECHNIQUE:  Multiplanar, multisequence MRI of the lumbar spine without  contrast.     COMPARISON: None.    FINDINGS:This report assumes five lumbar type vertebrae.     The conus and visualized portions of the thoracic spinal cord appear  normal. The paraspinal soft tissues appear normal as visualized. The  bone marrow has normal signal intensity. No bone metastasis are seen  in the lumbar region. No  central spinal stenosis or cord compression.    L1-L2:   Normal disc, facet joints, spinal canal and neural foramina.    L2-L3:  Normal disc, facet joints, spinal canal and neural foramina.    L3-L4:  Degeneration of the disc. Diffuse annular disc bulge. There is  a small more right central disc herniation causing mild mass effect on  the dural sac. Central canal is mildly narrowed due to this right  central disc herniation. This herniation extends slightly inferior to  the level of the disc and posterior to the L4 vertebral body.    L4-L5:  Degeneration of the disks. Loss of disc space height. Diffuse  annular disc bulge. There is disc material extending into the right  and left neural foramen causing moderate bilateral foraminal stenosis.  Mild central stenosis due to the bulging disc and degenerative change  off the facet joints.    L5-S1:  Normal disc, facet joints, spinal canal and neural foramina.      Impression    IMPRESSION:   1. No bone metastasis are seen in the lumbar region.  2. Multilevel degenerative change.  3. Moderate size right central disc herniation at L3-L4 causing mild  mass effect on the dural sac. This could affect the right L5 nerve  root as it arises from the sac. It is also causing mild central  stenosis.  4. Moderate bilateral L4-5 foraminal stenosis due to a bulging disc  and degenerative change off the facet joints. There is also moderate  central stenosis and lateral recess stenosis at this level due to  degenerative change off the facet joints and a bulging disc.    KEILY GARCIA MD   EKG 12 lead   Result Value Ref Range    Interpretation ECG Click View Image link to view waveform and result    Neurology IP Consult: Patient to be seen: Routine - within 24 hours; extremity weakness; Consultant may enter orders: Yes    Narrative    Patsy Gauthier MD     1/18/2017  7:34 AM  [facility]    Neurology Consultation     Date of Admission:  1/17/2017  Date of Consult  (When I saw the patient): 01/17/2017    Assessment and Plan  Rick Teixeira is a 71 year old male who was admitted on   1/17/2017. I was asked to see the patient for generalized   weakness.      #. 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  #. 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    Patsy Gauthier MD    Code Status   No Order        Reason for Consult  Reason for consult: I was asked by Dr. Pantoja to evaluate this   patient for severe weakness.      Chief Complaint  Weak all over    History is obtained from the patient and electronic chart    History of Present Illness  Rick Teixeira is a 71 year old male with metastatic   cholangiocarinoma who presents with rapid/subacute onset of   weakness BUE and BLE over 2 days.  He has been unable to walk for   the past 2 days.  Notes weakness in the arms and legs.  Continued   chronic mid back pain, no other significant new pain, no muscle   pain.  He notes some numbness as well.  No bowel/bladder problems   but was unable to urinate in the ER today.  He denies any facial   weakness, no slurred speech, no problems breathing.  No recent   illness- no URI or diarrhea.  No recent  fevers or feeling ill.  Last chemo was 2 weeks ago.  He has plans for starting new chemo   next week.    Past Medical History  I have reviewed this patient's medical history and updated it   with pertinent information if needed.   Past Medical History   Diagnosis Date     Hypertension      Cancer (H)      liver and lung       Past Surgical History  I have reviewed this patient's surgical history and updated it   with pertinent information if needed.  Past Surgical History   Procedure Laterality Date     Genitourinary surgery       testicular cancer       Prior to Admission Medications  Prior to Admission Medications   Prescriptions Last Dose Informant Patient Reported? Taking?   AMLODIPINE BESYLATE PO   Yes Yes   LISINOPRIL PO   Yes Yes      Facility-Administered Medications: None     Allergies  Allergies   Allergen Reactions     Ancef [Cefazolin] Other (See Comments)     Chest heaviness     Penicillins Unknown       Social History  I have reviewed this patient's social history and updated it with   pertinent information if needed. Rick Teixeira  reports that   he has quit smoking. He does not have any smokeless tobacco   history on file. He reports that he does not drink alcohol or use   illicit drugs.    Family History  I have reviewed this patient's family history and updated it with   pertinent information if needed.   No significant neuro history.     Review of Systems  The 10 point Review of Systems is negative other than noted in   the HPI.    Physical Exam  Temp: 98.2  F (36.8  C) Temp src: Oral BP: (!) 133/101 mmHg   Pulse: 84   Resp: 14 SpO2: 94 % O2 Device: None (Room air)    Vital Signs with Ranges  Temp:  [98.2  F (36.8  C)] 98.2  F (36.8  C)  Pulse:  [] 84  Resp:  [14-18] 14  BP: (124-137)/() 133/101 mmHg  SpO2:  [94 %-100 %] 94 %  165 lbs 0 oz    General Appearance:  No acute distress  Neuro:       Mental Status Exam:  A,A, fully oriented- able to discuss in   detail his chemo  treatments, symptoms.       Cranial Nerves: CN 2-12 examined and intact       Motor:  2/5 B prox arms, biceps, 3/5B wrist flexors and hand   , 2/5 B hip flexors, knee flexors, 3/5 B dorsi and plantar   flexion       Reflexes:  0 throughout, downgoing toes, no clonus       Sensory:  Decreased pinprick B arms, R foot up to above   knee, L foot up to below knee       Coordination:  Unable to assess- too weak       Gait:  Unable to assess- too weak  Neck: no nuchal rigidity. No carotid bruits.    Extremities: No clubbing, no cyanosis, no edema  CV: RRR nl s1, s2  Resp: CTAB        Data  Results for orders placed or performed during the hospital   encounter of 01/17/17 (from the past 24 hour(s))   Basic metabolic panel   Result Value Ref Range    Sodium 137 133 - 144 mmol/L    Potassium 3.3 (L) 3.4 - 5.3 mmol/L    Chloride 102 94 - 109 mmol/L    Carbon Dioxide 30 20 - 32 mmol/L    Anion Gap 5 3 - 14 mmol/L    Glucose 99 70 - 99 mg/dL    Urea Nitrogen 16 7 - 30 mg/dL    Creatinine 1.17 0.66 - 1.25 mg/dL    GFR Estimate 62 >60 mL/min/1.7m2    GFR Estimate If Black 75 >60 mL/min/1.7m2    Calcium 9.5 8.5 - 10.1 mg/dL   CBC (+ platelets, no diff)   Result Value Ref Range    WBC 3.7 (L) 4.0 - 11.0 10e9/L    RBC Count 2.87 (L) 4.4 - 5.9 10e12/L    Hemoglobin 10.0 (L) 13.3 - 17.7 g/dL    Hematocrit 30.0 (L) 40.0 - 53.0 %     (H) 78 - 100 fl    MCH 34.8 (H) 26.5 - 33.0 pg    MCHC 33.3 31.5 - 36.5 g/dL    RDW 16.8 (H) 10.0 - 15.0 %    Platelet Count 144 (L) 150 - 450 10e9/L   Magnesium   Result Value Ref Range    Magnesium 1.1 (L) 1.6 - 2.3 mg/dL   INR   Result Value Ref Range    INR 1.11 0.86 - 1.14   Hepatic panel   Result Value Ref Range    Bilirubin Direct 0.3 (H) 0.0 - 0.2 mg/dL    Bilirubin Total 0.8 0.2 - 1.3 mg/dL    Albumin 3.2 (L) 3.4 - 5.0 g/dL    Protein Total 7.2 6.8 - 8.8 g/dL    Alkaline Phosphatase 242 (H) 40 - 150 U/L    ALT 28 0 - 70 U/L    AST 51 (H) 0 - 45 U/L   MR Cervical Spine w/o Contrast     Narrative    MR CERVICAL SPINE W/O CONTRAST   1/17/2017 3:03 PM     HISTORY: bilateral UE/LE weakness    TECHNIQUE:  Multiplanar multisequence MRI of the cervical spine  without gadolinium contrast.     COMPARISON:  None.    FINDINGS: Motion artifact degrades quality of these images.  Infiltrative lesions are seen within the C7 and T2 and T3   vertebral  bodies. This is consistent with metastatic disease to these   vertebrae.  There is abnormal soft tissue extending from the T2 vertebral   body  into the right ventral epidural space and right T2-T3 neural   foramen.  This could affect the right T2 nerve root. There is moderate   central  spinal stenosis at C6-7 due to bulging disc and associated  osteophytes. There is mild flattening of the cord at this level   due to  the degenerative change.    C2-C3:  Normal disc, facet joints, spinal canal and neural   foramina.    C3-C4:  Mild degenerative change in the facet joints.    C4-C5:  Mild annular bulge. Central canal still adequate.   Moderate  foraminal stenosis due to uncinate spurs and loss of disc space  height.    C5-C6:  Loss of disc space height. Diffuse annular disc bulge.   Central  canal mildly narrowed due to the bulging disc. Moderate bilateral  foraminal stenosis due to uncinate spurs.    C6-C7:  Degeneration of the disc. Diffuse annular disc bulge with  associated posterior osteophytes leading to moderate central   stenosis  and mild flattening of the cord. There is also moderate bilateral  foraminal stenosis at this level due to loss of disc space height   and  uncinate spurs.    C7-T1: Degeneration of the disc. Mild annular disc bulge.   Moderate  bilateral foraminal stenosis due to uncinate spurs.      Impression    IMPRESSION:    1. Study is consistent with bone metastasis to C7, T2, and T3.   There  is a small amount of epidural tumor at the right T2 level which   also  extends into the right T2-3 neural foramen. This could affect the  right T2  nerve root. No central stenosis is seen at this level.  2. Multilevel degenerative change. The degenerative changes are  leading to moderate central stenosis at C6-7 with mild flattening   of  the cord at this level.  3. There is also moderate foraminal stenosis at multiple levels   due to  loss of disc space height and uncinate spurs.    KEILY GARCIA MD   MR Thoracic Spine w/o Contrast    Narrative    MR THORACIC SPINE W/O CONTRAST 1/17/2017 3:04 PM     HISTORY: limited movement of UE/LE    TECHNIQUE: Multiplanar multisequence MRI of the thoracic spine   without  gadolinium contrast.    COMPARISON: None.    FINDINGS: Inversion recovery images reveal infiltrative lesions   within  the the C7, T2 and T12 vertebral bodies. There is also an   infiltrative  process in the T9 lamina and spinous process. These are   consistent  with metastatic disease. A very small amount of epidural tumor is   seen  at the right T3 2 level causing only slight mass effect on the   dural  sac. This is better seen on the MR scan of the cervical spine.   There  are multilevel degenerative changes with loss of disc space   height  throughout the thoracic spine. Mild bulging discs are seen at   T4-5,  C5-6, and there is a small central disc protrusion at T6-7.      Impression    IMPRESSION:    1. Infiltrative lesions within the C7 and T2, and T12 vertebral   bodies  and within the T9 lamina and spinous process. This is consistent   with  metastatic disease.  2. Small amount of epidural tumor at the T2 level but no cord  compression or central stenosis.  3. Multilevel degenerative change.    KEILY GARCIA MD   MR Brain w/o Contrast    Narrative    MR BRAIN W/O CONTRAST 1/17/2017 3:04 PM     HISTORY: bilateral UE/LE weakness    TECHNIQUE: Multiplanar, multisequence MRI of the brain without IV  contrast material. No contrast was administered because of the   length  of the examination. The patient was becoming uncomfortable.    COMPARISON:  None.    FINDINGS: Motion artifact degrades quality of these images.    There is  generalized atrophy of the brain. . There are small areas of  restricted diffusion in the cortex of the right frontal lobe.   These  measure about 4 mm in size. These would be compatible with recent  cortical infarcts. No lesions are seen in the cerebellum or left  hemisphere.. . The facial structures appear normal. The arteries   at  the base of the brain and the dural venous sinuses appear patent.   No  brain metastasis are identified. No mass lesions are seen.      Impression    IMPRESSION:   1. Small areas of restricted diffusion in the cortex of the right  frontal lobe. These are consistent with small recent cortical  infarcts.  2. No brain metastasis are identified. No mass lesions are seen.  3. Generalized atrophy of the brain. .  4. No skull metastasis are identified.     KEILY GARCIA MD   MR Lumbar Spine w/o Contrast    Narrative    MR LUMBAR SPINE WITHOUT CONTRAST1/17/2017 2:59 PM      HISTORY: History of cancer, increasing weakness.    TECHNIQUE:  Multiplanar, multisequence MRI of the lumbar spine   without  contrast.     COMPARISON: None.    FINDINGS:This report assumes five lumbar type vertebrae.     The conus and visualized portions of the thoracic spinal cord   appear  normal. The paraspinal soft tissues appear normal as visualized.   The  bone marrow has normal signal intensity. No bone metastasis are   seen  in the lumbar region. No central spinal stenosis or cord   compression.    L1-L2:   Normal disc, facet joints, spinal canal and neural   foramina.    L2-L3:  Normal disc, facet joints, spinal canal and neural   foramina.    L3-L4:  Degeneration of the disc. Diffuse annular disc bulge.   There is  a small more right central disc herniation causing mild mass   effect on  the dural sac. Central canal is mildly narrowed due to this right  central disc herniation. This herniation extends slightly   inferior to  the level  of the disc and posterior to the L4 vertebral body.    L4-L5:  Degeneration of the disks. Loss of disc space height.   Diffuse  annular disc bulge. There is disc material extending into the   right  and left neural foramen causing moderate bilateral foraminal   stenosis.  Mild central stenosis due to the bulging disc and degenerative   change  off the facet joints.    L5-S1:  Normal disc, facet joints, spinal canal and neural   foramina.      Impression    IMPRESSION:   1. No bone metastasis are seen in the lumbar region.  2. Multilevel degenerative change.  3. Moderate size right central disc herniation at L3-L4 causing   mild  mass effect on the dural sac. This could affect the right L5   nerve  root as it arises from the sac. It is also causing mild central  stenosis.  4. Moderate bilateral L4-5 foraminal stenosis due to a bulging   disc  and degenerative change off the facet joints. There is also   moderate  central stenosis and lateral recess stenosis at this level due to  degenerative change off the facet joints and a bulging disc.    KEILY GARCIA MD   EKG 12 lead   Result Value Ref Range    Interpretation ECG Click View Image link to view waveform and   result            Imaging and procedures:  I personally reviewed these images.  MRI brain: very small areas R cortical infarcts  MRI Cspine: bone mets C7, T2, T3; epidural tumor T2; moderate   central stenosis c6-7  MRI Tspine: as above mets and T2 tumor  MRI L spine: mod R L3-4 disk, no mets         MR Cervical Spine w Contrast    Narrative    MR CERVICAL SPINE WITH CONTRAST 1/17/2017 9:39 PM     HISTORY: Metastatic disease. Evaluation needed with contrast for  treatment purposes.    TECHNIQUE: Postcontrast images were obtained through the cervical  spine with 7 mL of Gadavist.    COMPARISON: MR cervical spine without contrast on same date.    FINDINGS: Postcontrast images show enhancement of the bone lesions in  the C7 and T2 vertebral bodies consistent with  osseous metastases. In  addition, there is a small amount of enhancing epidural tumor at the  T2 level in the right anterolateral epidural space causing some mild  central canal stenosis but no cord deformity. There is also enhancing  soft tissue in the right C6-C7 and C7-T1 neural foramen as well as the  right T2-T3 neural foramen consistent with some tumor. There is also a  small amount of epidural tumor in the right anterior epidural space at  C7. There is no evidence for any intradural tumor any intramedullary  tumor. Degenerative changes are also superimposed most advanced at  C6-C7 where there is moderate central canal and bilateral neural  foraminal stenosis along with mild cord deformity. This is just above  the small amount of epidural tumor at C7.      Impression    IMPRESSION: Osseous metastases at C7 and T1 with some epidural tumor  in the right anterior lateral epidural spaces at these levels as well  as some epidural tumor in the right side of neural foramen at C6-C7,  C7-T1, and T2-T3. The epidural tumor is not causing any cord  impingement at this time. There is moderate facet degenerative central  canal stenosis at C6-C7 with some mild cord deformity.    SO MALONE MD   EKG 12-lead, tracing only   Result Value Ref Range    Interpretation ECG Click View Image link to view waveform and result    Comprehensive metabolic panel   Result Value Ref Range    Sodium 137 133 - 144 mmol/L    Potassium 4.6 3.4 - 5.3 mmol/L    Chloride 103 94 - 109 mmol/L    Carbon Dioxide 25 20 - 32 mmol/L    Anion Gap 9 3 - 14 mmol/L    Glucose 185 (H) 70 - 99 mg/dL    Urea Nitrogen 21 7 - 30 mg/dL    Creatinine 1.16 0.66 - 1.25 mg/dL    GFR Estimate 62 >60 mL/min/1.7m2    GFR Estimate If Black 75 >60 mL/min/1.7m2    Calcium 9.1 8.5 - 10.1 mg/dL    Bilirubin Total 0.8 0.2 - 1.3 mg/dL    Albumin 3.1 (L) 3.4 - 5.0 g/dL    Protein Total 7.3 6.8 - 8.8 g/dL    Alkaline Phosphatase 243 (H) 40 - 150 U/L    ALT 33 0 - 70 U/L    AST  79 (H) 0 - 45 U/L   CBC with platelets   Result Value Ref Range    WBC 4.3 4.0 - 11.0 10e9/L    RBC Count 3.06 (L) 4.4 - 5.9 10e12/L    Hemoglobin 10.7 (L) 13.3 - 17.7 g/dL    Hematocrit 31.6 (L) 40.0 - 53.0 %     (H) 78 - 100 fl    MCH 35.0 (H) 26.5 - 33.0 pg    MCHC 33.9 31.5 - 36.5 g/dL    RDW 16.5 (H) 10.0 - 15.0 %    Platelet Count 155 150 - 450 10e9/L   Magnesium   Result Value Ref Range    Magnesium 2.5 (H) 1.6 - 2.3 mg/dL   Lipid panel reflex to direct LDL   Result Value Ref Range    Cholesterol 197 <200 mg/dL    Triglycerides 131 <150 mg/dL    HDL Cholesterol 54 >39 mg/dL    LDL Cholesterol Calculated 117 (H) <100 mg/dL    Non HDL Cholesterol 143 (H) <130 mg/dL   Hemoglobin A1c   Result Value Ref Range    Hemoglobin A1C 4.8 4.3 - 6.0 %   Troponin I   Result Value Ref Range    Troponin I ES 0.070 (H) 0.000 - 0.045 ug/L   TSH with free T4 reflex   Result Value Ref Range    TSH 0.81 0.40 - 4.00 mU/L   Cell count with differential CSF: Tube 4   Result Value Ref Range    WBC CSF 0 0 - 5 /uL    RBC CSF 21 (H) 0 - 2 /uL    Tube Number 4 #    Color CSF Colorless CLRL    Appearance CSF Clear CLER   Glucose CSF: Tube 1   Result Value Ref Range    Glucose CSF 69 40 - 70 mg/dL   Protein total CSF: Tube 1   Result Value Ref Range    Protein Total CSF 85 (H) 15 - 60 mg/dL   XR Lumbar Puncture Spinal Tap Diag    Narrative    EXAM: XR LUMBAR PUNCTURE SPINAL TAP DIAGNOSTIC  1/18/2017 9:43 AM       History:  Extremity weakness, rule out Guillain [place].     PROCEDURE: The patient consented for a lumbar puncture. The benefits  and risks of the procedure were explained to the patient, including  but not limited to worsening headache, hemorrhage, infection, lower  extremity pain, or nerve root injury. The patient was sterilely  prepped and draped with the patient in the supine position, over the  lower back. Under fluoroscopic guidance, the interlaminar spaces were  noted. 1% lidocaine was administered for local  anesthetic over the  L2-3 interlaminar space, and a 22 gauge needle was advanced into the  thecal sac under fluoroscopic guidance. There was initial aspiration  of clear CSF. About 8 cc's total were aspirated.  The needle was  removed. There were no immediate complications associated with the  procedure.       Fluoro time: 0.2 minutes.   Number of Images: 2      Impression    IMPRESSION: Successful lumbar puncture.    JUAN NGUYEN PA-C

## 2017-01-18 NOTE — CONSULTS
See dictation.    Guillain-[place] syndrome.  Asked to assist re: treatment with plasma exchange.  Pt consents to this treatment and placement of tunneled IJ dialysis catheter for apheresis access.  Will begin 1/19 AM.    Thanks.    Michael Lopez MD  Nephrology; [facility]  [phone]

## 2017-01-18 NOTE — PROVIDER NOTIFICATION
Notified Dr. Ahn regarding RN staying with patient at bedside during LP procedure in Xray. Per [facility] status, pt would require RN at bedside. Flying squad busy with other patient transfer.   Due to pt's stable status at this time, and resolution of symptoms requiring IMC overnight, MD order received:  Ok for patient to be in XRay without accompanying flying squad or bedside RN from .   Pt is being supervised by XRay staff and PA at bedside for procedure.   Writer (RN) will transport pt back up to floor after procedure.

## 2017-01-18 NOTE — PROGRESS NOTES
[facility]    Hospitalist Progress Note    Date of Service (when I saw the patient): 01/18/2017    Assessment and Plan  Rick Teixeira is a 71 year old male who was admitted on 1/17/2017.  Past medical history including metastatic cholangiocarcinoma presenting with acute lower extremity weakness.        Bilateral upper and lower extremity weakness: Etiology unclear.  MRI brain with recent infarcts, however, location would not explain his diffuse weakness.  MRI spine (cervical, thoracic and lumbar) with metastatic lesions at C7, T2 and T12 but negative for cord impingement.  Neurology and neurosurgery consulted and following.    - with concern for Guillan-[place], LP obtained demonstrating elevated protein with normal WBC count, EMG pending  - Neuro recs appreciated  - PT/OT  - continue steroids for now, though without signs of cord compression, unclear if this is of any benefit  - reports more movement in all extremities today    Metastatic cholangiocarcinoma:  Follows with [facility].  MRI demonstrating metastatic disease as noted.  Radiation Oncology consulted, radiation treatment to be determined.  - Oncology consult pending  - Rad Onc recs appreciated    Small recent cortical brain infarcts: MRI of the brain showed small areas of restricted diffusion in the cortex of the right frontal lobe.  No brain metastases identified.  Likely embolic in setting of A-fib.  - TTE pending  - obtain TSH, A1C, fasting lipid panel  - continue neuro checks  - permissive HTN  - PT/OT/SLP evaluations    Paroxysmal A-fib:  Family reports history of A-fib diagnosed at prior hospitalization.  Decision was made against anticoagulation due to limited life expectancy despite hypercoagulable state of malignancy.  - continue tele, currently rate controlled without kelsie blocking agents  - metoprolol IV prn  - defer anticoagulation to Neuro    Hypertension: Holding PTA lisinopril and amlodipine for permissive HTN.      Hypokalemia: Patient with low potassium and magnesium on admission.  - electrolyte protocol    Anemia, macrocytic, thrombocytopenia, pancytopenia: MCV is 105.  White blood cell count and platelet count are also reduced.  Hemoglobin is 10.0 on admission.  - presume secondary to chemotherapy  - WBC and platelet normal today, continue to monitor CBC    DVT Prophylaxis: Pneumatic Compression Devices  Code Status: Full Code    Disposition: Expected discharge in 3 days once work-up complete.    Enrique Ahn    Interval History  Patient feels strength has improved since yesterday.  Reports sensation of extremity numbness but sensation to light touch is intact.  Denies any facial weakness and denies any dyspnea or dysphagia.  No chest pain/pressure, palpitations.  No other complaints.    -Data reviewed today: I reviewed all new labs and imaging results over the last 24 hours. I personally reviewed no images or EKG's today.    Physical Exam  Temp: 98.1  F (36.7  C) Temp src: Axillary BP: 119/87 mmHg Pulse: 84 Heart Rate: 81 Resp: 17 SpO2: 97 % O2 Device: None (Room air)    Filed Vitals:    01/17/17 1215 01/18/17 0430   Weight: 74.844 kg (165 lb) 76.1 kg (167 lb 12.3 oz)     Vital Signs with Ranges  Temp:  [97.4  F (36.3  C)-98.2  F (36.8  C)] 98.1  F (36.7  C)  Pulse:  [84] 84  Heart Rate:  [] 81  Resp:  [9-25] 17  BP: (119-152)/() 119/87 mmHg  SpO2:  [90 %-98 %] 97 %  I/O last 3 completed shifts:  In: 300 [P.O.:300]  Out: 450 [Urine:450]    Constitutional: Well developed, well nourished male in no acute distress  Respiratory: Clear to auscultation bilaterally, no crackles or wheezes  Cardiovascular: Regular rate and rhythm, S1/S2 without murmur, rubs or gallops  GI: Abdomen soft, non-tender, non-distended, normal bowel sounds  Skin/Integumen: No rash or bruising  Other:  alert and appropriate, cranial nerves grossly intact, marked weakness in all extremities      Medications    - MEDICATION INSTRUCTIONS -          sodium chloride (PF)  3 mL Intracatheter Q8H     docusate sodium  100 mg Oral Daily     multivitamin, therapeutic  1 tablet Oral Daily     sennosides  1 tablet Oral BID     cholecalciferol  400 Units Oral Daily       Data    Recent Labs  Lab 01/18/17  0510 01/17/17  1228   WBC 4.3 3.7*   HGB 10.7* 10.0*   * 105*    144*   INR  --  1.11    137   POTASSIUM 4.6 3.3*   CHLORIDE 103 102   CO2 25 30   BUN 21 16   CR 1.16 1.17   ANIONGAP 9 5   SERINA 9.1 9.5   * 99   ALBUMIN 3.1* 3.2*   PROTTOTAL 7.3 7.2   BILITOTAL 0.8 0.8   ALKPHOS 243* 242*   ALT 33 28   AST 79* 51*   TROPI 0.070*  --        Recent Results (from the past 24 hour(s))   MR Cervical Spine w/o Contrast    Narrative    MR CERVICAL SPINE W/O CONTRAST   1/17/2017 3:03 PM     HISTORY: bilateral UE/LE weakness    TECHNIQUE:  Multiplanar multisequence MRI of the cervical spine  without gadolinium contrast.     COMPARISON:  None.    FINDINGS: Motion artifact degrades quality of these images.  Infiltrative lesions are seen within the C7 and T2 and T3 vertebral  bodies. This is consistent with metastatic disease to these vertebrae.  There is abnormal soft tissue extending from the T2 vertebral body  into the right ventral epidural space and right T2-T3 neural foramen.  This could affect the right T2 nerve root. There is moderate central  spinal stenosis at C6-7 due to bulging disc and associated  osteophytes. There is mild flattening of the cord at this level due to  the degenerative change.    C2-C3:  Normal disc, facet joints, spinal canal and neural foramina.    C3-C4:  Mild degenerative change in the facet joints.    C4-C5:  Mild annular bulge. Central canal still adequate. Moderate  foraminal stenosis due to uncinate spurs and loss of disc space  height.    C5-C6:  Loss of disc space height. Diffuse annular disc bulge. Central  canal mildly narrowed due to the bulging disc. Moderate bilateral  foraminal stenosis due to uncinate  spurs.    C6-C7:  Degeneration of the disc. Diffuse annular disc bulge with  associated posterior osteophytes leading to moderate central stenosis  and mild flattening of the cord. There is also moderate bilateral  foraminal stenosis at this level due to loss of disc space height and  uncinate spurs.    C7-T1: Degeneration of the disc. Mild annular disc bulge. Moderate  bilateral foraminal stenosis due to uncinate spurs.      Impression    IMPRESSION:    1. Study is consistent with bone metastasis to C7, T2, and T3. There  is a small amount of epidural tumor at the right T2 level which also  extends into the right T2-3 neural foramen. This could affect the  right T2 nerve root. No central stenosis is seen at this level.  2. Multilevel degenerative change. The degenerative changes are  leading to moderate central stenosis at C6-7 with mild flattening of  the cord at this level.  3. There is also moderate foraminal stenosis at multiple levels due to  loss of disc space height and uncinate spurs.    KEILY GARCIA MD   MR Thoracic Spine w/o Contrast    Narrative    MR THORACIC SPINE W/O CONTRAST 1/17/2017 3:04 PM     HISTORY: limited movement of UE/LE    TECHNIQUE: Multiplanar multisequence MRI of the thoracic spine without  gadolinium contrast.    COMPARISON: None.    FINDINGS: Inversion recovery images reveal infiltrative lesions within  the the C7, T2 and T12 vertebral bodies. There is also an infiltrative  process in the T9 lamina and spinous process. These are consistent  with metastatic disease. A very small amount of epidural tumor is seen  at the right T3 2 level causing only slight mass effect on the dural  sac. This is better seen on the MR scan of the cervical spine. There  are multilevel degenerative changes with loss of disc space height  throughout the thoracic spine. Mild bulging discs are seen at T4-5,  C5-6, and there is a small central disc protrusion at T6-7.      Impression    IMPRESSION:    1.  Infiltrative lesions within the C7 and T2, and T12 vertebral bodies  and within the T9 lamina and spinous process. This is consistent with  metastatic disease.  2. Small amount of epidural tumor at the T2 level but no cord  compression or central stenosis.  3. Multilevel degenerative change.    KEILY GARCIA MD   MR Brain w/o Contrast    Narrative    MR BRAIN W/O CONTRAST 1/17/2017 3:04 PM     HISTORY: bilateral UE/LE weakness    TECHNIQUE: Multiplanar, multisequence MRI of the brain without IV  contrast material. No contrast was administered because of the length  of the examination. The patient was becoming uncomfortable.    COMPARISON: None.    FINDINGS: Motion artifact degrades quality of these images.  There is  generalized atrophy of the brain. . There are small areas of  restricted diffusion in the cortex of the right frontal lobe. These  measure about 4 mm in size. These would be compatible with recent  cortical infarcts. No lesions are seen in the cerebellum or left  hemisphere.. . The facial structures appear normal. The arteries at  the base of the brain and the dural venous sinuses appear patent. No  brain metastasis are identified. No mass lesions are seen.      Impression    IMPRESSION:   1. Small areas of restricted diffusion in the cortex of the right  frontal lobe. These are consistent with small recent cortical  infarcts.  2. No brain metastasis are identified. No mass lesions are seen.  3. Generalized atrophy of the brain. .  4. No skull metastasis are identified.     KEILY GARCIA MD   MR Lumbar Spine w/o Contrast    Narrative    MR LUMBAR SPINE WITHOUT CONTRAST1/17/2017 2:59 PM      HISTORY: History of cancer, increasing weakness.    TECHNIQUE:  Multiplanar, multisequence MRI of the lumbar spine without  contrast.     COMPARISON: None.    FINDINGS:This report assumes five lumbar type vertebrae.     The conus and visualized portions of the thoracic spinal cord appear  normal. The paraspinal soft  tissues appear normal as visualized. The  bone marrow has normal signal intensity. No bone metastasis are seen  in the lumbar region. No central spinal stenosis or cord compression.    L1-L2:   Normal disc, facet joints, spinal canal and neural foramina.    L2-L3:  Normal disc, facet joints, spinal canal and neural foramina.    L3-L4:  Degeneration of the disc. Diffuse annular disc bulge. There is  a small more right central disc herniation causing mild mass effect on  the dural sac. Central canal is mildly narrowed due to this right  central disc herniation. This herniation extends slightly inferior to  the level of the disc and posterior to the L4 vertebral body.    L4-L5:  Degeneration of the disks. Loss of disc space height. Diffuse  annular disc bulge. There is disc material extending into the right  and left neural foramen causing moderate bilateral foraminal stenosis.  Mild central stenosis due to the bulging disc and degenerative change  off the facet joints.    L5-S1:  Normal disc, facet joints, spinal canal and neural foramina.      Impression    IMPRESSION:   1. No bone metastasis are seen in the lumbar region.  2. Multilevel degenerative change.  3. Moderate size right central disc herniation at L3-L4 causing mild  mass effect on the dural sac. This could affect the right L5 nerve  root as it arises from the sac. It is also causing mild central  stenosis.  4. Moderate bilateral L4-5 foraminal stenosis due to a bulging disc  and degenerative change off the facet joints. There is also moderate  central stenosis and lateral recess stenosis at this level due to  degenerative change off the facet joints and a bulging disc.    KEILY GARCIA MD   MR Cervical Spine w Contrast    Narrative    MR CERVICAL SPINE WITH CONTRAST 1/17/2017 9:39 PM     HISTORY: Metastatic disease. Evaluation needed with contrast for  treatment purposes.    TECHNIQUE: Postcontrast images were obtained through the cervical  spine with 7 mL  of Gadavist.    COMPARISON: MR cervical spine without contrast on same date.    FINDINGS: Postcontrast images show enhancement of the bone lesions in  the C7 and T2 vertebral bodies consistent with osseous metastases. In  addition, there is a small amount of enhancing epidural tumor at the  T2 level in the right anterolateral epidural space causing some mild  central canal stenosis but no cord deformity. There is also enhancing  soft tissue in the right C6-C7 and C7-T1 neural foramen as well as the  right T2-T3 neural foramen consistent with some tumor. There is also a  small amount of epidural tumor in the right anterior epidural space at  C7. There is no evidence for any intradural tumor any intramedullary  tumor. Degenerative changes are also superimposed most advanced at  C6-C7 where there is moderate central canal and bilateral neural  foraminal stenosis along with mild cord deformity. This is just above  the small amount of epidural tumor at C7.      Impression    IMPRESSION: Osseous metastases at C7 and T1 with some epidural tumor  in the right anterior lateral epidural spaces at these levels as well  as some epidural tumor in the right side of neural foramen at C6-C7,  C7-T1, and T2-T3. The epidural tumor is not causing any cord  impingement at this time. There is moderate facet degenerative central  canal stenosis at C6-C7 with some mild cord deformity.    SO MAOLNE MD   XR Lumbar Puncture Spinal Tap Diag    Narrative    EXAM: XR LUMBAR PUNCTURE SPINAL TAP DIAGNOSTIC  1/18/2017 9:43 AM       History:  Extremity weakness, rule out Guillain [place].     PROCEDURE: The patient consented for a lumbar puncture. The benefits  and risks of the procedure were explained to the patient, including  but not limited to worsening headache, hemorrhage, infection, lower  extremity pain, or nerve root injury. The patient was sterilely  prepped and draped with the patient in the supine position, over the  lower back. Under  fluoroscopic guidance, the interlaminar spaces were  noted. 1% lidocaine was administered for local anesthetic over the  L2-3 interlaminar space, and a 22 gauge needle was advanced into the  thecal sac under fluoroscopic guidance. There was initial aspiration  of clear CSF. About 8 cc's total were aspirated.  The needle was  removed. There were no immediate complications associated with the  procedure.       Fluoro time: 0.2 minutes.   Number of Images: 2      Impression    IMPRESSION: Successful lumbar puncture.    JUAN NGUYEN PA-C

## 2017-01-19 NOTE — PLAN OF CARE
Problem: Goal Outcome Summary  Goal: Goal Outcome Summary  Outcome: No Change  Regular diet with thin liquids continued after bedside swallow eval. Per speech recommendation. CTA completed results in process. Tunneled catheter placement tomorrow morning and anticipate plasma phoresis after. Alert and oriented, slight intentional movement LUE, no movement in other extremities. Family at bedside.

## 2017-01-19 NOTE — PLAN OF CARE
"Problem: Goal Outcome Summary  Goal: Goal Outcome Summary  Outcome: No Change  A&OX4.  VSS.  Neuros intact. Severe weakness to all extremities. Denies numbness/tingling most of shift stating \"it's hard to describe, almost like restless leg feeling or heaviness.  I can feel you touching me, I just can't move it.\" Right shoulder pain and generalized achiness.  Norco effective for pain.  Repositioning q 1-2 hours this shift. 1st step mattress ordered. Lungs CTA.  Adequate I&Os.         "

## 2017-01-19 NOTE — PROGRESS NOTES
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  Stable sx's since 1/18.  Tunneled IJ catheter placed without complication; placement film looks good.  Ready to begin Plasma Exchange.  Consent obtained.  150% exchange today and tomorrow.      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 *               Interval History:   no new complaints, alert, oriented to person, place and time and doing well; no cp, sob, n/v/d, or abd pain.  Did not sleep well.  VS ok.  Same controlled a fib and no sign of resp compromise.  BP remains sl high, ~130-140/100.  Continues off usual BP meds.  Good UO overnight.  Wt sl higher.  Meds and labs reviewed.  Lytes nl.  Cr same, 1.15.  Ca++ and Alb sl lower, 8.6 and 2.9.  CBC and INR same               Medications:       sodium chloride (PF)  10 mL Intracatheter Q1H     heparin  3 mL Intracatheter Q24H     anticoagulant citrate  500-2,000 mL Apheresis Once     albumin human  5,000 mL Apheresis Once     calcium gluconate intermittent infusion with additives - doses under 5g  5 g Intravenous Once     heparin Lock (1000 units/mL High concentration)  2-6 mL Intracatheter Once     sodium chloride (PF)  3 mL Intracatheter Q8H     docusate sodium  100 mg Oral Daily     multivitamin, therapeutic  1 tablet Oral Daily     sennosides  1 tablet Oral BID     cholecalciferol  400 Units Oral Daily       - MEDICATION INSTRUCTIONS -                      Physical Exam:       Vital Sign Ranges  Temp:  [97.4  F (36.3  C)-98.1  F (36.7  C)] 97.4  F (36.3  C)  Heart Rate:  [] 75  Resp:  [9-25] 14  BP: (119-147)/() 138/102 mmHg  SpO2:  [90 %-99 %] 95 %    Weight, current:  77.3 kg (actual weight)  Weight change: 2.457 kg (5 lb 6.7 oz)    I/O last 3 completed shifts:  In: 795 [P.O.:795]  Out: 1350 [Urine:1350]    Physical Exam:   General:  Patient comfortable, in  no apparent distress.  Awake, alert, oriented x3.  Neck:  Supple, no JVD.  Lungs:  Clear to auscultation bilaterally.  Cardiac:  Irregular rate and rhythm, no murmurs, rub, or gallops.  Abdomen:  Soft, nontender, physiologic sounds.  Extremities:  Without edema.  2+ pulses.  Skin:  Warm, dry.  Neurologic:  Same generalized weakness.             Data:        Lab Results   Component Value Date     01/19/2017    Lab Results   Component Value Date    CHLORIDE 105 01/19/2017    Lab Results   Component Value Date    BUN 25 01/19/2017      Lab Results   Component Value Date    POTASSIUM 3.9 01/19/2017    Lab Results   Component Value Date    CO2 25 01/19/2017    Lab Results   Component Value Date    CR 1.15 01/19/2017        Lab Results   Component Value Date     01/19/2017     01/18/2017     01/17/2017     Lab Results   Component Value Date    POTASSIUM 3.9 01/19/2017    POTASSIUM 4.6 01/18/2017    POTASSIUM 3.3* 01/17/2017     Lab Results   Component Value Date    CHLORIDE 105 01/19/2017    CHLORIDE 103 01/18/2017    CHLORIDE 102 01/17/2017     Lab Results   Component Value Date    CO2 25 01/19/2017    CO2 25 01/18/2017    CO2 30 01/17/2017     Lab Results   Component Value Date    CR 1.15 01/19/2017    CR 1.16 01/18/2017    CR 1.17 01/17/2017     Lab Results   Component Value Date    BUN 25 01/19/2017    BUN 21 01/18/2017    BUN 16 01/17/2017     Lab Results   Component Value Date    HGB 10.0* 01/19/2017    HGB 10.7* 01/18/2017    HGB 10.0* 01/17/2017     No results found for: PH, PHARTERIAL, PO2, [id number], SAT, PCO2, HCO3, BASEEXCESS, RASHID, BEB          Michael Lopez MD  Nephrology; [employer]  [phone]

## 2017-01-19 NOTE — PLAN OF CARE
Problem: Goal Outcome Summary  Goal: Goal Outcome Summary  OT: Orders received and chart reviewed. Spoke with nurse and patient will be undergoing plasmaphoresis for the next two hours. Cancel OT eval today.

## 2017-01-19 NOTE — PLAN OF CARE
Problem: Goal Outcome Summary  Goal: Goal Outcome Summary  Outcome: No Change  VSS, A/O.  Tele is AFib-CVR.  Pt has been repositioned very frequently per request--very difficult to get him comfortable.  Son and vinny at bedside and are very helpful and supportive.  Voiding per urinal but needs help with placing urinal.  NPO for tunnel cath placement today and pheresis.

## 2017-01-19 NOTE — PROGRESS NOTES
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 Rivero pre-treatment, consent reviewed and signed.      Next treatment- 01/20/2017 @ 1300  "

## 2017-01-19 NOTE — PLAN OF CARE
Problem: Goal Outcome Summary  Goal: Goal Outcome Summary  Outcome: No Change  A&O. HTN, DBP low 100s. Tele A.fib with CVR. Tolerating regular diet. Unable to move extremities but able to ball fists and turn head. Repo q2h. Voiding. LP site CDI. Plan for tunnel cath tomorrow and plasmapheresis.

## 2017-01-19 NOTE — PROGRESS NOTES
[facility]    Hospitalist Progress Note    Date of Service (when I saw the patient): 01/19/2017    Assessment and Plan  Rick Teixeira is a 71 year old male who was admitted on 1/17/2017.  Past medical history including metastatic cholangiocarcinoma presenting with acute lower extremity weakness.        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  MRI brain with recent infarcts, however, location would not explain his diffuse weakness.  MRI spine (cervical, thoracic and lumbar) with metastatic lesions at C7, T2 and T12 but negative for cord impingement.  Underwent LP 1/18 with finding of elevated protein level with normal WBC.  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  Nephrology consulted, tunneled cath placed 1/19 by IR.  - initiating plasma exchange 1/19 (plan for 5-7 treatments)   - Neph and Neuro assistance much appreciated    Metastatic cholangiocarcinoma:  Follows with [facility].  MRI demonstrating metastatic disease as noted.  Radiation Oncology consulted, radiation treatment to be determined.  MOHPA consulted, was to start new FOLFOX regimen next week but further chemo on hold until functional status improved.  - may consider port placement prior to discharge (would pursue before initiating anticoagulation)  - Onc and Rad Onc recs appreciated    Small recent cortical brain infarcts: MRI of the brain showed small areas of restricted diffusion in the cortex of the right frontal lobe.  CT angio 1/18 without occlusion or stenosis.  No brain metastases identified.  Likely embolic in setting of A-fib.  TTE with EF 50-55%, negative bubble.  TSH, A1C wnl.  LDL elevated at 117.  - Neuro recommends anticoagulation with apixaban once plasma exchange complete, will also need statin  - permissive HTN  - SLP recommends regular diet    Paroxysmal A-fib:  Family reports history of A-fib diagnosed at prior  "hospitalization.  Decision was made against anticoagulation due to limited life expectancy despite hypercoagulable state of malignancy.  - continue tele, currently rate controlled without kelsie blocking agents  - metoprolol IV prn  - ultimately start apixaban once pheresis complete    Hypertension: Holding PTA atenolol and amlodipine for permissive HTN.    - resume BB and CCB as indicated  - lisinopril needs to be held until plasmapheresis complete    Hypokalemia: Patient with low potassium and magnesium on admission.  - electrolyte protocol    Anemia, macrocytic, thrombocytopenia, pancytopenia:  Presume secondary to chemotherapy.  MCV is 105.  White blood cell count and platelet count are also reduced.  Hemoglobin is 10.0 on admission.  - cell counts stable, monitor    DVT Prophylaxis: Pneumatic Compression Devices, start lovenox  Code Status: Full Code    Disposition: Expected discharge pending clinical improvement, completion of pheresis.    Enrique Ahn    Interval History  Reports weakness is unchanged to slightly improved, no change in sense of paresthesias.  Family concerned his speech sounds slurred but he attributes this to fatigue after only sleeping about 1 hour overnight.  Reports breathing is slightly \"different\" today but denies any shortness of breath.  Denies dysphagia.      -Data reviewed today: I reviewed all new labs and imaging results over the last 24 hours. I personally reviewed no images or EKG's today.    Physical Exam  Temp: 97.4  F (36.3  C) Temp src: Oral BP: 116/78 mmHg Pulse: 93 Heart Rate: 95 Resp: 12 SpO2: 98 % O2 Device: None (Room air)    Filed Vitals:    01/18/17 0430 01/19/17 0645 01/19/17 0926   Weight: 76.1 kg (167 lb 12.3 oz) 77.3 kg (170 lb 6.7 oz) 77.3 kg (170 lb 6.7 oz)     Vital Signs with Ranges  Temp:  [97.4  F (36.3  C)-98.1  F (36.7  C)] 97.4  F (36.3  C)  Pulse:  [83-95] 93  Heart Rate:  [] 95  Resp:  [9-25] 12  BP: ()/() 116/78 mmHg  SpO2:  [93 %-99 " %] 98 %  I/O last 3 completed shifts:  In: 795 [P.O.:795]  Out: 1350 [Urine:1350]    Constitutional: Well developed, well nourished male in no acute distress  Respiratory: Anterior lung sounds clear to auscultation bilaterally, no crackles or wheezes  Cardiovascular: irregularly irregular rhythm, normal rate, S1/S2 without murmur, rubs or gallops  GI: Abdomen soft, non-tender, non-distended, normal bowel sounds  Skin/Integumen:   Other:  alert and appropriate, ?dysarthria, cranial nerves grossly intact      Medications    - MEDICATION INSTRUCTIONS -         sodium chloride (PF)  10 mL Intracatheter Q1H     heparin  3 mL Intracatheter Q24H     heparin Lock (1000 units/mL High concentration)  2-6 mL Intracatheter Once     sodium chloride (PF)  3 mL Intracatheter Q8H     docusate sodium  100 mg Oral Daily     multivitamin, therapeutic  1 tablet Oral Daily     sennosides  1 tablet Oral BID     cholecalciferol  400 Units Oral Daily       Data    Recent Labs  Lab 01/19/17  0715 01/18/17  0510 01/17/17  1228   WBC 5.6 4.3 3.7*   HGB 10.0* 10.7* 10.0*   * 103* 105*   * 155 144*   INR 1.15*  --  1.11    137 137   POTASSIUM 3.9 4.6 3.3*   CHLORIDE 105 103 102   CO2 25 25 30   BUN 25 21 16   CR 1.15 1.16 1.17   ANIONGAP 8 9 5   SERINA 8.6 9.1 9.5   * 185* 99   ALBUMIN 2.9* 3.1* 3.2*   PROTTOTAL 6.7* 7.3 7.2   BILITOTAL 0.5 0.8 0.8   ALKPHOS 237* 243* 242*   ALT 45 33 28   AST 84* 79* 51*   TROPI  --  0.070*  --        Recent Results (from the past 24 hour(s))   CT Head Neck Angio w/o & w Contrast    Narrative    CT ANGIOGRAM OF THE HEAD AND NECK WITHOUT AND WITH CONTRAST  1/18/2017  6:08 PM     HISTORY: Admitted yesterday for generalized weakness. Evaluate for  stroke. Small infarcts in the cortex of the right frontal lobe on  yesterday's MRI. Known osseous metastases and C7-T1 epidural tumor.    TECHNIQUE:  Precontrast localizing scans were followed by CT  angiography with an injection of 70 mL  Isovue-370 IV with scans  through the head and neck.  Images were transferred to a separate 3-D  workstation where multiplanar reformations and 3-D images were  created.  Estimates of carotid stenoses are made relative to the  distal internal carotid artery diameters except as noted. Radiation  dose for this scan was reduced using automated exposure control,  adjustment of the mA and/or kV according to patient size, or iterative  reconstruction technique.    COMPARISON: MRI yesterday.    CT HEAD FINDINGS:  No contrast enhancing lesions.  Cerebral blood flow  is grossly normal.    CT ANGIOGRAM HEAD FINDINGS:  Arteries are widely patent with no  aneurysm, significant stenosis, occlusion or intraarterial thrombus.  Venous circulation is unremarkable.    CT ANGIOGRAM NECK FINDINGS:   Right carotid artery: No significant stenosis.      Left carotid artery: Minimal calcified plaque.  No significant  stenosis.      Vertebral arteries: No significant stenosis.      Other findings: There is a dominant poorly circumscribed nodule in the  right upper lobe about 2 cm diameter. There are also numerous much  smaller peripheral nodules in the upper lobes. The appearance is  suggestive of metastatic disease. Destructive lesion is seen in the C7  vertebral body.      Impression    IMPRESSION:   1. Minimal plaque in the proximal left internal carotid artery.  2. Otherwise normal CT angiogram of the head and neck.  3. Bone and lung metastases.    CANELO CHOI MD

## 2017-01-19 NOTE — PROGRESS NOTES
Stable 1st Plasma Exchange.  150% plasma volume replaced with Albumin 5%.  IJ catheter working well.  VS stable throughout.  Next treatment 1/20.    Michael Lopez MD  Nephrology; [employer]  [phone]

## 2017-01-19 NOTE — CONSULTS
NEPHROLOGY CONSULTATION      DATE OF SERVICE:  01/18/2017      REQUESTING PHYSICIAN:  Dr. Gauthier      HISTORY OF PRESENT ILLNESS:  Rick Teixeira is a 71-year-old man whom we were asked to see to assist in evaluation and management of symptoms of rapid-onset generalized weakness.  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      Mr. Teixeira has a complicated medical history otherwise.  He has a diagnosis of metastatic cholangiocarcinoma with bone metastases; the possibility of a paraneoplastic syndrome was entertained but felt unlikely.  The patient has a history of chronic atrial fibrillation but no other significant heart history.  He has not been on recent anticoagulant therapy.  He has had previous brain imaging that shows small old right cortical ischemic strokes.  He has other diagnoses which include hypertension for which he has been on a 3-drug regimen of amlodipine, lisinopril and atenolol; all of these have been withheld since his admission with weakness yesterday afternoon.  The key diagnostic tests used in the diagnosis of Guillain-[place] have been analysis of the spinal fluid, which shows an elevated protein with no other abnormalities, and also a compatible EMG.      MEDICATIONS:   1.  Docusate.   2.  Cholecalciferol.   3.  Multivitamin.   4.  Senokot.      ALLERGIES:  He has allergies to cephalosporins and penicillins.      LABORATORY DATA:  Since admission includes a CBC which demonstrates normochromic normocytic anemia with a hemoglobin of approximately 10 to 10.5.  White blood cell count and platelet count are normal.  Differential has not been done.  INR is 1.11.  Electrocardiogram shows atrial fibrillation with a controlled ventricular response and no other significant findings.  Of unclear significance is T-wave inversion in the anterior precordial leads.  An echocardiogram shows borderline reduced  left ventricular systolic function with ejection fraction 50% to 55%.  The right ventricular systolic function is normal.  There is a trivial pericardial effusion.  The left ventricle shows no sign of hypertrophy.  Mild global hypokinesia is seen.  There are no significant valvular abnormalities and the IVC appears normal.  Blood chemistries show normal electrolytes, creatinine is approximately 1.15 and a BUN of 21.  Calcium is satisfactory at 9.1.  Albumin is 3.1.  There is a modest increase in alkaline phosphatase to 243 and AST at 79, but other liver function tests are normal.  Initial blood glucose is 99 and on repeat today 185.  The patient did receive a course of dexamethasone yesterday, which has subsequently been discontinued.      PHYSICAL EXAMINATION:   GENERAL:  Mr. Teixeira is alert, calm.  He seems lucid.  There is no speech abnormality.  There is no sign of facial weakness.  He does not complain of any respiratory distress.   VITAL SIGNS:  Temperature is normal, pulse is 80-90 and irregular rate.  Blood pressure most recently is 119/87, earlier about 135-145 over 90-95.  Respirations are unlabored at about 14-16.  Room air oxygen saturation is 97% to 98%.   SKIN:  Shows good turgor.  It is somewhat pale.   HEENT:  Mucous membranes are moist.   NECK:  Supple.  There is no JVD.  Carotid pulses are strong.   LUNGS:  Clear.   HEART:  Normal, aside from the irregular rhythm.  There is no murmur, rub or gallop.   ABDOMEN:  Soft.  Bowel sounds are normally active.  No tenderness, organomegaly or masses are noted.   EXTREMITIES:  Warm.  Peripheral pulses are strong.  There are no significant joint deformities.  There is no significant edema.  Mr. Teixeira's weakness is seemingly symmetric and involves all 4 limbs but seems at this time to be sparing his respiratory muscles.      Mr. Teixeira's clinical presentation is compatible with an acute polyneuropathy, suggesting a Guillain-[place] type immunologic etiology.   I agree with the indication for plasma exchange therapy.  The patient has given his verbal consent for placement of a tunneled IJ double-lumen catheter for access and the apheresis treatment.  Interventional Radiology will place this catheter on the morning of , and the first plasma exchange treatment will begin shortly thereafter.  We anticipate an initial course of 150% plasma volume exchanges at least for the first 2 treatments.  Subsequent total treatment plan of 5-7 sessions will then take place on about an every other day basis after the initial 2 treatments are done on consecutive days.      Thank you for the opportunity to assist in Mr. Teixeira's care.  We will follow him with you as appropriate during the remainder of his hospital stay.         CHERYL MORENO MD             D: 2017 16:37   T: 2017 17:27   MT: TONE      Name:     RODNEY TEIXEIRA   MRN:      [id number]        Account:       [id number]   :      1945           Consult Date:  2017      Document: [id number]       cc: Mindy Gauthier MD

## 2017-01-19 NOTE — PROGRESS NOTES
[facility]    Neurology Progress Note    Date of Service (when I saw the patient): 01/19/2017     Today's developments:PLEX day 1 on 1-19-17- he tolerated well.  Sent out paraneoplastic panels.  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    Assessment and Plan  Rick Teixeira is a 71 year old male who was admitted on 1/17/2017. I was asked to see the patient for generalized weakness.      #. 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  --I discussed his presentation with Dr. Lombardo- she has not seen any of his chemo treatments result is such severe and quick weakness, including gemcitabine which can cause slowly progressive weakness.  --PLEX day 1 on 1-19-17  --patient non-ambulatory, hypercoag (strokes), so PLEX is more appropriate than IVIG if he tolerates it.  --NIF, FVC BID- stable so far, more often if respiratory weakness or abnormal values found  #anemic- may add to weakness.  #. Metastatic cholangiocarcinoma-now in spine: oncology has seen  I discussed the  above diagnosis, assessment, testing, and results with the patient and his family.    Patsy Gauthier MD          Interval History  He feels that the weakness is unchanged from before.  No speech problems, no problems with swallowing.  Pain in the back that is chronic, but worse with having to sleep on his back.    Physical Exam  Temp: 97.9  F (36.6  C) Temp src: Oral BP: 114/88 mmHg Pulse: 87 Heart Rate: 103 Resp: 16 SpO2: 97 % O2 Device: None (Room air)    Filed Vitals:    01/18/17 0430 01/19/17 0645 01/19/17 0926   Weight: 76.1 kg (167 lb 12.3 oz) 77.3 kg (170 lb 6.7 oz) 77.3 kg (170 lb 6.7 oz)     Vital Signs with Ranges  Temp:  [97.4  F (36.3  C)-98.1  F (36.7  C)] 97.9  F (36.6  C)  Pulse:  [83-98] 87  Heart Rate:  [] 103  Resp:  [9-19] 16  BP: ()/() 114/88 mmHg  SpO2:  [93 %-99 %] 97 %  I/O last 3 completed shifts:  In: 795 [P.O.:795]  Out: 1350 [Urine:1350]      General Appearance:  No acute distress  Neuro:       Mental Status Exam:    A,A, fully oriented       Cranial Nerves: EOMI, face equal and symmetric and normal strength , speech normal       Motor:   3/5 shoulder shrug, 2/5 throughout BUE otherwise.  3/5 L hip flex, 2/5 R hip flex, 3/5 B knee flexion, 3/5 B toe dorsiflexion       Reflexes:  0 throughout, downgoing toes  CV: RRR nl s1, s2  Resp: CTAB    Extremities: No clubbing, no cyanosis, no edema    Medications    - MEDICATION INSTRUCTIONS -         enoxaparin  40 mg Subcutaneous Q24H     sodium chloride (PF)  3 mL Intracatheter Q8H     docusate sodium  100 mg Oral Daily     multivitamin, therapeutic  1 tablet Oral Daily     sennosides  1 tablet Oral BID     cholecalciferol  400 Units Oral Daily       Data  Results for orders placed or performed during the hospital encounter of 01/17/17 (from the past 24 hour(s))   Nephrology IP Consult: Patient to be seen: Routine - within 24 hours; plasmaphoresis for gullian-barre; Consultant may enter orders: Yes    Narrative     Michael Lopez MD     2017  4:24 PM  See dictation.    Guillain-[place] syndrome.  Asked to assist re: treatment with   plasma exchange.  Pt consents to this treatment and placement of   tunneled IJ dialysis catheter for apheresis access.  Will begin    AM.    Thanks.    Michael Lopez MD  Nephrology; [facility], [facility]  [phone]         Echo Bubble Study with Lumason    Narrative    Interpretation Summary                    [facility]  Echocardiography Laboratory  [address] [place] [address]        Name: RODNEY MARIE  MRN: [id number]  : 1945  Study Date: 2017 02:46 PM  Age: 71 yrs  Gender: Male  Patient Location: [facility]  Reason For Study: Cerebrovascular Incident  Ordering Physician: RACHELLE GIL  Referring Physician: Gianna Baptiste [facility]  Performed By: Sulma Roberson RDCS     BSA: 1.9 m2  Height: 70 in  Weight: 167 lb  HR: 89  BP: 125/97 mmHg  ______________________________________________________________________________        Procedure  Complete Portable Bubble Echo Adult. Contrast Lumason.  ______________________________________________________________________________     Interpretation Summary     Left ventricular systolic function is borderline reduced.The left ventricle  visual ejection fraction is estimated at 50-55%.  The right ventricular systolic function is normal.  A contrast injection (Bubble Study) was performed that was negative for flow  across the interatrial septum.  Trivial to small pericardial effusion.  The rhythm was atrial fibrillation.     ______________________________________________________________________________           Left Ventricle  The left ventricle is normal in size. There is normal left ventricular wall  thickness. The visual ejection fraction is estimated at 50-55%. Left  ventricular systolic function is borderline reduced. There is borderline-mild  global hypokinesia of the  left ventricle.     Right Ventricle  The right ventricle is normal size. The right ventricular systolic function  is normal.  Atria  The left atrium is mildly dilated. The right atrium is mildly dilated. There  is no color Doppler evidence of an atrial shunt. A contrast injection (Bubble  Study) was performed that was negative for flow across the interatrial  septum.     Mitral Valve  There is trace mitral regurgitation.     Tricuspid Valve  Right ventricular systolic pressure could not be approximated due to  inadequate tricuspid regurgitation.     Aortic Valve  The aortic valve is trileaflet. There is trace aortic regurgitation. No  aortic stenosis is present.     Pulmonic Valve  The pulmonic valve is not well visualized. There is no pulmonic valvular  stenosis.     Vessels  The aortic root is normal size. Normal size ascending aorta. The IVC is  normal in size and reactivity with respiration, suggesting normal central  venous pressure.  Pericardium  trivial to small pericardial effusion.     Rhythm  The rhythm was atrial fibrillation.     ______________________________________________________________________________  MMode/2D Measurements & Calculations  IVSd: 1.1 cm  LVIDd: 4.8 cm  LVIDs: 3.7 cm  LVPWd: 1.1 cm  FS: 23.6 %  EDV(Teich): 109.2 ml  ESV(Teich): 57.8 ml  LV mass(C)d: 192.9 grams  Ao root diam: 3.6 cm  LA dimension: 4.2 cm  asc Aorta Diam: 3.6 cm  LA/Ao: 1.2  LVOT diam: 2.3 cm  LVOT area: 4.3 cm2  LA Volume (BP): 56.8 ml     LA Volume Index (BP): 29.4 ml/m2        Doppler Measurements & Calculations  MV E max caden: 116.5 cm/sec  MV P1/2t max caden: 113.2 cm/sec  MV P1/2t: 42.9 msec  MVA(P1/2t): 5.1 cm2  MV dec slope: 774.0 cm/sec2  MV dec time: 0.14 sec              ______________________________________________________________________________        Report approved by: Reginaldo Benitez 01/18/2017 03:56 PM      CT Head Neck Angio w/o & w Contrast    Narrative    CT ANGIOGRAM OF THE HEAD AND NECK  WITHOUT AND WITH CONTRAST  1/18/2017  6:08 PM     HISTORY: Admitted yesterday for generalized weakness. Evaluate for  stroke. Small infarcts in the cortex of the right frontal lobe on  yesterday's MRI. Known osseous metastases and C7-T1 epidural tumor.    TECHNIQUE:  Precontrast localizing scans were followed by CT  angiography with an injection of 70 mL Isovue-370 IV with scans  through the head and neck.  Images were transferred to a separate 3-D  workstation where multiplanar reformations and 3-D images were  created.  Estimates of carotid stenoses are made relative to the  distal internal carotid artery diameters except as noted. Radiation  dose for this scan was reduced using automated exposure control,  adjustment of the mA and/or kV according to patient size, or iterative  reconstruction technique.    COMPARISON: MRI yesterday.    CT HEAD FINDINGS:  No contrast enhancing lesions.  Cerebral blood flow  is grossly normal.    CT ANGIOGRAM HEAD FINDINGS:  Arteries are widely patent with no  aneurysm, significant stenosis, occlusion or intraarterial thrombus.  Venous circulation is unremarkable.    CT ANGIOGRAM NECK FINDINGS:   Right carotid artery: No significant stenosis.      Left carotid artery: Minimal calcified plaque.  No significant  stenosis.      Vertebral arteries: No significant stenosis.      Other findings: There is a dominant poorly circumscribed nodule in the  right upper lobe about 2 cm diameter. There are also numerous much  smaller peripheral nodules in the upper lobes. The appearance is  suggestive of metastatic disease. Destructive lesion is seen in the C7  vertebral body.      Impression    IMPRESSION:   1. Minimal plaque in the proximal left internal carotid artery.  2. Otherwise normal CT angiogram of the head and neck.  3. Bone and lung metastases.    CANELO CHOI MD   Vitamin B12   Result Value Ref Range    Vitamin B12 >6000 (H) 193 - 986 pg/mL   Folate   Result Value Ref Range     Folate 17.7 >5.4 ng/mL   Comprehensive metabolic panel   Result Value Ref Range    Sodium 138 133 - 144 mmol/L    Potassium 3.9 3.4 - 5.3 mmol/L    Chloride 105 94 - 109 mmol/L    Carbon Dioxide 25 20 - 32 mmol/L    Anion Gap 8 3 - 14 mmol/L    Glucose 112 (H) 70 - 99 mg/dL    Urea Nitrogen 25 7 - 30 mg/dL    Creatinine 1.15 0.66 - 1.25 mg/dL    GFR Estimate 63 >60 mL/min/1.7m2    GFR Estimate If Black 76 >60 mL/min/1.7m2    Calcium 8.6 8.5 - 10.1 mg/dL    Bilirubin Total 0.5 0.2 - 1.3 mg/dL    Albumin 2.9 (L) 3.4 - 5.0 g/dL    Protein Total 6.7 (L) 6.8 - 8.8 g/dL    Alkaline Phosphatase 237 (H) 40 - 150 U/L    ALT 45 0 - 70 U/L    AST 84 (H) 0 - 45 U/L   CBC with platelets   Result Value Ref Range    WBC 5.6 4.0 - 11.0 10e9/L    RBC Count 2.91 (L) 4.4 - 5.9 10e12/L    Hemoglobin 10.0 (L) 13.3 - 17.7 g/dL    Hematocrit 30.3 (L) 40.0 - 53.0 %     (H) 78 - 100 fl    MCH 34.4 (H) 26.5 - 33.0 pg    MCHC 33.0 31.5 - 36.5 g/dL    RDW 17.1 (H) 10.0 - 15.0 %    Platelet Count 135 (L) 150 - 450 10e9/L   INR   Result Value Ref Range    INR 1.15 (H) 0.86 - 1.14         Images and Procedures:  I personally reviewed the images of the following studies:  EMG: prolonged sensory distal latencies, prolonged F waves, decreased ampitudes, NCS dispersion present.  NIF and FVC are normal.

## 2017-01-19 NOTE — CONSULTS
RADIATION ONCOLOGY CONSULTATION      DATE OF SERVICE:  01/18/2017      REQUESTING PHYSICIAN:  Dr. Lei Starks       OTHER PHYSICIANS:  Dr. Mindy Lombardo, Dr. Patsy Gauthier.      REASON FOR CONSULTATION:  Consideration of palliative radiotherapy in the setting of multiple presumed spine metastases in the setting of leg weakness.      HISTORY OF PRESENT ILLNESS:  Mr. Rick Teixeira is a 71-year-old gentleman with progressive metastatic intrahepatic cholangiocarcinoma, who presented with profound weakness of all extremities, as described below.  Of note, the patient has a history of left testicular seminoma diagnosed in 2001, treated with left radical orchiectomy at [facility] in [place], followed by adjuvant radiotherapy to the kelsie regions (records of this are unavailable to me currently).  The patient more recently presented in 07/2016 with progressive shortness of breath and cough, with eventual imaging including torso CT showing an 8 cm heterogeneous mass in the right lobe of the liver with underlying cirrhosis, multiple pulmonary nodules bilaterally, the largest measuring 1.3 cm, bilateral pleural nodularity, and bilateral hilar and mediastinal adenopathy.  Biopsy of the liver mass on 07/11/2016 revealed poorly differentiated adenocarcinoma, most consistent with intrahepatic cholangiocarcinoma.  The patient has undergone evaluation both at the [facility] and is also cared for by Dr. Lombardo.  He began cisplatin/gemcitabine chemotherapy on 08/03/2016, and after 7 cycles of this he reportedly developed disease progression based on CT scans at the [facility] in 12/2016 (these scans are unavailable to me at this time).  His chemotherapy was therefore discontinued on 12/29/2016.      The patient has since not had any rapidly progressive symptoms including no increasing dyspnea.  However, approximately 48 hours ago the patient noted a relatively rapid onset of weakness in all 4 extremities, the  right greater than the left, such that he became unable to ambulate or even rise from a supine position.  He was evaluated in the [facility] Emergency Department on 01/17/2017, at which time spine MRI revealed likely metastases to C7, T2 and T3, with a small amount of epidural tumor at the right T2 level that was also extending into the right T2-3 neural foramen, but with no central stenosis at that level.  There were additional but less concerning lesions involving T9 and T12.  Degenerative changes and bulging disks in the lumbar spine caused moderate bilateral L4-5 foraminal stenosis, but there was no evidence of metastatic disease in the lumbar spine.  Brain MRI on the same day revealed small areas of restricted diffusion in the right frontal cortex consistent with small recent cortical infarcts, but no mass lesions were seen.      The patient was evaluated by Dr. Gauthier of Neurology and was noted to have absent reflexes throughout.  He has undergone workup for possible Guillain-[place] syndrome, including lumbar puncture earlier this morning, which revealed an elevated CSF protein, consistent with this diagnosis.  Notably, IV dexamethasone had been started last night after his admission and he developed an episode of encephalopathy/confusion overnight possibly related to this, but describes essentially no improvement in his diffuse weakness of all extremities.  He denies any pain/numbness/tingling involving any of his extremities.  He notes mild mid-back pain, but describes this as chronic and related to prior sports injuries, with no recent progression.      REVIEW OF SYSTEMS:  Neurologic symptoms as described above.  The patient denies any fevers, chills, headaches, vision changes, sore throat, chest pain, palpitations, shortness of breath, cough, abdominal pain, nausea, diarrhea, dysuria, muscle aches, sensitivity to cold, easy bruising.  All other systems are negative.      PAST  MEDICAL/SURGICAL HISTORY:  Metastatic intrahepatic cholangiocarcinoma as well as remote history of testicular seminoma, as described above.  Atrial fibrillation.  Left hip arthritis.  Hypertension.      HOME MEDICATIONS:  Lisinopril, atenolol, amlodipine, docusate, sennosides, vitamin D, multivitamin.      ALLERGIES:  Cefazolin, penicillins.      SOCIAL HISTORY:  The patient lives in [place], [place] with his wife.  He works as a , and previously served in [place] in the .  He is a former smoker and denies significant alcohol use.      FAMILY HISTORY:  Remarkable for Stewart syndrome.  His father had colon cancer at age 50, his mother had ovarian cancer, and 2 sisters had colorectal cancers.      PHYSICAL EXAMINATION:  ECOG performance status is 4.  Pain scale:  0/10.   VITAL SIGNS:  Blood pressure 123/91, heart rate 96, temperature 98.1, respirations 14, oxygen saturation 98% on room air.   GENERAL:  The patient is alert and oriented, and is lying in a hospital bed in no acute distress.   HEAD:  Normocephalic and atraumatic.   ENT:  Mucous membranes are moist and without lesions.   NECK:  Supple and without palpable adenopathy.   HEART:  Regular rate and rhythm.   LUNGS:  Clear to auscultation.   ABDOMEN:  Soft and nondistended.   BACK:  No spine tenderness to deep palpation or percussion.   EXTREMITIES:  No edema in bilateral upper and lower extremities.   SKIN:  No rashes or lesions.   NEUROLOGIC:  Profound weakness in all extremities, with strength 1/5 in right upper and lower extremities and 2/5 in the left upper and lower extremities.  Sensation to light touch is intact throughout.      IMPRESSIONS AND RECOMMENDATIONS:  Mr. Rick Teixeira is a 71-year-old gentleman with progressive metastatic intrahepatic cholangiocarcinoma as described above, who presented to the emergency department last night due to relatively rapid onset of generalized significant weakness  involving all 4 extremities.  Spine MRI reveals several lesions, most prominently in the lower cervical spine and upper thoracic spine with some areas of neural foraminal stenosis, but no central canal stenosis or signs of spinal cord compression throughout.  He has undergone neurologic workup, with cerebrospinal fluid showing elevated protein levels, potentially consistent with Guillain-Westcliffe syndrome.      The patient at the time of this dictation is reportedly undergoing treatment for potential Guillain-Westcliffe syndrome.  I agree that the clinical picture does not appear to be consistent with spinal cord compression or other neurologic compromise due to progressive metastatic disease.  This is for several reasons:  Spine MRI does not indicate any signs of neurologic compromise, his symptoms did not improve with IV steroids overnight, his absence of reflexes is not consistent with neurologic compromise, and his elevated CSF protein levels may not have a more compelling explanation.  I will remain available to consider radiotherapy if it becomes clear that his metastatic disease burden is symptomatic or otherwise requires treatment.      Thank you again for asking me to evaluate Mr. Teixeira.         SO BARBOZA MD           D: 2017 16:44   T: 2017 17:40   MT:       Name:     RODNEY TEIXEIRA   MRN:      [id number]        Account:       [id number]   :      1945           Consult Date:  2017      Document: [id number]       cc: Lei Gauthier MD

## 2017-01-19 NOTE — PLAN OF CARE
Problem: Goal Outcome Summary  Goal: Goal Outcome Summary  PT: Patient requested to hold PT evaluation due to just starting to eat. Patient is agreeable and motivated to participate in therapy session. Per discussion with patient, will reschedule PT evaluation for tomorrow, 1/20/17.

## 2017-01-19 NOTE — PLAN OF CARE
Problem: Goal Outcome Summary  Goal: Goal Outcome Summary  PT: Orders received and chart reviewed. Patient out of room at procedure upon arrival of therapist. RN requested PT attempt back this PM as able.

## 2017-01-19 NOTE — PROGRESS NOTES
"[place] [facility] Progress Note          Assessment and Plan:   71 year old male with metastatic intrahepatic cholangiocarcinoma presenting with sudden onset bilateral upper and lower extremity weakness.    1. Metastatic intrahepatic cholangiocarcinoma with pulmonary, hepatic, and skeletal metastases  - Patient had recent scans performed at [facility] that demonstrated disease progression after 7 cycles of 1st line therapy with gemcitabine and cisplatin.  Plan was to start 2nd line therapy with FOLFOX next week -- all chemo will be placed on hold until clinical improvement in his GBS/weakness.  Can consider a port placement prior to eventual hospital discharge.      2. Generalized weakness, sudden onset  -  Appreciate Neurology evaluation and recommendations. Discussed with Dr. Barriga and Dr. Lopez. 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  Appreciate Nephrology assistance -- started plasmapheresis this AM (17).    3. Anemia related to recent chemotherapy and cancer  - Hgb remains stable.  Continue serial CBC monitoring.    4. Insomnia  - Will restart on mirtazipine to help with sleep as well as appetite and mood.    Full code               Interval History:   Patient reports trouble sleeping.  Feels tired but okay.              Review of Systems:   As per subjective, otherwise 5 systems reviewed and negative.           Physical Exam:   Blood pressure 132/104, pulse 84, temperature 97.4  F (36.3  C), temperature source Oral, resp. rate 16, height 1.778 m (5' 10\"), weight 77.3 kg (170 lb 6.7 oz), SpO2 98 %.      Vital Sign Ranges  Temperature Temp  Av.8  F (36.6  C)  Min: 97.4  F (36.3  C)  Max: 98.1  F (36.7  C)   Blood pressure Systolic (24hrs), Av mmHg, Min:119 mmHg, Max:147 mmHg       Diastolic (24hrs), Av mmHg, Min:85 mmHg, Max:105 mmHg      Pulse No Data Recorded   Respirations Resp  Av.8  Min: 9  Max: 25   Pulse oximetry SpO2  Av.9 %  Min: " 90 %  Max: 99 %         Intake/Output Summary (Last 24 hours) at 01/19/17 0940  Last data filed at 01/19/17 0511   Gross per 24 hour   Intake    795 ml   Output   1150 ml   Net   -355 ml       Constitutional:   No acute distress.   Extremities:   No clubbing, cyanosis, or edema.   Neurological:   No change in strength in the upper and lower extremities.            Medications:     No current outpatient prescriptions on file.                Data:     Results for orders placed or performed during the hospital encounter of 01/17/17 (from the past 24 hour(s))   XR Lumbar Puncture Spinal Tap Diag    Narrative    EXAM: XR LUMBAR PUNCTURE SPINAL TAP DIAGNOSTIC  1/18/2017 9:43 AM       History:  Extremity weakness, rule out Guillain [place].     PROCEDURE: The patient consented for a lumbar puncture. The benefits  and risks of the procedure were explained to the patient, including  but not limited to worsening headache, hemorrhage, infection, lower  extremity pain, or nerve root injury. The patient was sterilely  prepped and draped with the patient in the supine position, over the  lower back. Under fluoroscopic guidance, the interlaminar spaces were  noted. 1% lidocaine was administered for local anesthetic over the  L2-3 interlaminar space, and a 22 gauge needle was advanced into the  thecal sac under fluoroscopic guidance. There was initial aspiration  of clear CSF. About 8 cc's total were aspirated.  The needle was  removed. There were no immediate complications associated with the  procedure.       Fluoro time: 0.2 minutes.   Number of Images: 2      Impression    IMPRESSION: Successful lumbar puncture.    JUAN NGUYEN PA-C   Nephrology IP Consult: Patient to be seen: Routine - within 24 hours; plasmaphoresis for gullian-barre; Consultant may enter orders: Yes    Narrative    Michael Lopez MD     1/18/2017  4:24 PM  See dictation.    Guillain-[place] syndrome.  Asked to assist re: treatment with   plasma  exchange.  Pt consents to this treatment and placement of   tunneled IJ dialysis catheter for apheresis access.  Will begin    AM.    Thanks.    Michael Lopez MD  Nephrology; [facility], [facility]  [phone]         Echo Bubble Study with Lumason    Narrative    Interpretation Summary                    [facility] [place]  Echocardiography Laboratory  [address] [place] [address]        Name: RODNEY MARIE  MRN: [id number]  : 1945  Study Date: 2017 02:46 PM  Age: 71 yrs  Gender: Male  Patient Location: [facility]  Reason For Study: Cerebrovascular Incident  Ordering Physician: RACHELLE GIL  Referring Physician: Gianna Baptistellet Nuevo  Performed By: Sulma Roberson RDCS     BSA: 1.9 m2  Height: 70 in  Weight: 167 lb  HR: 89  BP: 125/97 mmHg  ______________________________________________________________________________        Procedure  Complete Portable Bubble Echo Adult. Contrast Lumason.  ______________________________________________________________________________     Interpretation Summary     Left ventricular systolic function is borderline reduced.The left ventricle  visual ejection fraction is estimated at 50-55%.  The right ventricular systolic function is normal.  A contrast injection (Bubble Study) was performed that was negative for flow  across the interatrial septum.  Trivial to small pericardial effusion.  The rhythm was atrial fibrillation.     ______________________________________________________________________________           Left Ventricle  The left ventricle is normal in size. There is normal left ventricular wall  thickness. The visual ejection fraction is estimated at 50-55%. Left  ventricular systolic function is borderline reduced. There is borderline-mild  global hypokinesia of the left ventricle.     Right Ventricle  The right ventricle is normal size. The right ventricular systolic function  is normal.  Atria  The left  atrium is mildly dilated. The right atrium is mildly dilated. There  is no color Doppler evidence of an atrial shunt. A contrast injection (Bubble  Study) was performed that was negative for flow across the interatrial  septum.     Mitral Valve  There is trace mitral regurgitation.     Tricuspid Valve  Right ventricular systolic pressure could not be approximated due to  inadequate tricuspid regurgitation.     Aortic Valve  The aortic valve is trileaflet. There is trace aortic regurgitation. No  aortic stenosis is present.     Pulmonic Valve  The pulmonic valve is not well visualized. There is no pulmonic valvular  stenosis.     Vessels  The aortic root is normal size. Normal size ascending aorta. The IVC is  normal in size and reactivity with respiration, suggesting normal central  venous pressure.  Pericardium  trivial to small pericardial effusion.     Rhythm  The rhythm was atrial fibrillation.     ______________________________________________________________________________  MMode/2D Measurements & Calculations  IVSd: 1.1 cm  LVIDd: 4.8 cm  LVIDs: 3.7 cm  LVPWd: 1.1 cm  FS: 23.6 %  EDV(Teich): 109.2 ml  ESV(Teich): 57.8 ml  LV mass(C)d: 192.9 grams  Ao root diam: 3.6 cm  LA dimension: 4.2 cm  asc Aorta Diam: 3.6 cm  LA/Ao: 1.2  LVOT diam: 2.3 cm  LVOT area: 4.3 cm2  LA Volume (BP): 56.8 ml     LA Volume Index (BP): 29.4 ml/m2        Doppler Measurements & Calculations  MV E max caden: 116.5 cm/sec  MV P1/2t max caden: 113.2 cm/sec  MV P1/2t: 42.9 msec  MVA(P1/2t): 5.1 cm2  MV dec slope: 774.0 cm/sec2  MV dec time: 0.14 sec              ______________________________________________________________________________        Report approved by: Reginaldo Benitez 01/18/2017 03:56 PM      CT Head Neck Angio w/o & w Contrast    Narrative    CT ANGIOGRAM OF THE HEAD AND NECK WITHOUT AND WITH CONTRAST  1/18/2017  6:08 PM     HISTORY: Admitted yesterday for generalized weakness. Evaluate for  stroke. Small infarcts in the  cortex of the right frontal lobe on  yesterday's MRI. Known osseous metastases and C7-T1 epidural tumor.    TECHNIQUE:  Precontrast localizing scans were followed by CT  angiography with an injection of 70 mL Isovue-370 IV with scans  through the head and neck.  Images were transferred to a separate 3-D  workstation where multiplanar reformations and 3-D images were  created.  Estimates of carotid stenoses are made relative to the  distal internal carotid artery diameters except as noted. Radiation  dose for this scan was reduced using automated exposure control,  adjustment of the mA and/or kV according to patient size, or iterative  reconstruction technique.    COMPARISON: MRI yesterday.    CT HEAD FINDINGS:  No contrast enhancing lesions.  Cerebral blood flow  is grossly normal.    CT ANGIOGRAM HEAD FINDINGS:  Arteries are widely patent with no  aneurysm, significant stenosis, occlusion or intraarterial thrombus.  Venous circulation is unremarkable.    CT ANGIOGRAM NECK FINDINGS:   Right carotid artery: No significant stenosis.      Left carotid artery: Minimal calcified plaque.  No significant  stenosis.      Vertebral arteries: No significant stenosis.      Other findings: There is a dominant poorly circumscribed nodule in the  right upper lobe about 2 cm diameter. There are also numerous much  smaller peripheral nodules in the upper lobes. The appearance is  suggestive of metastatic disease. Destructive lesion is seen in the C7  vertebral body.      Impression    IMPRESSION:   1. Minimal plaque in the proximal left internal carotid artery.  2. Otherwise normal CT angiogram of the head and neck.  3. Bone and lung metastases.    CANELO CHOI MD   Vitamin B12   Result Value Ref Range    Vitamin B12 >6000 (H) 193 - 986 pg/mL   Folate   Result Value Ref Range    Folate 17.7 >5.4 ng/mL   Comprehensive metabolic panel   Result Value Ref Range    Sodium 138 133 - 144 mmol/L    Potassium 3.9 3.4 - 5.3 mmol/L     Chloride 105 94 - 109 mmol/L    Carbon Dioxide 25 20 - 32 mmol/L    Anion Gap 8 3 - 14 mmol/L    Glucose 112 (H) 70 - 99 mg/dL    Urea Nitrogen 25 7 - 30 mg/dL    Creatinine 1.15 0.66 - 1.25 mg/dL    GFR Estimate 63 >60 mL/min/1.7m2    GFR Estimate If Black 76 >60 mL/min/1.7m2    Calcium 8.6 8.5 - 10.1 mg/dL    Bilirubin Total 0.5 0.2 - 1.3 mg/dL    Albumin 2.9 (L) 3.4 - 5.0 g/dL    Protein Total 6.7 (L) 6.8 - 8.8 g/dL    Alkaline Phosphatase 237 (H) 40 - 150 U/L    ALT 45 0 - 70 U/L    AST 84 (H) 0 - 45 U/L   CBC with platelets   Result Value Ref Range    WBC 5.6 4.0 - 11.0 10e9/L    RBC Count 2.91 (L) 4.4 - 5.9 10e12/L    Hemoglobin 10.0 (L) 13.3 - 17.7 g/dL    Hematocrit 30.3 (L) 40.0 - 53.0 %     (H) 78 - 100 fl    MCH 34.4 (H) 26.5 - 33.0 pg    MCHC 33.0 31.5 - 36.5 g/dL    RDW 17.1 (H) 10.0 - 15.0 %    Platelet Count 135 (L) 150 - 450 10e9/L   INR   Result Value Ref Range    INR 1.15 (H) 0.86 - 1.14

## 2017-01-20 NOTE — PROGRESS NOTES
Apheresis - Plasma Exchange    Rick Teixeira was run in room 305 for a 1.5 volume apheresis treatment using the Optia apheresis machine. Consent verified.     Run number - 2 of 5   Height - 178 cm  Weight - 77 kg  HCT - 33%  Plasma Volume and type - 5000 ml 5% albumin  Calcium Replacement - 6 gm Ca Gluc IV  Inlet speed - 60 ml/min  Fluid Balance - 100%  ACD-A ratio - 13:1  Access - R tunneled PCAD  Premeds - None    Treatment notes - A&O, VSS. Unable to move upper or lower extremities r/t GBS. C/O being too warm and also nausea about 30 minutes into tx. Gave 4 mg zofran IV and secured the blood warmer. Continued tx without issue.     Seen by Dr. Lopez. VSS at end of tx. Report given to Sarah Freitas RN.     Next treatment - Sunday, January 22 @ 0800.    ARIELA PowersN, RN  [facility]  [facility]

## 2017-01-20 NOTE — PLAN OF CARE
Problem: Goal Outcome Summary  Goal: Goal Outcome Summary  Outcome: No Change  Pt is A&O, AVSS on RA. Neuro stable, still unable to move bilateral upper and lower extremities. Complained of pain to shoulders, norco given x 1. Pt was incontinent x 1.

## 2017-01-20 NOTE — PLAN OF CARE
Problem: Goal Outcome Summary  Goal: Goal Outcome Summary    OT: Eval completed, treatment initiated. Pt demonstrates significant diffuse weakness; able to wiggle fingers (soft  with L hand), 2/5 wrist, 1/5 elbow and 3/5 shoulder shrug. Intact to light touch. Participated in log roll L and R with MAX A of 2, able to A minimally including gripping bed rail once positioned. Dependent on ceiling lift transfer to chair. Increased time spent on positioning, including pillow placement for joint integrity, to achieve comfort positioning. Rec ARU.

## 2017-01-20 NOTE — PLAN OF CARE
Problem: Goal Outcome Summary  Goal: Goal Outcome Summary  PT: Patient receiving plasmaphoresis at scheduled PT evaluation. Will attempt in PM as able.

## 2017-01-20 NOTE — PROGRESS NOTES
Patient was able to achieve a NIF -80 and VC 2200 ml with good effort.  Will cont to follow.    Murray Tenorio RT

## 2017-01-20 NOTE — PROGRESS NOTES
"[facility] Progress Note          Assessment and Plan:   71 year old male with metastatic intrahepatic cholangiocarcinoma presenting with sudden onset bilateral upper and lower extremity weakness.    1. Metastatic intrahepatic cholangiocarcinoma with pulmonary, hepatic, and skeletal metastases  - Patient had recent scans performed at [facility] that demonstrated disease progression after 7 cycles of 1st line therapy with gemcitabine and cisplatin.  Plan was to start 2nd line therapy with FOLFOX next week -- all chemo will be placed on hold until clinical improvement in his GBS/weakness.  Can consider a port placement prior to eventual hospital discharge.      2. Generalized weakness, sudden onset  -  Appreciate Neurology evaluation and recommendations. Discussed with Dr. Barriga and Dr. Lopez. Clinical presentation appears consistent with Guillain [place] Syndrome.  Appreciate Nephrology assistance -- Received PLEX on 2017 and 2017.    3. Anemia related to recent chemotherapy and cancer  - Hgb remains stable.  Continue serial CBC monitoring.    4. Insomnia  - Continue mirtazipine to help with sleep as well as appetite and mood.    Full code               Interval History:   Denied new complaints.  Denied fevers.  Pain well controlled              Review of Systems:   As per subjective, otherwise 5 systems reviewed and negative.           Physical Exam:   Blood pressure 142/98, pulse 108, temperature 97.2  F (36.2  C), temperature source Oral, resp. rate 9, height 1.778 m (5' 10\"), weight 77.3 kg (170 lb 6.7 oz), SpO2 97 %.      Vital Sign Ranges  Temperature Temp  Av.8  F (36.6  C)  Min: 97.4  F (36.3  C)  Max: 98.1  F (36.7  C)   Blood pressure Systolic (24hrs), Av mmHg, Min:119 mmHg, Max:147 mmHg       Diastolic (24hrs), Av mmHg, Min:85 mmHg, Max:105 mmHg      Pulse No Data Recorded   Respirations Resp  Av.8  Min: 9  Max: 25   Pulse oximetry SpO2  Av.9 %  Min: 90 " %  Max: 99 %         Intake/Output Summary (Last 24 hours) at 01/19/17 0940  Last data filed at 01/19/17 0511   Gross per 24 hour   Intake    795 ml   Output   1150 ml   Net   -355 ml       Constitutional:   No acute distress.   Extremities:   No clubbing, cyanosis, or edema.   Neurological:   No change in strength in the upper and lower extremities.            Medications:     No current outpatient prescriptions on file.                Data:     Results for orders placed or performed during the hospital encounter of 01/17/17 (from the past 24 hour(s))   Basic metabolic panel   Result Value Ref Range    Sodium 139 133 - 144 mmol/L    Potassium 4.6 3.4 - 5.3 mmol/L    Chloride 109 94 - 109 mmol/L    Carbon Dioxide 20 20 - 32 mmol/L    Anion Gap 10 3 - 14 mmol/L    Glucose 104 (H) 70 - 99 mg/dL    Urea Nitrogen 23 7 - 30 mg/dL    Creatinine 1.13 0.66 - 1.25 mg/dL    GFR Estimate 64 >60 mL/min/1.7m2    GFR Estimate If Black 78 >60 mL/min/1.7m2    Calcium 8.2 (L) 8.5 - 10.1 mg/dL   CBC with platelets   Result Value Ref Range    WBC 5.9 4.0 - 11.0 10e9/L    RBC Count 3.16 (L) 4.4 - 5.9 10e12/L    Hemoglobin 11.0 (L) 13.3 - 17.7 g/dL    Hematocrit 32.9 (L) 40.0 - 53.0 %     (H) 78 - 100 fl    MCH 34.8 (H) 26.5 - 33.0 pg    MCHC 33.4 31.5 - 36.5 g/dL    RDW 16.7 (H) 10.0 - 15.0 %    Platelet Count 112 (L) 150 - 450 10e9/L   Magnesium   Result Value Ref Range    Magnesium 1.4 (L) 1.6 - 2.3 mg/dL   Phosphorus   Result Value Ref Range    Phosphorus 2.8 2.5 - 4.5 mg/dL

## 2017-01-20 NOTE — PROGRESS NOTES
01/20/17 1000   Quick Adds   Type of Visit Initial Occupational Therapy Evaluation   Living Environment   Lives With alone   Living Arrangements house   Living Environment Comment plans to discharge to  rehab   Self-Care   Equipment Currently Used at Home none   Functional Level Prior   Ambulation 0-->independent   Transferring 0-->independent   Toileting 0-->independent   Bathing 0-->independent   Dressing 0-->independent   Eating 0-->independent   Communication 0-->understands/communicates without difficulty   Swallowing 0-->swallows foods/liquids without difficulty   Cognition 0 - no cognition issues reported   Fall history within last six months no   Prior Functional Level Comment I at baseline       Present no   General Information   Onset of Illness/Injury or Date of Surgery - Date 01/17/17   Referring Physician Enrique Ahn MD   Patient/Family Goals Statement get stronger   Additional Occupational Profile Info/Pertinent History of Current Problem 71 year old male with metastatic intrahepatic cholangiocarcinoma presenting with sudden onset bilateral upper and lower extremity weakness. 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    Precautions/Limitations fall precautions   Cognitive Status Examination   Orientation orientation to person, place and time   Level of Consciousness alert   Able to Follow Commands WNL/WFL   Visual Perception   Visual Perception Wears glasses   Sensory Examination   Sensory Comments Intact to light touch through UE and LE   Pain Assessment   Patient Currently in Pain Yes, see Vital Sign flowsheet  (R shoulder)   Strength   Strength Comments demonstrates significant diffuse weakness; able to wiggle fingers (soft  with L hand), 2/5 wrist, 1/5 elbow and 3/5 shoulder shrug.   Mobility   Bed Mobility Comments MAX A of 2   Transfer Skills   Transfer Comments Dependent on ceiling lift   Balance   Balance Comments unable to hold  "self up in sitting or standing   Upper Body Dressing   Level of [place]: Dress Upper Body dependent (less than 25% patients effort)   Lower Body Dressing   Level of [place]: Dress Lower Body dependent (less than 25% patients effort)   Toileting   Level of [place]: Toilet dependent (less than 25% patients effort)   Grooming   Level of [place]: Grooming dependent (less than 25% patients effort)   Eating/Self Feeding   Level of [place]: Eating dependent (less than 25% patients effort)   Activities of Daily Living Analysis   Impairments Contributing to Impaired Activities of Daily Living balance impaired;pain;strength decreased;postural control impaired   General Therapy Interventions   Planned Therapy Interventions ADL retraining;balance training;ROM;strengthening   Clinical Impression   Criteria for Skilled Therapeutic Interventions Met yes, treatment indicated   OT Diagnosis Decreased ADL I   Influenced by the following impairments decreased balance, weakness   Assessment of Occupational Performance 5 or more Performance Deficits   Identified Performance Deficits dressing, toileting, bathing, mobility, hygiene, home mgmt   Clinical Decision Making (Complexity) High complexity   Therapy Frequency daily   Predicted Duration of Therapy Intervention (days/wks) 5 days   Anticipated Discharge Disposition Acute Rehabilitation Facility   Risks and Benefits of Treatment have been explained. Yes   Patient, Family & other staff in agreement with plan of care Yes   [facility] TM \"6 Clicks\"   2016, Trustees of [facility], under license to [employer].  All rights reserved.   6 Clicks Short Forms Daily Activity Inpatient Short Form   [facility]  \"6 Clicks\" Daily Activity Inpatient Short Form   1. Putting on and taking off regular lower body clothing? 1 - Total   2. Bathing (including washing, rinsing, drying)? 1 - Total   3. Toileting, which includes using toilet, bedpan " or urinal? 1 - Total   4. Putting on and taking off regular upper body clothing? 1 - Total   5. Taking care of personal grooming such as brushing teeth? 1 - Total   6. Eating meals? 1 - Total   Daily Activity Raw Score (Score out of 24.Lower scores equate to lower levels of function) 6   Total Evaluation Time   Total Evaluation Time (Minutes) 15

## 2017-01-20 NOTE — PROGRESS NOTES
[facility]  Neuroscience and Spine [place]  Neurology Daily Note      Admission Date:1/17/2017   Date of service: 01/20/2017   Hospital Day: 4                                                   Assessment and Plan:   #. 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  His situation is complex as he has other medical condition, metastatic cholangiocarcinoma raising high suspicion that this is paraneoplastic syndrome.  CSF protein is high which can sometimes be observed in leptomeningeal involvement of cancers and sometimes cytology is not always sensitive.  --Continue PLEX  Due to his profound weakness, he will be at risk of respiratory, swallowing issues, autonomic instability.  He will need close clinical monitoring and transferred to ICU if appropriate.  I discussed the rather guarded prognosis regarding his condition and the possibility of need for intubation. He is quite sure that he would want to be aggressive with intubation and ventilation.  Plan to review EMG results-later result c/w AIDP.  Continue respiratory monitoring-currently stable  Continue supportive measures.  #Small infarct Right frontal.  Assumed to be related to hypercoagulable state related to underlying cancer  Per Dr. Radha Barriga plan, consider pics abandoned once he completesPLEX  #cervical canal stenosis  Note neurosurgical recommendations-agree that presentation is more consistent with peripheral nerve process.  Plans for surgery were deferred  #. PT/OT/Speech  --continue evaluations  #.Nutrition  --Per speech therapy evaluation    Dr. Glez/Radha Barriga to follow for the weekend       Interval History:   Chart reviewed-very complicated situation.  Dramatic progression of weakness over the last week.  Sensory loss.  Denies pain.  Denies any swallowing difficulty.  Denies shortness of breath.         Review of Systems:   Denies any change in urine or bowel control.  Denies visual difficulty.        Medications:   Scheduled Meds:    enoxaparin  40 mg Subcutaneous Q24H     mirtazapine  15 mg Oral At Bedtime     sodium chloride (PF)  3 mL Intracatheter Q8H     docusate sodium  100 mg Oral Daily     multivitamin, therapeutic  1 tablet Oral Daily     sennosides  1 tablet Oral BID     cholecalciferol  400 Units Oral Daily     PRN Meds: sodium chloride (PF), heparin, sodium chloride 0.9%, - MEDICATION INSTRUCTIONS -, labetalol, metoprolol, naloxone, lidocaine, lidocaine 4%, sodium chloride (PF), potassium chloride SA, potassium chloride, potassium chloride, potassium chloride with lidocaine, potassium chloride, magnesium sulfate, acetaminophen, acetaminophen, HYDROcodone-acetaminophen, HYDROmorphone, senna-docusate, polyethylene glycol, bisacodyl, ondansetron **OR** ondansetron, sodium chloride (PF), labetalol, hydrALAZINE        Physical Exam:   Vitals: Temp: 97.7  F (36.5  C) Temp src: Oral BP: 123/87 mmHg Pulse: 87 Heart Rate: 112 Resp: 11 SpO2: 96 % O2 Device: None (Room air)    Vital Signs with Ranges: Temp:  [97.3  F (36.3  C)-98.2  F (36.8  C)] 97.7  F (36.5  C)  Pulse:  [83-98] 87  Heart Rate:  [] 112  Resp:  [10-19] 11  BP: ()/() 123/87 mmHg  SpO2:  [95 %-99 %] 96 %    General Appearance:  No acute distress  Neuro:       Mental Status Exam:   Alert and oriented.  Language and speech intact.       Cranial Nerves:   Vision/visual fields intact to finger counting.  Pupils are equal and reactive to light, extraocular movements intact.  Facial strength might be slightly weak.  Minimal facial asymmetry.  No masseter or tongue deviation weak shoulder elevation.  Palate elevation intact.           Motor:   Flaccid tone in all four extremities.  No  or movement of the right arm, minimal  on the left side.  No ability to lift his legs only slight internal/external rotation of the legs.  Some  atrophy           Reflexes:  Areflexic, toe signs neutral       Sensory:  No pinprick sensation below the shoulder level.  Vibration sense is intact at the shoulder, not present at the elbow hands knees or feet.                   Coordination:   Consistent with profound weakness       Gait:  Unable    Cardiovascular: Regular rate and rhythm, no m/r/g    Extremities: No clubbing, no cyanosis, no edema       Data:   ROUTINE IP LABS (Last 3results)  CBC RESULTS:     Recent Labs   Lab Test  01/20/17   0540  01/19/17   0715  01/18/17   0510   WBC  5.9  5.6  4.3   RBC  3.16*  2.91*  3.06*   HGB  11.0*  10.0*  10.7*   HCT  32.9*  30.3*  31.6*   PLT  112*  135*  155     Basic Metabolic Panel:  Recent Labs   Lab Test  01/20/17   0540  01/19/17   0715  01/18/17   0510   NA  139  138  137   POTASSIUM  4.6  3.9  4.6   CHLORIDE  109  105  103   CO2  20  25  25   BUN  23  25  21   CR  1.13  1.15  1.16   GLC  104*  112*  185*   SERINA  8.2*  8.6  9.1     Liver panel:  Recent Labs   Lab Test  01/19/17   0715  01/18/17   0510  01/17/17   1228   PROTTOTAL  6.7*  7.3  7.2   ALBUMIN  2.9*  3.1*  3.2*   BILITOTAL  0.5  0.8  0.8   ALKPHOS  237*  243*  242*   AST  84*  79*  51*   ALT  45  33  28     INR:  Recent Labs   Lab Test  01/19/17   0715  01/17/17   1228   INR  1.15*  1.11      Lipid Profile:  Recent Labs   Lab Test  01/18/17   0510   CHOL  197   HDL  54   LDL  117*   TRIG  131     Thyroid Panel:  Recent Labs   Lab Test  01/18/17   0510   TSH  0.81      Vitamin B12:   Recent Labs   Lab Test  01/18/17   1858   B12  >6000*      Vitamin D level:   Recent Labs   Lab Test  01/18/17   0510   VITDT  82*     A1C:   Recent Labs   Lab Test  01/18/17   0510   A1C  4.8     Troponin I:   Recent Labs   Lab Test  01/18/17   0510   TROPI  0.070*     CSF results reviewed     IMAGING:   All imaging studies were reviewed personally  MR BRAIN W/O CONTRAST 1/17/2017 3:04 PM       HISTORY: bilateral UE/LE weakness     TECHNIQUE: Multiplanar, multisequence  MRI of the brain without IV  contrast material. No contrast was administered because of the length  of the examination. The patient was becoming uncomfortable.     COMPARISON: None.     FINDINGS: Motion artifact degrades quality of these images.  There is  generalized atrophy of the brain. . There are small areas of  restricted diffusion in the cortex of the right frontal lobe. These  measure about 4 mm in size. These would be compatible with recent  cortical infarcts. No lesions are seen in the cerebellum or left  hemisphere.. . The facial structures appear normal. The arteries at  the base of the brain and the dural venous sinuses appear patent. No  brain metastasis are identified. No mass lesions are seen.                                                                       IMPRESSION:    1. Small areas of restricted diffusion in the cortex of the right  frontal lobe. These are consistent with small recent cortical  infarcts.  2. No brain metastasis are identified. No mass lesions are seen.  3. Generalized atrophy of the brain. .  4. No skull metastasis are identified    MR CERVICAL SPINE WITH CONTRAST 1/17/2017 9:39 PM       HISTORY: Metastatic disease. Evaluation needed with contrast for  treatment purposes.     TECHNIQUE: Postcontrast images were obtained through the cervical  spine with 7 mL of Gadavist.     COMPARISON: MR cervical spine without contrast on same date.     FINDINGS: Postcontrast images show enhancement of the bone lesions in  the C7 and T2 vertebral bodies consistent with osseous metastases. In  addition, there is a small amount of enhancing epidural tumor at the  T2 level in the right anterolateral epidural space causing some mild  central canal stenosis but no cord deformity. There is also enhancing  soft tissue in the right C6-C7 and C7-T1 neural foramen as well as the  right T2-T3 neural foramen consistent with some tumor. There is also a  small amount of epidural tumor in the right  anterior epidural space at  C7. There is no evidence for any intradural tumor any intramedullary  tumor. Degenerative changes are also superimposed most advanced at  C6-C7 where there is moderate central canal and bilateral neural  foraminal stenosis along with mild cord deformity. This is just above  the small amount of epidural tumor at C7.                                                                       IMPRESSION: Osseous metastases at C7 and T1 with some epidural tumor  in the right anterior lateral epidural spaces at these levels as well  as some epidural tumor in the right side of neural foramen at C6-C7,  C7-T1, and T2-T3. The epidural tumor is not causing any cord  impingement at this time. There is moderate facet degenerative central  canal stenosis at C6-C7 with some mild cord deformity    MR LUMBAR SPINE WITHOUT CONTRAST1/17/2017 2:59 PM        HISTORY: History of cancer, increasing weakness.     TECHNIQUE:  Multiplanar, multisequence MRI of the lumbar spine without  contrast.       COMPARISON: None.     FINDINGS:This report assumes five lumbar type vertebrae.       The conus and visualized portions of the thoracic spinal cord appear  normal. The paraspinal soft tissues appear normal as visualized. The  bone marrow has normal signal intensity. No bone metastasis are seen  in the lumbar region. No central spinal stenosis or cord compression.     L1-L2:   Normal disc, facet joints, spinal canal and neural foramina.     L2-L3:  Normal disc, facet joints, spinal canal and neural foramina.     L3-L4:  Degeneration of the disc. Diffuse annular disc bulge. There is  a small more right central disc herniation causing mild mass effect on  the dural sac. Central canal is mildly narrowed due to this right  central disc herniation. This herniation extends slightly inferior to  the level of the disc and posterior to the L4 vertebral body.     L4-L5:  Degeneration of the disks. Loss of disc space height.  Diffuse  annular disc bulge. There is disc material extending into the right  and left neural foramen causing moderate bilateral foraminal stenosis.  Mild central stenosis due to the bulging disc and degenerative change  off the facet joints.     L5-S1:  Normal disc, facet joints, spinal canal and neural foramina.                                                                       IMPRESSION:    1. No bone metastasis are seen in the lumbar region.  2. Multilevel degenerative change.  3. Moderate size right central disc herniation at L3-L4 causing mild  mass effect on the dural sac. This could affect the right L5 nerve  root as it arises from the sac. It is also causing mild central  stenosis.  4. Moderate bilateral L4-5 foraminal stenosis due to a bulging disc  and degenerative change off the facet joints. There is also moderate  central stenosis and lateral recess stenosis at this level due to  degenerative change off the facet joints and a bulging disc    MR LUMBAR SPINE WITHOUT CONTRAST1/17/2017 2:59 PM        HISTORY: History of cancer, increasing weakness.     TECHNIQUE:  Multiplanar, multisequence MRI of the lumbar spine without  contrast.       COMPARISON: None.     FINDINGS:This report assumes five lumbar type vertebrae.       The conus and visualized portions of the thoracic spinal cord appear  normal. The paraspinal soft tissues appear normal as visualized. The  bone marrow has normal signal intensity. No bone metastasis are seen  in the lumbar region. No central spinal stenosis or cord compression.     L1-L2:   Normal disc, facet joints, spinal canal and neural foramina.     L2-L3:  Normal disc, facet joints, spinal canal and neural foramina.     L3-L4:  Degeneration of the disc. Diffuse annular disc bulge. There is  a small more right central disc herniation causing mild mass effect on  the dural sac. Central canal is mildly narrowed due to this right  central disc herniation. This herniation extends  slightly inferior to  the level of the disc and posterior to the L4 vertebral body.     L4-L5:  Degeneration of the disks. Loss of disc space height. Diffuse  annular disc bulge. There is disc material extending into the right  and left neural foramen causing moderate bilateral foraminal stenosis.  Mild central stenosis due to the bulging disc and degenerative change  off the facet joints.     L5-S1:  Normal disc, facet joints, spinal canal and neural foramina.                                                                       IMPRESSION:    1. No bone metastasis are seen in the lumbar region.  2. Multilevel degenerative change.  3. Moderate size right central disc herniation at L3-L4 causing mild  mass effect on the dural sac. This could affect the right L5 nerve  root as it arises from the sac. It is also causing mild central  stenosis.  4. Moderate bilateral L4-5 foraminal stenosis due to a bulging disc  and degenerative change off the facet joints. There is also moderate  central stenosis and lateral recess stenosis at this level due to  degenerative change off the facet joints and a bulging disc    CT ANGIOGRAM OF THE HEAD AND NECK WITHOUT AND WITH CONTRAST  1/18/2017  6:08 PM       HISTORY: Admitted yesterday for generalized weakness. Evaluate for  stroke. Small infarcts in the cortex of the right frontal lobe on  yesterday's MRI. Known osseous metastases and C7-T1 epidural tumor.     TECHNIQUE:  Precontrast localizing scans were followed by CT  angiography with an injection of 70 mL [id number]-[id number] IV with scans  through the head and neck.  Images were transferred to a separate 3-D  workstation where multiplanar reformations and 3-D images were  created.  Estimates of carotid stenoses are made relative to the  distal internal carotid artery diameters except as noted. Radiation  dose for this scan was reduced using automated exposure control,  adjustment of the mA and/or kV according to patient size, or  iterative  reconstruction technique.     COMPARISON: MRI yesterday.     CT HEAD FINDINGS:  No contrast enhancing lesions.  Cerebral blood flow  is grossly normal.     CT ANGIOGRAM HEAD FINDINGS:  Arteries are widely patent with no  aneurysm, significant stenosis, occlusion or intraarterial thrombus.  Venous circulation is unremarkable.     CT ANGIOGRAM NECK FINDINGS:    Right carotid artery: No significant stenosis.        Left carotid artery: Minimal calcified plaque.  No significant  stenosis.        Vertebral arteries: No significant stenosis.        Other findings: There is a dominant poorly circumscribed nodule in the  right upper lobe about 2 cm diameter. There are also numerous much  smaller peripheral nodules in the upper lobes. The appearance is  suggestive of metastatic disease. Destructive lesion is seen in the C7  vertebral body.                                                                       IMPRESSION:    1. Minimal plaque in the proximal left internal carotid artery.  2. Otherwise normal CT angiogram of the head and neck.  3. Bone and lung metastases

## 2017-01-20 NOTE — PROGRESS NOTES
Stable 2nd plasma exchange today.  150% plasma volume treatment.  Replaced with Albumin 5%.    Michael Lopez MD  Nephrology; [employer]  [phone]

## 2017-01-20 NOTE — PROGRESS NOTES
[facility]    Hospitalist Progress Note    Date of Service (when I saw the patient): 01/20/2017    Assessment and Plan  Rick Teixeira is a 71 year old male who was admitted on 1/17/2017.  Past medical history including metastatic cholangiocarcinoma presenting with acute lower extremity weakness.        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  MRI brain with recent infarcts, however, location would not explain his diffuse weakness.  MRI spine (cervical, thoracic and lumbar) with metastatic lesions at C7, T2 and T12 but negative for cord impingement.  Underwent LP 1/18 with finding of elevated protein level with normal WBC.  Per Neuro, EMG 1/18 also abnormal concerning for Guillain-[place]o.  Nephrology consulted, tunneled cath placed 1/19 by IR.  - initiating plasma exchange 1/19 (plan for 5-7 treatments)   - Neph and Neuro assistance much appreciated    Metastatic cholangiocarcinoma:  Follows with [facility].  MRI demonstrating metastatic disease as noted.  Radiation Oncology consulted, radiation treatment to be determined.  MOHPA consulted, was to start new FOLFOX regimen next week but further chemo on hold until functional status improved.  - may consider port placement prior to discharge (would pursue before initiating anticoagulation)  - Onc and Rad Onc recs appreciated    Small recent cortical brain infarcts: MRI of the brain showed small areas of restricted diffusion in the cortex of the right frontal lobe.  CT angio 1/18 without occlusion or stenosis.  No brain metastases identified.  Likely embolic in setting of A-fib.  TTE with EF 50-55%, negative bubble.  TSH, A1C wnl.  LDL elevated at 117.  - Neuro recommends anticoagulation with apixaban once plasma exchange complete, will also need statin  - permissive HTN  - SLP recommends regular diet    Paroxysmal A-fib:  Family reports history of A-fib diagnosed at prior  hospitalization.  Decision was made against anticoagulation due to limited life expectancy despite hypercoagulable state of malignancy.  - continue tele, currently rate controlled without kelsie blocking agents  - metoprolol IV prn  - ultimately start apixaban once pheresis complete    Hypertension: Holding PTA atenolol and amlodipine for permissive HTN.    - resume BB and CCB as indicated  - lisinopril needs to be held until plasmapheresis complete    Hypokalemia: Patient with low potassium and magnesium on admission.  - electrolyte protocol    Anemia, macrocytic, thrombocytopenia, pancytopenia:  Presume secondary to chemotherapy.  MCV is 105.  White blood cell count and platelet count are also reduced.  Hemoglobin is 10.0 on admission.  - cell counts stable, monitor  Right posterior Shoulder pain: try lidocain patch    DVT Prophylaxis: Pneumatic Compression Devices, start lovenox  Code Status: Full Code    Disposition: Expected discharge pending clinical improvement, completion of pheresis.    Chico Brown    Interval History  Patient seen and assessed. Report feeling slightly better. Able to move his finger and toes. He report pain of the right shoulder.    -Data reviewed today: I reviewed all new labs and imaging results over the last 24 hours. I personally reviewed no images or EKG's today.    Physical Exam  Temp: 97.7  F (36.5  C) Temp src: Oral BP: 123/87 mmHg Pulse: 87 Heart Rate: 112 Resp: 11 SpO2: 96 % O2 Device: None (Room air)    Filed Vitals:    01/18/17 0430 01/19/17 0645 01/19/17 0926   Weight: 76.1 kg (167 lb 12.3 oz) 77.3 kg (170 lb 6.7 oz) 77.3 kg (170 lb 6.7 oz)     Vital Signs with Ranges  Temp:  [97.3  F (36.3  C)-98.2  F (36.8  C)] 97.7  F (36.5  C)  Pulse:  [84-98] 87  Heart Rate:  [] 112  Resp:  [10-19] 11  BP: ()/(64-95) 123/87 mmHg  SpO2:  [95 %-99 %] 96 %  I/O last 3 completed shifts:  In: 900 [P.O.:900]  Out: 575 [Urine:575]    Constitutional: Well developed, well  nourished male in no acute distress  Respiratory: Anterior lung sounds clear to auscultation bilaterally, no crackles or wheezes  Cardiovascular: irregularly irregular rhythm, normal rate, S1/S2 without murmur, rubs or gallops  GI: Abdomen soft, non-tender, non-distended, normal bowel sounds  Skin/Integumen:   Other:  alert and appropriate, ?dysarthria, cranial nerves grossly intact      Medications    - MEDICATION INSTRUCTIONS -         enoxaparin  40 mg Subcutaneous Q24H     mirtazapine  15 mg Oral At Bedtime     sodium chloride (PF)  3 mL Intracatheter Q8H     docusate sodium  100 mg Oral Daily     multivitamin, therapeutic  1 tablet Oral Daily     sennosides  1 tablet Oral BID     cholecalciferol  400 Units Oral Daily       Data    Recent Labs  Lab 01/20/17  0540 01/19/17  0715 01/18/17  0510 01/17/17  1228   WBC 5.9 5.6 4.3 3.7*   HGB 11.0* 10.0* 10.7* 10.0*   * 104* 103* 105*   * 135* 155 144*   INR  --  1.15*  --  1.11    138 137 137   POTASSIUM 4.6 3.9 4.6 3.3*   CHLORIDE 109 105 103 102   CO2 20 25 25 30   BUN 23 25 21 16   CR 1.13 1.15 1.16 1.17   ANIONGAP 10 8 9 5   SERINA 8.2* 8.6 9.1 9.5   * 112* 185* 99   ALBUMIN  --  2.9* 3.1* 3.2*   PROTTOTAL  --  6.7* 7.3 7.2   BILITOTAL  --  0.5 0.8 0.8   ALKPHOS  --  237* 243* 242*   ALT  --  45 33 28   AST  --  84* 79* 51*   TROPI  --   --  0.070*  --        No results found for this or any previous visit (from the past 24 hour(s)).

## 2017-01-20 NOTE — PROGRESS NOTES
Nephrology Progress Note          Assessment and Plan:   Guillain-[place] syndrome, sl better neurologic findings.  2nd plasma exchange later today.  Chemistries ok except lower Mg++ and Ca++.  Will give IV MgSO4 infusion today, and begin daily oral supplement.  Extra Ca++ will be given today with PLEX.  BP trending better; no need to restart anti-hypertensive Rx yet.  Expect next exchange on 1/22.      Weakness    Generalized muscle weakness    Disturbance of skin sensation    Metastatic cholangiocarcinoma to bone (H)    * No resolved hospital problems. *               Interval History:   no new complaints, alert, oriented to person, place and time and doing well; no cp, sob, n/v/d, or abd pain.  Improved movement in left hand overnight.  VS ok, except still sl tachycardic.  BP ~120/90.  No resp compromise; good rm air SaO2.  I&O ok.  Meds and labs reviewed.  Lytes nl, though CO2 down to 20.  Mg++ 1.4 and Ca++ 8.2.  CBC stable except lower plts, 112K.                Medications:       anticoagulant citrate  500-2,000 mL Apheresis Once     albumin human  5,000 mL Apheresis Once     calcium gluconate intermittent infusion with additives - doses under 5g  6 g Intravenous Once     magnesium oxide  400 mg Oral Daily     enoxaparin  40 mg Subcutaneous Q24H     mirtazapine  15 mg Oral At Bedtime     sodium chloride (PF)  3 mL Intracatheter Q8H     docusate sodium  100 mg Oral Daily     multivitamin, therapeutic  1 tablet Oral Daily     sennosides  1 tablet Oral BID     cholecalciferol  400 Units Oral Daily       - MEDICATION INSTRUCTIONS -                      Physical Exam:       Vital Sign Ranges  Temp:  [97.3  F (36.3  C)-98.2  F (36.8  C)] 97.7  F (36.5  C)  Pulse:  [84-98] 87  Heart Rate:  [] 112  Resp:  [10-19] 11  BP: ()/(64-95) 123/87 mmHg  SpO2:  [95 %-99 %] 96 %    Weight, current:  77.3 kg (actual weight)  Weight change: 0 kg (0 lb)    I/O last 3 completed shifts:  In: 900 [P.O.:900]  Out: 575  [Urine:575]    Physical Exam:   General:  Patient comfortable, in no apparent distress.  Awake, alert, oriented x3.  Neck:  Supple, no JVD.  Lungs:  Clear to auscultation bilaterally.  Cardiac:  Regular rate and rhythm, no murmurs, rub, or gallops.  Abdomen:  Soft, nontender, physiologic sounds.  Extremities:  Without edema.  2+ pulses.  Skin:  Warm, dry.  Neurologic:  Same except left hand improvement..             Data:        Lab Results   Component Value Date     01/20/2017    Lab Results   Component Value Date    CHLORIDE 109 01/20/2017    Lab Results   Component Value Date    BUN 23 01/20/2017      Lab Results   Component Value Date    POTASSIUM 4.6 01/20/2017    Lab Results   Component Value Date    CO2 20 01/20/2017    Lab Results   Component Value Date    CR 1.13 01/20/2017        Lab Results   Component Value Date     01/20/2017     01/19/2017     01/18/2017     Lab Results   Component Value Date    POTASSIUM 4.6 01/20/2017    POTASSIUM 3.9 01/19/2017    POTASSIUM 4.6 01/18/2017     Lab Results   Component Value Date    CHLORIDE 109 01/20/2017    CHLORIDE 105 01/19/2017    CHLORIDE 103 01/18/2017     Lab Results   Component Value Date    CO2 20 01/20/2017    CO2 25 01/19/2017    CO2 25 01/18/2017     Lab Results   Component Value Date    CR 1.13 01/20/2017    CR 1.15 01/19/2017    CR 1.16 01/18/2017     Lab Results   Component Value Date    BUN 23 01/20/2017    BUN 25 01/19/2017    BUN 21 01/18/2017     Lab Results   Component Value Date    HGB 11.0* 01/20/2017    HGB 10.0* 01/19/2017    HGB 10.7* 01/18/2017     No results found for: PH, PHARTERIAL, PO2, [id number], SAT, PCO2, HCO3, BASEEXCESS, RASHID, BEB          Michael Lopez MD  Nephrology; [employer]  [phone]

## 2017-01-20 NOTE — PLAN OF CARE
Problem: Goal Outcome Summary  Goal: Goal Outcome Summary  Outcome: No Change  A&O, VSS, RA. Tele A fib in 100's. Neuros intact, unable to move extremities, slight contraction of hands and feet. Pain managed with Norco and lidocaine patch to right shoulder. Weak, infrequent cough. Family at bedside. Plasmapheresis today, next planned for 1/22.

## 2017-01-20 NOTE — PROGRESS NOTES
Patient was able to achieve a NIF -80 and VC 2000 ml with good effort.  Will cont to follow.  1/19/2017  Amanda Keys RRT

## 2017-01-21 NOTE — PLAN OF CARE
"Problem: Goal Outcome Summary  Goal: Goal Outcome Summary  PT: Cancel. Attempted evaluation, pt wants to eat his breakfast. RN aware. Pt states \"All that going up in the air and flying around yesterday almost killed me.\" Educated pt on benefits of OOB, especially when eating. Pt declines, family present also states pt wanting to eat.        "

## 2017-01-21 NOTE — PROGRESS NOTES
Examination today with severe profound weakness all extremities. Nasal speech.Areflexic    Discussed with family- they have strong ties to [place] [facility] and have spoken to the neuro critical care people there who are willing to take in transfer. Given profound weakness and complication of metastatic cancer I think he would be well served in their care system.   Transfer in progress

## 2017-01-21 NOTE — PLAN OF CARE
Problem: Goal Outcome Summary  Goal: Goal Outcome Summary  Occupational Therapy Discharge Summary    Reason for therapy discharge:    Discharged to [facility]    Progress towards therapy goal(s). See goals on Care Plan in [facility] electronic health record for goal details.  Goals not met.  Barriers to achieving goals:   discharge from facility.    Therapy recommendation(s):    Continued therapy is recommended.  Rationale/Recommendations:  Rec. continued OT toprogress safety/indep. w/ ADl's, transfers. DC OT [facility]--pt. discharged to [place] this afternoon.  Pt. not able to be seen for afternoon session/discharged..

## 2017-01-21 NOTE — PLAN OF CARE
Problem: Goal Outcome Summary  Goal: Goal Outcome Summary  Outcome: No Change  A&Ox4, but forgetful at times. Sleeps between cares. 1/5 strength x4 extremities, BUE numbness fingertips to elbows, BLE numbness toes to knees. VSS. Scheduled atenolol restarted d/t tachycardia. Tele afib with CVR. Regular diet. Occasional cough. Pain to R shoulder decreased with Lidoderm patch. Plan to d/c to [place], time to be determined.

## 2017-01-21 NOTE — PLAN OF CARE
Problem: Goal Outcome Summary  Goal: Goal Outcome Summary  Outcome: No Change  Slept well between cares all night. Alert and oriented, forgetful at times. VSS. Neuros with weakness in all extremities, 1/5 x4. Bedrest, turned and repositioned q2h. VSS with the exception of tachycardia-PRN metoprolol given. Tele afib with rvr. Norco given for back pain. Plan for next plasmapheresis exchange on Sunday.

## 2017-01-21 NOTE — PLAN OF CARE
Problem: Goal Outcome Summary  Goal: Goal Outcome Summary  PT: PT orders received, chart reviewed. Note that PT eval was attempted this AM. Per discussion with CC, advised PT to discontinue PT orders at this time as patient with plans to transfer to [place] as soon as possible.

## 2017-01-21 NOTE — DISCHARGE SUMMARY
[facility]  Discharge Summary        Rick Teixeira MRN# [id number]   YOB: 1945 Age: 71 year old     Date of Admission:  1/17/2017  Date of Discharge:  1/21/2017  Admitting Physician:  Henry Pantoja DO  Discharge Physician: Chico Brown MD  Discharging Service: Hospitalist     Primary Provider: Dr Clinic, [facility]  Primary Care Physician Phone Number: [phone]         Discharge Diagnoses:      Bilateral arm weakness  Bilateral leg weakness  Bone metastasis (H)  A.fib        Problem Oriented Hospital Course (Providers):    Rick Teixeira was admitted on 1/17/2017 by Henry Pantoja DO and I would refer you to their history and physical.  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  MRI brain with recent infarcts, however, location would not explain his diffuse weakness.  MRI spine (cervical, thoracic and lumbar) with metastatic lesions at C7, T2 and T12 but negative for cord impingement.    --- Placed in IMC for close monitoring upon admission.  ---- No sign of respiratory involvement. Patient was able to achieve a NIF -80 and VC 1700 ml with good effort.  ----Underwent LP 1/18 with finding of elevated protein level (85) with 0 WBC.    ----Per Neuro, EMG 1/18 also abnormal concerning for Guillain-[place].    ----Nephrology consulted, tunneled cath placed 1/19 by IR.  --- Plasma exchange was started on 1/19 and 2nd was yesterday  --- Followed by Nephrology and neurology    Metastatic cholangiocarcinoma:  Follows with [facility].  MRI demonstrating metastatic disease as noted.  Radiation Oncology consulted, radiation treatment to be determined.  MOHPA consulted, was to start new FOLFOX regimen next week but further chemo on hold until functional status improved.    Small recent cortical brain infarcts:  MRI of the brain showed small areas of restricted diffusion in the cortex of the right frontal lobe.  CT angio 1/18 without occlusion or stenosis.  No brain metastases identified.  Likely embolic in setting of A-fib.  TTE with EF 50-55%, negative bubble. TSH, A1C wnl.  LDL elevated at 117.  - Neuro recommends anticoagulation with apixaban once plasma exchange complete, will also need statin  - Permissive HTN  - SLP evaluation was done and recommend regular diet    Paroxysmal A-fib:  Family reports history of A-fib diagnosed at prior hospitalization.  Decision was made against anticoagulation due to limited life expectancy despite hypercoagulable state of malignancy.  --- PTA atenolol was resumed  --- Metoprolol IV prn for HR >120    Hypertension: PTA atenolol and amlodipine was pm hold for permissive HTN.     - resume BB and CCB as indicated  - lisinopril needs to be held until plasmapheresis complete    Hypokalemia: Patient with low potassium and magnesium on admission.  - Electrolyte protocol    Anemia, macrocytic, thrombocytopenia, pancytopenia:  Presume secondary to chemotherapy.  MCV is 105.  White blood cell count and platelet count are also reduced.  Hemoglobin is 10.0 on admission.  - cell counts stable, monitor    Right posterior Shoulder pain: Lidocain patch    Patient and family wants to be transfer to [facility]. Call [facility] and discussed with accepting physician Dr Houser and patient will be sent in ambulance and direct admit to neuro ICU. Patient found to be stable prior to discharge.         Code Status:      Full Code        Brief Hospital Stay Summary Sent Home With Patient in AVS:       Rick Teixeira is a 71 year old male who was admitted on 1/17/2017.  Past medical history including metastatic cholangiocarcinoma presenting with acute lower extremity weakness and managed as described problem oriented hospital stay.        Important Results:      See below.         Pending Results:     "    Unresulted Labs Ordered in the Past 30 Days of this Admission     Date and Time Order Name Status Description    1/19/2017 1455 Paraneoplastic antibody In process             Discharge Instructions and Follow-Up:            Discharge Disposition:      Discharged to home        Discharge Medications:        Current Discharge Medication List      CONTINUE these medications which have NOT CHANGED    Details   AMLODIPINE BESYLATE PO Take 5 mg by mouth daily       LISINOPRIL PO Take 10 mg by mouth daily       ATENOLOL PO Take 25 mg by mouth daily      DOCUSATE SODIUM PO Take by mouth daily      sennosides (SENOKOT) 8.6 MG tablet Take 1 tablet by mouth 2 times daily      VITAMIN D, CHOLECALCIFEROL, PO Take 400 Units by mouth daily      multivitamin, therapeutic (THERA-VIT) TABS tablet Take 1 tablet by mouth daily      PROCHLORPERAZINE MALEATE PO Take 10 mg by mouth daily                Allergies:         Allergies   Allergen Reactions     Ancef [Cefazolin] Other (See Comments)     Chest heaviness     Penicillins Unknown           Consultations This Hospital Stay:      Consultation during this admission received from neurology, nephrology and oncology        Condition and Physical on Discharge:      Discharge condition: Stable   Vitals: Heart Rate: 107, Blood pressure 122/82, pulse 127, temperature 98.1  F (36.7  C), temperature source Axillary, resp. rate 16, height 1.778 m (5' 10\"), weight 77 kg (169 lb 12.1 oz), SpO2 96 %.     Constitutional: Awake, alert. No apparent distress   Lungs: Clear to auscultation bilaterally, no crackles or wheezing   Cardiovascular: Regular HR, normal S1 and S2, and no murmur noted   Abdomen: Normal bowel sounds, soft, non-distended, non-tender   Skin: No rashes, no cyanosis.   Neurology Motor:   Flaccid tone in all four extremities slightly improved since admisssion. move his fingers and toe. No  of the right arm, minimal  on the left side.  No ability to lift his legs only " slight internal/external rotation of the legs.  Some atrophy            Reflexes:  Areflexic, toe signs neutral       Sensory:  No pinprick sensation below the shoulder level.  Vibration sense is intact at the shoulder, not present at the elbow hands knees or feet.                                   Discharge Time:      Greater than 30 minutes.        Image Results From This Hospital Stay (For Non-EPIC Providers):        Results for orders placed or performed during the hospital encounter of 01/17/17   MR Cervical Spine w/o Contrast    Narrative    MR CERVICAL SPINE W/O CONTRAST   1/17/2017 3:03 PM     HISTORY: bilateral UE/LE weakness    TECHNIQUE:  Multiplanar multisequence MRI of the cervical spine  without gadolinium contrast.     COMPARISON:  None.    FINDINGS: Motion artifact degrades quality of these images.  Infiltrative lesions are seen within the C7 and T2 and T3 vertebral  bodies. This is consistent with metastatic disease to these vertebrae.  There is abnormal soft tissue extending from the T2 vertebral body  into the right ventral epidural space and right T2-T3 neural foramen.  This could affect the right T2 nerve root. There is moderate central  spinal stenosis at C6-7 due to bulging disc and associated  osteophytes. There is mild flattening of the cord at this level due to  the degenerative change.    C2-C3:  Normal disc, facet joints, spinal canal and neural foramina.    C3-C4:  Mild degenerative change in the facet joints.    C4-C5:  Mild annular bulge. Central canal still adequate. Moderate  foraminal stenosis due to uncinate spurs and loss of disc space  height.    C5-C6:  Loss of disc space height. Diffuse annular disc bulge. Central  canal mildly narrowed due to the bulging disc. Moderate bilateral  foraminal stenosis due to uncinate spurs.    C6-C7:  Degeneration of the disc. Diffuse annular disc bulge with  associated posterior osteophytes leading to moderate central stenosis  and mild  flattening of the cord. There is also moderate bilateral  foraminal stenosis at this level due to loss of disc space height and  uncinate spurs.    C7-T1: Degeneration of the disc. Mild annular disc bulge. Moderate  bilateral foraminal stenosis due to uncinate spurs.      Impression    IMPRESSION:    1. Study is consistent with bone metastasis to C7, T2, and T3. There  is a small amount of epidural tumor at the right T2 level which also  extends into the right T2-3 neural foramen. This could affect the  right T2 nerve root. No central stenosis is seen at this level.  2. Multilevel degenerative change. The degenerative changes are  leading to moderate central stenosis at C6-7 with mild flattening of  the cord at this level.  3. There is also moderate foraminal stenosis at multiple levels due to  loss of disc space height and uncinate spurs.    KEILY GARCIA MD   MR Thoracic Spine w/o Contrast    Narrative    MR THORACIC SPINE W/O CONTRAST 1/17/2017 3:04 PM     HISTORY: limited movement of UE/LE    TECHNIQUE: Multiplanar multisequence MRI of the thoracic spine without  gadolinium contrast.    COMPARISON: None.    FINDINGS: Inversion recovery images reveal infiltrative lesions within  the the C7, T2 and T12 vertebral bodies. There is also an infiltrative  process in the T9 lamina and spinous process. These are consistent  with metastatic disease. A very small amount of epidural tumor is seen  at the right T3 2 level causing only slight mass effect on the dural  sac. This is better seen on the MR scan of the cervical spine. There  are multilevel degenerative changes with loss of disc space height  throughout the thoracic spine. Mild bulging discs are seen at T4-5,  C5-6, and there is a small central disc protrusion at T6-7.      Impression    IMPRESSION:    1. Infiltrative lesions within the C7 and T2, and T12 vertebral bodies  and within the T9 lamina and spinous process. This is consistent with  metastatic disease.  2.  Small amount of epidural tumor at the T2 level but no cord  compression or central stenosis.  3. Multilevel degenerative change.    KEILY GARCIA MD   MR Brain w/o Contrast    Narrative    MR BRAIN W/O CONTRAST 1/17/2017 3:04 PM     HISTORY: bilateral UE/LE weakness    TECHNIQUE: Multiplanar, multisequence MRI of the brain without IV  contrast material. No contrast was administered because of the length  of the examination. The patient was becoming uncomfortable.    COMPARISON: None.    FINDINGS: Motion artifact degrades quality of these images.  There is  generalized atrophy of the brain. . There are small areas of  restricted diffusion in the cortex of the right frontal lobe. These  measure about 4 mm in size. These would be compatible with recent  cortical infarcts. No lesions are seen in the cerebellum or left  hemisphere.. . The facial structures appear normal. The arteries at  the base of the brain and the dural venous sinuses appear patent. No  brain metastasis are identified. No mass lesions are seen.      Impression    IMPRESSION:   1. Small areas of restricted diffusion in the cortex of the right  frontal lobe. These are consistent with small recent cortical  infarcts.  2. No brain metastasis are identified. No mass lesions are seen.  3. Generalized atrophy of the brain. .  4. No skull metastasis are identified.     KEILY GARCIA MD   MR Lumbar Spine w/o Contrast    Narrative    MR LUMBAR SPINE WITHOUT CONTRAST1/17/2017 2:59 PM      HISTORY: History of cancer, increasing weakness.    TECHNIQUE:  Multiplanar, multisequence MRI of the lumbar spine without  contrast.     COMPARISON: None.    FINDINGS:This report assumes five lumbar type vertebrae.     The conus and visualized portions of the thoracic spinal cord appear  normal. The paraspinal soft tissues appear normal as visualized. The  bone marrow has normal signal intensity. No bone metastasis are seen  in the lumbar region. No central spinal stenosis or cord  compression.    L1-L2:   Normal disc, facet joints, spinal canal and neural foramina.    L2-L3:  Normal disc, facet joints, spinal canal and neural foramina.    L3-L4:  Degeneration of the disc. Diffuse annular disc bulge. There is  a small more right central disc herniation causing mild mass effect on  the dural sac. Central canal is mildly narrowed due to this right  central disc herniation. This herniation extends slightly inferior to  the level of the disc and posterior to the L4 vertebral body.    L4-L5:  Degeneration of the disks. Loss of disc space height. Diffuse  annular disc bulge. There is disc material extending into the right  and left neural foramen causing moderate bilateral foraminal stenosis.  Mild central stenosis due to the bulging disc and degenerative change  off the facet joints.    L5-S1:  Normal disc, facet joints, spinal canal and neural foramina.      Impression    IMPRESSION:   1. No bone metastasis are seen in the lumbar region.  2. Multilevel degenerative change.  3. Moderate size right central disc herniation at L3-L4 causing mild  mass effect on the dural sac. This could affect the right L5 nerve  root as it arises from the sac. It is also causing mild central  stenosis.  4. Moderate bilateral L4-5 foraminal stenosis due to a bulging disc  and degenerative change off the facet joints. There is also moderate  central stenosis and lateral recess stenosis at this level due to  degenerative change off the facet joints and a bulging disc.    KEILY GARCIA MD   MR Cervical Spine w Contrast    Narrative    MR CERVICAL SPINE WITH CONTRAST 1/17/2017 9:39 PM     HISTORY: Metastatic disease. Evaluation needed with contrast for  treatment purposes.    TECHNIQUE: Postcontrast images were obtained through the cervical  spine with 7 mL of Gadavist.    COMPARISON: MR cervical spine without contrast on same date.    FINDINGS: Postcontrast images show enhancement of the bone lesions in  the C7 and T2  vertebral bodies consistent with osseous metastases. In  addition, there is a small amount of enhancing epidural tumor at the  T2 level in the right anterolateral epidural space causing some mild  central canal stenosis but no cord deformity. There is also enhancing  soft tissue in the right C6-C7 and C7-T1 neural foramen as well as the  right T2-T3 neural foramen consistent with some tumor. There is also a  small amount of epidural tumor in the right anterior epidural space at  C7. There is no evidence for any intradural tumor any intramedullary  tumor. Degenerative changes are also superimposed most advanced at  C6-C7 where there is moderate central canal and bilateral neural  foraminal stenosis along with mild cord deformity. This is just above  the small amount of epidural tumor at C7.      Impression    IMPRESSION: Osseous metastases at C7 and T1 with some epidural tumor  in the right anterior lateral epidural spaces at these levels as well  as some epidural tumor in the right side of neural foramen at C6-C7,  C7-T1, and T2-T3. The epidural tumor is not causing any cord  impingement at this time. There is moderate facet degenerative central  canal stenosis at C6-C7 with some mild cord deformity.    SO MALONE MD   XR Lumbar Puncture Spinal Tap Diag    Narrative    EXAM: XR LUMBAR PUNCTURE SPINAL TAP DIAGNOSTIC  1/18/2017 9:43 AM       History:  Extremity weakness, rule out Guillain [place].     PROCEDURE: The patient consented for a lumbar puncture. The benefits  and risks of the procedure were explained to the patient, including  but not limited to worsening headache, hemorrhage, infection, lower  extremity pain, or nerve root injury. The patient was sterilely  prepped and draped with the patient in the supine position, over the  lower back. Under fluoroscopic guidance, the interlaminar spaces were  noted. 1% lidocaine was administered for local anesthetic over the  L2-3 interlaminar space, and a 22 gauge  needle was advanced into the  thecal sac under fluoroscopic guidance. There was initial aspiration  of clear CSF. About 8 cc's total were aspirated.  The needle was  removed. There were no immediate complications associated with the  procedure.       Fluoro time: 0.2 minutes.   Number of Images: 2      Impression    IMPRESSION: Successful lumbar puncture.    JUAN NGUYEN PA-C   CT Head Neck Angio w/o & w Contrast    Narrative    CT ANGIOGRAM OF THE HEAD AND NECK WITHOUT AND WITH CONTRAST  1/18/2017  6:08 PM     HISTORY: Admitted yesterday for generalized weakness. Evaluate for  stroke. Small infarcts in the cortex of the right frontal lobe on  yesterday's MRI. Known osseous metastases and C7-T1 epidural tumor.    TECHNIQUE:  Precontrast localizing scans were followed by CT  angiography with an injection of 70 mL [id number]-370 IV with scans  through the head and neck.  Images were transferred to a separate 3-D  workstation where multiplanar reformations and 3-D images were  created.  Estimates of carotid stenoses are made relative to the  distal internal carotid artery diameters except as noted. Radiation  dose for this scan was reduced using automated exposure control,  adjustment of the mA and/or kV according to patient size, or iterative  reconstruction technique.    COMPARISON: MRI yesterday.    CT HEAD FINDINGS:  No contrast enhancing lesions.  Cerebral blood flow  is grossly normal.    CT ANGIOGRAM HEAD FINDINGS:  Arteries are widely patent with no  aneurysm, significant stenosis, occlusion or intraarterial thrombus.  Venous circulation is unremarkable.    CT ANGIOGRAM NECK FINDINGS:   Right carotid artery: No significant stenosis.      Left carotid artery: Minimal calcified plaque.  No significant  stenosis.      Vertebral arteries: No significant stenosis.      Other findings: There is a dominant poorly circumscribed nodule in the  right upper lobe about 2 cm diameter. There are also numerous much  smaller  peripheral nodules in the upper lobes. The appearance is  suggestive of metastatic disease. Destructive lesion is seen in the C7  vertebral body.      Impression    IMPRESSION:   1. Minimal plaque in the proximal left internal carotid artery.  2. Otherwise normal CT angiogram of the head and neck.  3. Bone and lung metastases.    CANELO CHOI MD   IR CVC Tunnel Placement > 5 Yrs of Age    Narrative    INTERVENTIONAL RADIOLOGY CVC TUNNEL PLACEMENT GREATER THAN FIVE YEARS  OF AGE  1/19/2017 8:31 AM     HISTORY:  71-year-old male with Guillain-[place] syndrome requiring  plasmapheresis.    FINDINGS: The procedure and risks were explained to the patient in  detail and informed consent was obtained. Maximal Sterile Barrier  Technique Utilized: Cap AND mask AND sterile gown AND sterile gloves  AND sterile full body drape AND hand hygiene AND skin preparation 2%  chlorhexidine for cutaneous antisepsis (or acceptable alternative  antiseptics).       Sterile Ultrasound Technique Utilized ?Sterile gel AND sterile probe  covers. The patient was prepped and draped, and 1% lidocaine was used  as local anesthetic. Ultrasound was used to document location and  patency of the right internal jugular vein, and a permanent image was  saved. Under sterile ultrasound guidance, a puncture was made into the  right internal jugular vein, and a 5 Stateless dilator was advanced over  a wire into the right atrium. A subcutaneous tunnel was then created  along the right anterior chest wall to the internal jugular access  site. A Bard NanoInk 14.5 Stateless 19 cm tip to cuff pheresis  catheter was then advanced through the tunnel. The dilator was then  exchanged for a peel-away sheath, and pheresis catheter was advanced  through the peel-away sheath into the right atrium. The peel-away  sheath was then removed. Both ports of the pheresis catheter were  aspirated and flushed and demonstrated good function and was  subsequently hep-locked. The  catheter was sutured to the skin site  with 2-0 Ethilon. SecureSeal adhesive was applied to the neck incision  site.    A permanent fluoroscopic image was obtained documenting the tip of the  pheresis catheter in the upper right atrium. There were no immediate  complications.    Fluoroscopy time: 0.1 minutes.    Total fluoroscopy dose: 1 mGy.    Local anesthetic: 17 mL 1% lidocaine.    Conscious sedation: 1 mg IV Versed, 25 mg IV fentanyl.     Sedation time: 20 minutes.    The patient was monitored by radiology nursing staff under my  supervision and remained stable throughout the study.      Impression    IMPRESSION: Successful right tunneled pheresis catheter placement.    MAGGY ROBISON MD           Most Recent Lab Results In EPIC (For Non-EPIC Providers):    Most Recent 3 CBC's:  Recent Labs   Lab Test  01/20/17   0540  01/19/17   0715  01/18/17   0510   WBC  5.9  5.6  4.3   HGB  11.0*  10.0*  10.7*   MCV  104*  104*  103*   PLT  112*  135*  155      Most Recent 3 BMP's:  Recent Labs   Lab Test  01/20/17   0540  01/19/17   0715  01/18/17   0510   NA  139  138  137   POTASSIUM  4.6  3.9  4.6   CHLORIDE  109  105  103   CO2  20  25  25   BUN  23  25  21   CR  1.13  1.15  1.16   ANIONGAP  10  8  9   SERINA  8.2*  8.6  9.1   GLC  104*  112*  185*     Most Recent 3 Troponin's:  Recent Labs   Lab Test  01/18/17   0510   TROPI  0.070*     Most Recent 3 INR's:  Recent Labs   Lab Test  01/19/17   0715  01/17/17   1228   INR  1.15*  1.11     Most Recent 2 LFT's:  Recent Labs   Lab Test  01/19/17   0715  01/18/17   0510   AST  84*  79*   ALT  45  33   ALKPHOS  237*  243*   BILITOTAL  0.5  0.8     Most Recent Cholesterol Panel:  Recent Labs   Lab Test  01/18/17   0510   CHOL  197   LDL  117*   HDL  54   TRIG  131     Most Recent 6 Bacteria Isolates From Any Culture (See EPIC Reports for Culture Details):No lab results found.  Most Recent TSH, T4 and HgbA1c:   Recent Labs   Lab Test  01/18/17   0510   TSH  0.81   A1C  4.8             Huber Brown MD  Internal medicine Hospitalist:   Pager [phone]    1/21/2017

## 2017-01-21 NOTE — PLAN OF CARE
Problem: Goal Outcome Summary  Goal: Goal Outcome Summary  Outcome: No Change  A&O x 4. BUE/BLE hemiplegia. Numbness on bilateral toes. BUE st. 1/5 and BLE st.1/5. HR luis/tachy. metroplol given for increase heart rate once. Tele Afib with RVR. regular diet. Total feed. Bedrest. Assist of 2 with lift. Turned and repositioned. Incontinent of urine, also uses urinal. Right shoulder pain decreased with norco. Magnesium was replaced due to level of 1.4 and it came up to 2.2. Recheck magnesium tomorow. Plan plasma exchange on Sunday morning.

## 2017-01-21 NOTE — PLAN OF CARE
Pt transferred to [facility] via bed & family @ bedside. am nurse given report to the floor nurse. All belongings with the pt.

## 2017-01-21 NOTE — PROVIDER NOTIFICATION
Page to Dr. Brown requesting SLP re consult d/t coughing after drinking.   No new orders per Dr. Brown.

## 2017-01-21 NOTE — PROGRESS NOTES
SW:  D: Pt is transferring to the [facility] today and requires transportation via ALS due to cardiac monitoring.  I: SW ordered transportation via [facility] at [facility] for a ALS transport to [facility] 8th floor room 729. 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 He is unable to remain seated in a chair. Pt requires cardiac monitoring. Pt is transfering to higher level of care given his history and treatment of his cancer complicated byGuillainBarre Syndrome. EMTALA completed by physician and signed and placed on chart for time of transport. PCS form completed, faxed and placed on pt chart for discharge time. Anticipate ALS transport for 3:15-3:30pm. CC asking about cost would be approximately $2738.00 (  is $936.00 and $21.84 per mile. Estimated mileage is 82.5 miles).   P: Pt discharge to [facility] today at approximately 3:30pm via [facility] ALS with cardiac monitoring.

## 2017-01-21 NOTE — PROGRESS NOTES
Spoke with SILKE Roper from [facility], initial assessment given.  [phone].  Dr. Lopez, Dr. Glez, and Dr. Palencia all in agreement for transfer to [facility].  Need MD to MD discussion, Dr Brown notified. [phone].  Awaiting for [facility] to accept pt.   Addendum:  Pt accepted at [facility].  Emtala/PCS forms completed. Family updated.

## 2017-01-21 NOTE — PROGRESS NOTES
Patient was able to achieve a NIF -80 and VC 1700 ml with good effort.  Will cont to follow.  1/20/2017  Amanda Keys RRT

## 2017-01-21 NOTE — PROGRESS NOTES
Note plan for transfer to [facility].  I will cancel plan for 1/22 PLEX.  Thanks.    Michael Lopez MD  Nephrology; [facility]  [phone]

## 2017-01-21 NOTE — DISCHARGE INSTRUCTIONS
Your risk factors for stroke or TIA (transient ischemic attack):    Your Risk Factors Your Results Normal Ranges   High blood pressure BP Readings from Last 1 Encounters:   01/21/17 122/82    Less than 120/80   Cholesterol              Total CHOL      197   1/18/2017   Less than 150    Triglycerides   TRIG      131   1/18/2017 Less than 150   LDL LDL      117   1/18/2017    Less than 70   HDL HDL       54   1/18/2017         Greater than 40 (men)  Greater than 50 (women)   Diabetes   Recent Labs  Lab 01/20/17  0540   *    Fasting blood glucose    Smoking/tobacco use  Quit smoking and tobacco   Overweight  Lose 1-2 pounds a week   Lack of exercise  30 minutes moderate activity each day   Other risk factors include carotid (neck) artery disease, atrial fibrillation and stress. You may be on new medicine to treat high blood pressure, cholesterol, diabetes or atrial fibrillation.    Understanding Stroke Booklet given to patient. Please refer to booklet for further information.    Stroke warning signs and symptoms - CALL 911 right away for:  - Sudden numbness or weakness in the face, arm or leg (often on one side of the body).  - Sudden confusion or trouble understanding what is going on.  - Sudden blurred or decreased vision in one or both eyes.  - Sudden trouble speaking, loss of balance, dizziness or problems with coordination.  - Sudden, severe headache for no reason.  - Fainting or seizures.  - Symptoms may go away then come back suddenly.    [facility]: [phone]  [facility]: [phone]  [facility]: [phone]

## 2017-01-21 NOTE — PLAN OF CARE
Problem: Goal Outcome Summary  Goal: Goal Outcome Summary  Outcome: Adequate for Discharge Date Met:  01/21/17  A&Ox4, but forgetful at times. Sleeps between cares. 1/5 strength x4 extremities, BUE numbness fingertips to elbows, BLE numbness toes to knees. VSS. Scheduled atenolol restarted d/t tachycardia. Tele afib with CVR, RVR at times. Regular diet. Occasional cough, productive at times. Pain to R shoulder decreased with Lidoderm patch, denies pain this afternoon. R IJ, 2 PIV's. Blanchable erythema to coccyx, protective mepilex in place. T/R q2h, 1st step mattress in use. Plan to d/c to [facility] via ambulance at 1530. Report given to Ed [facility]. Parisi, charge RN at [facility]. Pt to go to [facility], [address] [facility], Room 726. Family/pt updated on plan of care.

## 2017-04-18 ENCOUNTER — DOCUMENTATION ONLY (OUTPATIENT)
Dept: OTHER | Facility: CLINIC | Age: 72
End: 2017-04-18

## 2017-04-18 DIAGNOSIS — Z71.89 ACP (ADVANCE CARE PLANNING): Chronic | ICD-10-CM

## 2017-10-20 NOTE — PROGRESS NOTES
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  Significant findings:Palindrome 14.5 fr 19 cm catheter placed via right IJV access. Tip of catheter in upper right atrium.    See imaging dictation for procedural details.    Provider name: Jr Sam  Assistant(s):None       98